# Patient Record
Sex: FEMALE | Race: WHITE | Employment: OTHER | RURAL
[De-identification: names, ages, dates, MRNs, and addresses within clinical notes are randomized per-mention and may not be internally consistent; named-entity substitution may affect disease eponyms.]

---

## 2020-04-29 ENCOUNTER — TELEPHONE (OUTPATIENT)
Dept: CARDIOLOGY CLINIC | Age: 60
End: 2020-04-29

## 2020-04-29 PROBLEM — D64.9 ACUTE ON CHRONIC ANEMIA: Status: ACTIVE | Noted: 2020-04-29

## 2020-04-29 NOTE — TELEPHONE ENCOUNTER
Spoke w/ BELGICA Jin regarding this pts upcoming appt with Dr. Zion Olson on Friday (VV). Pts sx's are worsening since VV with Zackary Malik NP 4/28/20. Pt went  to [facility] today to have labs drawn. Pt had a presyncopal episode outside of the office and was assisted to the sitting position by BELGICA Jin. Annabel's note from today:  \"2423  Patient came to office to have her labs drawn per GABRIELLE Ventura's orders. Patient came to the door with her son and stated that she felt like she needed to sit down. Upon visualizing the patient, patient began to look as she was going to have a syncopal episode and pallor noted. Patient placed in sitting position on seat outside of door. Patient began to regain color and felt better once seated. Patient still fatigued and weak. Patient afebrile. Denies any other symptoms at this time. Notified EFRAÍN Augustine NP in office of situation and due to assessment of patient and situation, patient was then changed to an in-person visit with Inessa Rees in order to complete vitals and ekg and further assessment. Patient placed in room with Nurse Selena Moreira RN. Patient stable at this time. Tried to also contact Dr. Zion Olson office. Sent message to RIGOBERTO Medina LPN with Dr. Zion Olosn to return call to give update on patient as she is to be seen by him on Friday with a virtual visit. Manisha Knight LPN  From Dr. Zion Olson' office and she is aware of situation and vitals and EKG and states that she will discuss with Dr. Zion Olson tomorrow to confirm if he wants to still see her with VV or see in office. They will contact patient of any change. \"    EKG and labs have been drawn. Orthos were performed and documented by Carol Robert RN. \"Orthostatic BP readings sitting 120/68  p 62  flat 110/62 p 68and standing 110/60 p 60.\"    I will discuss with Dr. Zion Olson tomorrow.

## 2020-04-30 NOTE — TELEPHONE ENCOUNTER
Appt for 5/1 (cardiology) has been cancelled per Dr. Akanksha Taveras and James Lomeli, NP. Pt will be called by [facility].

## 2020-05-01 ENCOUNTER — PATIENT OUTREACH (OUTPATIENT)
Dept: FAMILY MEDICINE CLINIC | Age: 60
End: 2020-05-01

## 2020-05-01 NOTE — PROGRESS NOTES
Patient contacted regarding recent discharge and COVID-19 risk   Care Transition Nurse/ Ambulatory Care Manager contacted the patient by telephone to perform post discharge assessment. Verified name and  with patient as identifiers. Patient has following risk factors of:Chronic anemia . CTN/ACM reviewed discharge instructions, medical action plan and red flags related to discharge diagnosis. Reviewed and educated them on any new and changed medications related to discharge diagnosis. Advised obtaining a 90-day supply of all daily and as-needed medications. Education provided regarding infection prevention, and signs and symptoms of COVID-19 and when to seek medical attention with patient who verbalized understanding. Discussed exposure protocols and quarantine from [address] you at higher risk for severe illness  and given an opportunity for questions and concerns. The patient agrees to contact the COVID-19 hotline [phone] or PCP office for questions related to their healthcare. CTN/ACM provided contact information for future reference. From CDC: Are you at higher risk for severe illness?  Wash your hands often.  Avoid close contact (6 feet, which is about two arm lengths) with people who are sick.  Put distance between yourself and other people if COVID-19 is spreading in your community.  Clean and disinfect frequently touched surfaces.  Avoid all cruise travel and non-essential air travel.  Call your healthcare professional if you have concerns about COVID-19 and your underlying condition or if you are sick. For more information on steps you can take to protect yourself, see CDC's How to Protect Yourself      Patient/family/caregiver given information for [employer] and agrees to enroll yes  Patient's preferred e-mail:  [email or web address]  Patient's preferred phone number: [phone]  Based on Loop alert triggers, patient will be contacted by nurse care manager for worsening symptoms. Pt will be further monitored by COVID Loop Team based on severity of symptoms and risk factors.     PCP follow up completed 5/1/2020

## 2020-05-14 ENCOUNTER — OFFICE VISIT (OUTPATIENT)
Dept: ONCOLOGY | Age: 60
End: 2020-05-14

## 2020-05-14 VITALS
DIASTOLIC BLOOD PRESSURE: 82 MMHG | SYSTOLIC BLOOD PRESSURE: 131 MMHG | RESPIRATION RATE: 18 BRPM | WEIGHT: 167.8 LBS | OXYGEN SATURATION: 99 % | HEIGHT: 66 IN | BODY MASS INDEX: 26.97 KG/M2 | TEMPERATURE: 98.1 F | HEART RATE: 88 BPM

## 2020-05-14 DIAGNOSIS — D50.9 IRON DEFICIENCY ANEMIA, UNSPECIFIED IRON DEFICIENCY ANEMIA TYPE: Primary | ICD-10-CM

## 2020-05-14 RX ORDER — DIPHENHYDRAMINE HYDROCHLORIDE 50 MG/ML
25 INJECTION, SOLUTION INTRAMUSCULAR; INTRAVENOUS AS NEEDED
Status: CANCELLED
Start: 2020-05-22

## 2020-05-14 RX ORDER — DIPHENHYDRAMINE HYDROCHLORIDE 50 MG/ML
50 INJECTION, SOLUTION INTRAMUSCULAR; INTRAVENOUS AS NEEDED
Status: CANCELLED
Start: 2020-05-22

## 2020-05-14 RX ORDER — HEPARIN 100 UNIT/ML
300-500 SYRINGE INTRAVENOUS AS NEEDED
Status: CANCELLED
Start: 2020-05-22

## 2020-05-14 RX ORDER — DIPHENHYDRAMINE HYDROCHLORIDE 50 MG/ML
50 INJECTION, SOLUTION INTRAMUSCULAR; INTRAVENOUS AS NEEDED
Status: CANCELLED
Start: 2020-05-15

## 2020-05-14 RX ORDER — ALBUTEROL SULFATE 0.83 MG/ML
2.5 SOLUTION RESPIRATORY (INHALATION) AS NEEDED
Status: CANCELLED
Start: 2020-05-22

## 2020-05-14 RX ORDER — EPINEPHRINE 1 MG/ML
0.3 INJECTION, SOLUTION, CONCENTRATE INTRAVENOUS AS NEEDED
Status: CANCELLED | OUTPATIENT
Start: 2020-05-22

## 2020-05-14 RX ORDER — ALBUTEROL SULFATE 0.83 MG/ML
2.5 SOLUTION RESPIRATORY (INHALATION) AS NEEDED
Status: CANCELLED
Start: 2020-05-15

## 2020-05-14 RX ORDER — HEPARIN 100 UNIT/ML
300-500 SYRINGE INTRAVENOUS AS NEEDED
Status: CANCELLED
Start: 2020-05-15

## 2020-05-14 RX ORDER — SODIUM CHLORIDE 0.9 % (FLUSH) 0.9 %
10 SYRINGE (ML) INJECTION AS NEEDED
Status: CANCELLED
Start: 2020-05-22

## 2020-05-14 RX ORDER — ONDANSETRON 2 MG/ML
8 INJECTION INTRAMUSCULAR; INTRAVENOUS AS NEEDED
Status: CANCELLED | OUTPATIENT
Start: 2020-05-15

## 2020-05-14 RX ORDER — HYDROCORTISONE SODIUM SUCCINATE 100 MG/2ML
100 INJECTION, POWDER, FOR SOLUTION INTRAMUSCULAR; INTRAVENOUS AS NEEDED
Status: CANCELLED | OUTPATIENT
Start: 2020-05-22

## 2020-05-14 RX ORDER — SODIUM CHLORIDE 9 MG/ML
10 INJECTION INTRAMUSCULAR; INTRAVENOUS; SUBCUTANEOUS AS NEEDED
Status: CANCELLED | OUTPATIENT
Start: 2020-05-22

## 2020-05-14 RX ORDER — ACETAMINOPHEN 325 MG/1
650 TABLET ORAL AS NEEDED
Status: CANCELLED
Start: 2020-05-22

## 2020-05-14 RX ORDER — ONDANSETRON 2 MG/ML
8 INJECTION INTRAMUSCULAR; INTRAVENOUS AS NEEDED
Status: CANCELLED | OUTPATIENT
Start: 2020-05-22

## 2020-05-14 RX ORDER — EPINEPHRINE 1 MG/ML
0.3 INJECTION, SOLUTION, CONCENTRATE INTRAVENOUS AS NEEDED
Status: CANCELLED | OUTPATIENT
Start: 2020-05-15

## 2020-05-14 RX ORDER — HYDROCORTISONE SODIUM SUCCINATE 100 MG/2ML
100 INJECTION, POWDER, FOR SOLUTION INTRAMUSCULAR; INTRAVENOUS AS NEEDED
Status: CANCELLED | OUTPATIENT
Start: 2020-05-15

## 2020-05-14 RX ORDER — SODIUM CHLORIDE 9 MG/ML
10 INJECTION INTRAMUSCULAR; INTRAVENOUS; SUBCUTANEOUS AS NEEDED
Status: CANCELLED | OUTPATIENT
Start: 2020-05-15

## 2020-05-14 RX ORDER — SODIUM CHLORIDE 0.9 % (FLUSH) 0.9 %
10 SYRINGE (ML) INJECTION AS NEEDED
Status: CANCELLED
Start: 2020-05-15

## 2020-05-14 RX ORDER — ACETAMINOPHEN 325 MG/1
650 TABLET ORAL AS NEEDED
Status: CANCELLED
Start: 2020-05-15

## 2020-05-14 RX ORDER — DEXTROMETHORPHAN HYDROBROMIDE, GUAIFENESIN 5; 100 MG/5ML; MG/5ML
650 LIQUID ORAL
COMMUNITY
End: 2021-12-05

## 2020-05-14 RX ORDER — DIPHENHYDRAMINE HYDROCHLORIDE 50 MG/ML
25 INJECTION, SOLUTION INTRAMUSCULAR; INTRAVENOUS AS NEEDED
Status: CANCELLED
Start: 2020-05-15

## 2020-05-14 RX ORDER — ASCORBIC ACID 500 MG
1000 TABLET ORAL DAILY
COMMUNITY

## 2020-05-14 NOTE — PROGRESS NOTES
Merissa Buckley is a 62 y.o. female new patient today referred by GABRIELLE Ventura for Low hemoglobin and anemia. Patient first saw a Hematologist in 2017 for anemia. Patient c/o feeling tired, having SOB with exertions.      Patient received PRBC transfusion   Injectafer 5/8/2020 x 1, scheduled for #2 on 5/15/2020  1 unit PRBC 5/5/2020  2 units PRBC on 4/29/2020    Last;  Colonoscopy about 11 years ago

## 2020-05-14 NOTE — PROGRESS NOTES
Reason for Visit:   Jose Cornell is a 62 y.o. female who is seen for iron deficiency anemia    Treatment History:   Dx: Iron Deficiency anemia, transfused PRBCs 5/5/2020 (1 unit) and 5/9/2020 (2 units)  Tx: Injectofer    History of Present Illness:   Seen for above--originally dx a few years ago in [place]--no source found. Was treated with transfusion and parenteral iron. Moved to this area and noted lightheadedness and fatigue--check found microcytic anemia with very low iron. Has been transfused total 3Units PRBC's in the past 3 weeks. Has also received one dose of Injectofer. Fatigue is better but still present. No obvious signs of bleeding and is hemoccult negative. Craving ice. Past Medical History:   Diagnosis Date    Allergies     Anemia       No past surgical history on file. Social History     Tobacco Use    Smoking status: Never Smoker    Smokeless tobacco: Never Used   Substance Use Topics    Alcohol use: Yes     Alcohol/week: 1.0 standard drinks     Types: 1 Shots of liquor per week     Comment: ususally 1 tequile shot per month      Family History   Problem Relation Age of Onset    Alcohol abuse Father     Dementia Father     Alcohol abuse Brother     Heart Disease Mother         open heart surgery with valve replacement    Alcohol abuse Sister     Cancer Maternal Aunt         unknown    Cancer Maternal Uncle 79        prostate    Arthritis-rheumatoid Paternal Aunt     Cancer Paternal Uncle 79        prostate ?  Alcohol abuse Paternal Grandfather      Current Outpatient Medications   Medication Sig    acetaminophen (Tylenol Arthritis Pain) 650 mg TbER Take 650 mg by mouth every eight (8) hours as needed for Pain.  ascorbic acid, vitamin C, (Vitamin C) 500 mg tablet Take 1,000 mg by mouth daily.  ferric carboxymaltose (INJECTAFER IV) by IntraVENous route.  Indications: last infusion 5/8/2020    CYANOCOBALAMIN, VITAMIN B-12, 5,000 Units by Does Not Apply route daily.  lisdexamfetamine (Vyvanse) 50 mg cap Take 1 Cap by mouth daily. Max Daily Amount: 50 mg.    furosemide (Lasix) 20 mg tablet Take 20 mg by mouth daily as needed.  ferrous sulfate 325 mg (65 mg iron) tablet Take 1 Tab by mouth two (2) times a day.  Synthroid 75 mcg tablet Take 1 Tab by mouth Daily (before breakfast).  desvenlafaxine succinate (PRISTIQ) 50 mg ER tablet Take 50 mg by mouth. No current facility-administered medications for this visit. Allergies   Allergen Reactions    Macrobid [Nitrofurantoin Monohyd/M-Cryst] Palpitations    Weed Pollen-Short Ragweed Rash        Review of Systems: A complete review of systems was obtained, negative except as described above. Physical Exam:     Visit Vitals  /82   Pulse 88   Temp 98.1 °F (36.7 °C) (Oral)   Resp 18   Ht 5' 5.75\" (1.67 m)   Wt 167 lb 12.8 oz (76.1 kg)   SpO2 99%   BMI 27.29 kg/m²     ECOG PS:   General: No distress  Eyes: PERRLA, anicteric sclerae  HENT: Atraumatic, OP clear  Neck: Supple  Lymphatic: No cervical, supraclavicular, or inguinal adenopathy  Respiratory: CTAB, normal respiratory effort  CV: Normal rate, regular rhythm, no murmurs, no peripheral edema  GI: Soft, nontender, nondistended, no masses, no hepatomegaly, no splenomegaly  MS: Normal gait and station. Digits without clubbing or cyanosis. Skin: No rashes, ecchymoses, or petechiae. Normal temperature, turgor, and texture. Psych: Alert, oriented, appropriate affect, normal judgment/insight    Results:     Lab Results   Component Value Date/Time    WBC 5.2 05/05/2020 02:51 PM    HGB 7.4 (L) 05/05/2020 02:51 PM    HCT 26.8 (L) 05/05/2020 02:51 PM    PLATELET 049 92/29/5528 02:51 PM    MCV 75.7 (L) 05/05/2020 02:51 PM    ABS.  NEUTROPHILS 3.3 05/05/2020 02:51 PM    Hemoglobin (POC) 7.3 05/01/2020 01:25 PM     Lab Results   Component Value Date/Time    Sodium 137 04/29/2020 04:54 PM    Potassium 4.4 04/29/2020 04:54 PM    Chloride 102 04/29/2020 04:54 PM    CO2 26 04/29/2020 04:54 PM    Glucose 108 (H) 04/29/2020 04:54 PM    BUN 26 (H) 04/29/2020 04:54 PM    Creatinine 0.66 04/29/2020 04:54 PM    GFR est AA >60 04/29/2020 04:54 PM    GFR est non-AA >60 04/29/2020 04:54 PM    Calcium 9.5 04/29/2020 04:54 PM    Glucose  04/29/2020 03:01 PM     Lab Results   Component Value Date/Time    Bilirubin, total 0.2 04/29/2020 02:49 PM    ALT (SGPT) 13 04/29/2020 02:49 PM    AST (SGOT) 11 04/29/2020 02:49 PM    Alk. phosphatase 83 04/29/2020 02:49 PM    Protein, total 6.3 04/29/2020 02:49 PM    Albumin 4.6 04/29/2020 02:49 PM           Assessment:   1) Iron Deficiency Anemia        Plan:   1) Injectofer, referral for colonoscopy and possible upper. Back in one month.     Signed By: Kassie Jeffries MD

## 2020-05-28 ENCOUNTER — TELEPHONE (OUTPATIENT)
Dept: ONCOLOGY | Age: 60
End: 2020-05-28

## 2020-06-08 ENCOUNTER — OFFICE VISIT (OUTPATIENT)
Dept: SURGERY | Age: 60
End: 2020-06-08

## 2020-06-08 VITALS
SYSTOLIC BLOOD PRESSURE: 133 MMHG | BODY MASS INDEX: 25.88 KG/M2 | OXYGEN SATURATION: 100 % | TEMPERATURE: 95 F | RESPIRATION RATE: 14 BRPM | DIASTOLIC BLOOD PRESSURE: 85 MMHG | HEART RATE: 84 BPM | HEIGHT: 66 IN | WEIGHT: 161 LBS

## 2020-06-08 DIAGNOSIS — D64.9 ANEMIA, UNSPECIFIED TYPE: Primary | ICD-10-CM

## 2020-06-08 RX ORDER — OMEPRAZOLE 40 MG/1
40 CAPSULE, DELAYED RELEASE ORAL DAILY
Qty: 30 CAP | Refills: 2 | Status: SHIPPED | OUTPATIENT
Start: 2020-06-08 | End: 2020-08-31 | Stop reason: SDUPTHER

## 2020-06-08 NOTE — PROGRESS NOTES
1. Have you been to the ER, urgent care clinic since your last visit? Hospitalized since your last visit? No    2. Have you seen or consulted any other health care providers outside of the [address] [place] [address] since your last visit? Include any pap smears or colon screening.  No

## 2020-06-08 NOTE — PROGRESS NOTES
Rodriguez Weinstein is a 62 y.o. female who presents today with the following:  Chief Complaint   Patient presents with    Anemia       HPI    78-year-old female who presents to us as a referral by Dr. Mansoor Cerna for possible EGD and colonoscopy. The patient apparently had significant anemia and was initially evaluated by Rupa Erwin. When she was found to be severely anemic she was referred to the emergency department where she received a 2 unit transfusion. According to the patient she was encouraged to be admitted but she did not want to be admitted to the hospital because of the risk of COVID-19. This was in early May. She subsequently had an additional 1 unit transfusion later that month. She also received 2 iron infusions that were done in [place]. She denies having any abdominal pain. She denied any melena or hematochezia. She denied any nausea or vomiting. She states 5 years ago she was found to be anemic and had 2 iron infusions and her anemia resolved and did not have any endoscopic work-up at that point. She states that every year on her annual exams she has a stool test for blood and they have always been negative. She also was checked for a Hemoccult test when she was in the emergency department at the time of her 2 unit transfusion and apparently was heme-negative at that point as well. Her last colonoscopy was 11 years ago and she believes it was normal.  She states in the past she has had significant problems with reflux and dysphasia for which she has put herself on over-the-counter Prevacid but she has not done this again since August of last year. She states she was under a significant amount of stress through the winter she started to feel severely fatigued by January. She was getting short of breath and lightheaded and that is when she was evaluated in May by Rupa Erwin.   Since her iron infusions and blood transfusions her most recent hemoglobin has normalized to 11.7 and she is no longer microcytic. Interestingly enough back in November she was having some back pain and she admits to taking at least 4 Advil tablets at a time every 6 hours and this seems like it was going on for more than a week. She was not taking any GI protection at that point. She does not smoke and rarely drinks alcohol. Past Medical History:   Diagnosis Date    Allergies     Anemia        History reviewed. No pertinent surgical history. Social History     Socioeconomic History    Marital status: LEGALLY      Spouse name: Not on file    Number of children: 2    Years of education: 12    Highest education level: Not on file   Occupational History    Not on file   Social Needs    Financial resource strain: Not hard at all   Orbital Traction insecurity     Worry: Never true     Inability: Never true   ViFlux needs     Medical: No     Non-medical: No   Tobacco Use    Smoking status: Never Smoker    Smokeless tobacco: Never Used   Substance and Sexual Activity    Alcohol use:  Yes     Alcohol/week: 1.0 standard drinks     Types: 1 Shots of liquor per week     Comment: ususally 1 tequile shot per month    Drug use: Never    Sexual activity: Not on file   Lifestyle    Physical activity     Days per week: 0 days     Minutes per session: 0 min    Stress: Not at all   Relationships    Social connections     Talks on phone: More than three times a week     Gets together: More than three times a week     Attends [employer] service: Never     Active member of club or organization: No     Attends meetings of clubs or organizations: Not on file     Relationship status:     Intimate partner violence     Fear of current or ex partner: No     Emotionally abused: No     Physically abused: No     Forced sexual activity: No   Other Topics Concern     Service No    Blood Transfusions No    Caffeine Concern No    Occupational Exposure No    Hobby Hazards No    Sleep Concern No    Stress Concern No    Weight Concern No    Special Diet No    Back Care No    Exercise No    Bike Helmet No    Seat Belt Yes    Self-Exams Yes   Social History Narrative    Not on file       Family History   Problem Relation Age of Onset    Alcohol abuse Father     Dementia Father     Alcohol abuse Brother     Heart Disease Mother         open heart surgery with valve replacement    Alcohol abuse Sister     Cancer Maternal Aunt         unknown    Cancer Maternal Uncle 79        prostate    Arthritis-rheumatoid Paternal Aunt     Cancer Paternal Uncle 79        prostate ?  Alcohol abuse Paternal Grandfather        Allergies   Allergen Reactions    Macrobid [Nitrofurantoin Monohyd/M-Cryst] Palpitations    Weed Pollen-Short Ragweed Rash       Current Outpatient Medications   Medication Sig    omeprazole (PRILOSEC) 40 mg capsule Take 1 Cap by mouth daily for 90 days.  doxycycline (MONODOX) 100 mg capsule Take 1 Cap by mouth two (2) times a day for 10 days.  desvenlafaxine succinate (Pristiq) 25 mg ER tablet Take 1 Tab by mouth daily.  fluvoxaMINE (LUVOX) 50 mg tablet Take 1 Tab by mouth nightly.  acetaminophen (Tylenol Arthritis Pain) 650 mg TbER Take 650 mg by mouth every eight (8) hours as needed for Pain.  ascorbic acid, vitamin C, (Vitamin C) 500 mg tablet Take 1,000 mg by mouth daily.  ferric carboxymaltose (INJECTAFER IV) by IntraVENous route. Not current medication  Indications: last infusion 5/8/2020    CYANOCOBALAMIN, VITAMIN B-12, 5,000 Units by Does Not Apply route daily.  lisdexamfetamine (Vyvanse) 50 mg cap Take 1 Cap by mouth daily. Max Daily Amount: 50 mg.    furosemide (Lasix) 20 mg tablet Take 20 mg by mouth daily as needed. Not a current medication    ferrous sulfate 325 mg (65 mg iron) tablet Take 1 Tab by mouth two (2) times a day.  Synthroid 75 mcg tablet Take 1 Tab by mouth Daily (before breakfast).      No current facility-administered medications for this visit. The above histories, medications and allergies have been reviewed. Review of Systems   Constitutional: Positive for malaise/fatigue. HENT: Positive for congestion. Musculoskeletal: Positive for back pain, myalgias and neck pain. Psychiatric/Behavioral: Positive for depression. Visit Vitals  /85 (BP 1 Location: Left arm, BP Patient Position: Sitting)   Pulse 84   Temp (!) 95 °F (35 °C) (Temporal)   Resp 14   Ht 5' 5.75\" (1.67 m)   Wt 161 lb (73 kg)   SpO2 100%   BMI 26.18 kg/m²     Physical Exam  Constitutional:       Appearance: Normal appearance. Cardiovascular:      Rate and Rhythm: Normal rate and regular rhythm. Pulmonary:      Effort: Pulmonary effort is normal.      Breath sounds: Normal breath sounds. Abdominal:      General: There is no distension. Palpations: Abdomen is soft. There is no mass. Tenderness: There is no abdominal tenderness. Neurological:      Mental Status: She is alert. 1. Anemia, unspecified type  I have recommended EGD and colonoscopy for this patient. In particular with her history of NSAID use back in November and the subsequent anemia I would not be surprised if she had an ulcer. It is also been at least 10 to 11 years since her last colonoscopy. She is very concerned about the possibility of priscila COVID-19. She does not desire any further procedures at the hospital.  We had an extensive discussion stating that I could not tell her what the potential cause was without further investigation. I have explained that there is also the possibility that this may be more than peptic ulcer disease and there is a possibility of a cancer. She understands and still wishes to hold off on endoscopies at this point stating that she would reconsider. Based on this I will go ahead and treat her with omeprazole daily for the next 6 weeks. If she changes her mind we can plan on proceeding with endoscopies.   I am cautiously optimistic seeing that her hemoglobin has normalized as has her MCV however I would feel better if we had done endoscopy so that we could make sure there was nothing more ominous present. Follow-up and Dispositions    · Return if symptoms worsen or fail to improve.          Lana Duran MD

## 2020-06-08 NOTE — PATIENT INSTRUCTIONS
Anemia: Care Instructions Your Care Instructions Anemia is a low level of red blood cells, which carry oxygen throughout your body. Many things can cause anemia. Lack of iron is one of the most common causes. Your body needs iron to make hemoglobin, a substance in red blood cells that carries oxygen from the lungs to your body's cells. Without enough iron, the body produces fewer and smaller red blood cells. As a result, your body's cells do not get enough oxygen, and you feel tired and weak. And you may have trouble concentrating. Bleeding is the most common cause of a lack of iron. You may have heavy menstrual bleeding or bleeding caused by conditions such as ulcers, hemorrhoids, or cancer. Regular use of aspirin or other anti-inflammatory medicines (such as ibuprofen) also can cause bleeding in some people. A lack of iron in your diet also can cause anemia, especially at times when the body needs more iron, such as during pregnancy, infancy, and the teen years. Your doctor may have prescribed iron pills. It may take several months of treatment for your iron levels to return to normal. Your doctor also may suggest that you eat foods that are rich in iron, such as meat and beans. There are many other causes of anemia. It is not always due to a lack of iron. Finding the specific cause of your anemia will help your doctor find the right treatment for you. Follow-up care is a key part of your treatment and safety. Be sure to make and go to all appointments, and call your doctor if you are having problems. It's also a good idea to know your test results and keep a list of the medicines you take. How can you care for yourself at home? · Take your medicines exactly as prescribed. Call your doctor if you think you are having a problem with your medicine. · If your doctor recommends iron pills, take them as directed: ? Try to take the pills on an empty stomach about 1 hour before or 2 hours after meals. But you may need to take iron with food to avoid an upset stomach. ? Do not take antacids or drink milk or caffeine drinks (such as coffee, tea, or cola) at the same time or within 2 hours of the time that you take your iron. They can make it hard for your body to absorb the iron. ? Vitamin C (from food or supplements) helps your body absorb iron. Try taking iron pills with a glass of orange juice or some other food that is high in vitamin C, such as citrus fruits. ? Iron pills may cause stomach problems, such as heartburn, nausea, diarrhea, constipation, and cramps. Be sure to drink plenty of fluids, and include fruits, vegetables, and fiber in your diet each day. Iron pills often make your bowel movements dark or green. ? If you forget to take an iron pill, do not take a double dose of iron the next time you take a pill. ? Keep iron pills out of the reach of small children. An overdose of iron can be very dangerous. · Follow your doctor's advice about eating iron-rich foods. These include red meat, shellfish, poultry, eggs, beans, raisins, whole-grain bread, and leafy green vegetables. · Steam vegetables to help them keep their iron content. When should you call for help? [id number] anytime you think you may need emergency care. For example, call if: 
· You have symptoms of a heart attack. These may include: 
? Chest pain or pressure, or a strange feeling in the chest. 
? Sweating. ? Shortness of breath. ? Nausea or vomiting. ? Pain, pressure, or a strange feeling in the back, neck, jaw, or upper belly or in one or both shoulders or arms. ? Lightheadedness or sudden weakness. ? A fast or irregular heartbeat. After you call 911, the  may tell you to chew 1 adult-strength or 2 to 4 low-dose aspirin. Wait for an ambulance. Do not try to drive yourself. · You passed out (lost consciousness). Call your doctor now or seek immediate medical care if: · You have new or increased shortness of breath. · You are dizzy or lightheaded, or you feel like you may faint. · Your fatigue and weakness continue or get worse. · You have any abnormal bleeding, such as: 
? Nosebleeds. ? Vaginal bleeding that is different (heavier, more frequent, at a different time of the month) than what you are used to. 
? Bloody or black stools, or rectal bleeding. ? Bloody or pink urine. Watch closely for changes in your health, and be sure to contact your doctor if: 
· You do not get better as expected. Where can you learn more? Go to http://nishi-jose.info/ Enter R301 in the search box to learn more about \"Anemia: Care Instructions. \" Current as of: November 8, 2019               Content Version: 12.5 © 0763-8969 [employer]. Care instructions adapted under license by [employer] (which disclaims liability or warranty for this information). If you have questions about a medical condition or this instruction, always ask your healthcare professional. [address] any warranty or liability for your use of this information.

## 2020-07-10 ENCOUNTER — TELEPHONE (OUTPATIENT)
Dept: ONCOLOGY | Age: 60
End: 2020-07-10

## 2020-07-10 NOTE — TELEPHONE ENCOUNTER
Patient called & canceled apt scheduled for today.  Patient has a stomach virus, apt r/s  on 07/16/2020

## 2020-07-20 ENCOUNTER — OFFICE VISIT (OUTPATIENT)
Dept: SURGERY | Age: 60
End: 2020-07-20

## 2020-07-20 VITALS
HEIGHT: 66 IN | BODY MASS INDEX: 25.88 KG/M2 | WEIGHT: 161 LBS | DIASTOLIC BLOOD PRESSURE: 91 MMHG | TEMPERATURE: 97.3 F | SYSTOLIC BLOOD PRESSURE: 141 MMHG | HEART RATE: 89 BPM

## 2020-07-20 DIAGNOSIS — D64.9 ANEMIA, UNSPECIFIED TYPE: Primary | ICD-10-CM

## 2020-07-20 NOTE — PATIENT INSTRUCTIONS
Learning About Colonoscopy  What is a colonoscopy? A colonoscopy is a test (also called a procedure) that lets a doctor look inside your large intestine. The doctor uses a thin, lighted tube called a colonoscope. The doctor uses it to look for small growths called polyps, colon or rectal cancer (colorectal cancer), or other problems like bleeding. During the procedure, the doctor can take samples of tissue. The samples can then be checked for cancer or other conditions. The doctor can also take out polyps. How is a colonoscopy done? This procedure is done in a doctor's office or a clinic or hospital. You will get medicine to help you relax and not feel pain. Some people find that they don't remember having the test because of the medicine. The doctor gently moves the colonoscope, or scope, through the colon. The scope is also a small video camera. It lets the doctor see the colon and take pictures. How do you prepare for the procedure? You need to clean out your colon before the procedure so the doctor can see all of your colon. This process may start a day or two before the test. This depends on which \"colon prep\" your doctor recommends. To clean your colon, you stop eating solid foods and drink only clear liquids. You can have water, tea, coffee, clear juices, clear broths, flavored ice pops, and gelatin (such as Jell-O). Do not drink anything red or purple. The day or night before the procedure, you drink a large amount of a special liquid. This causes loose, frequent stools. You will go to the bathroom a lot. It's very important to drink all of the liquid. If you have problems drinking it, call your doctor. Some people don't go to work or do their usual activities on the day of the prep. Arrange to have someone take you home after the test.  What can you expect after a colonoscopy? Your doctor will tell you when you can eat and do your usual activities.   Drink a lot of fluid after the test to replace the fluids you may have lost during the colon prep. But don't drink alcohol. Your doctor will talk to you about when you'll need your next colonoscopy. The results of your test and your risk for colorectal cancer will help your doctor decide how often you need to be checked. After the test, you may be bloated or have gas pains. You may need to pass gas. If a biopsy was done or a polyp was removed, you may have streaks of blood in your stool (feces) for a few days. If polyps were taken out, your doctor may tell you to avoid taking aspirin and nonsteroidal anti-inflammatory drugs (NSAIDs) for 7 to 14 days. Problems such as heavy rectal bleeding may not occur until several weeks after the test. This isn't common. But it can happen after polyps are removed. Follow-up care is a key part of your treatment and safety. Be sure to make and go to all appointments, and call your doctor if you are having problems. It's also a good idea to know your test results and keep a list of the medicines you take. Where can you learn more? Go to http://nishi-jose.info/  Enter Z368 in the search box to learn more about \"Learning About Colonoscopy. \"  Current as of: August 22, 2019               Content Version: 12.5  © 7726-6812 [employer]. Care instructions adapted under license by [employer] (which disclaims liability or warranty for this information). If you have questions about a medical condition or this instruction, always ask your healthcare professional. [place] [address] any warranty or liability for your use of this information. Upper GI Endoscopy: Before Your Procedure  What is an upper GI endoscopy? An upper gastrointestinal (or GI) endoscopy is a test that allows your doctor to look at the inside of your esophagus, stomach, and the first part of your small intestine, called the duodenum.  The esophagus is the tube that carries food to your stomach. The doctor uses a thin, lighted tube that bends. It is called an endoscope, or scope. The doctor puts the tip of the scope in your mouth and gently moves it down your throat. The scope is a flexible video camera. The doctor looks at a monitor (like a TV set or a computer screen) as he or she moves the scope. A doctor may do this procedure to look for ulcers, tumors, infection, or bleeding. It also can be used to look for signs of acid backing up into your esophagus. This is called gastroesophageal reflux disease, or GERD. The doctor can use the scope to take a sample of tissue for study (a biopsy). The doctor also can use the scope to take out growths or stop bleeding. Follow-up care is a key part of your treatment and safety. Be sure to make and go to all appointments, and call your doctor if you are having problems. It's also a good idea to know your test results and keep a list of the medicines you take. How do you prepare for the procedure? Procedures can be stressful. This information will help you understand what you can expect. And it will help you safely prepare for your procedure. Preparing for the procedure  · Do not eat or drink anything for 6 to 8 hours before the test. An empty stomach helps your doctor see your stomach clearly during the test. It also reduces your chances of vomiting. If you vomit, there is a small risk that the vomit could enter your lungs. (This is called aspiration.) If the test is done in an emergency, a tube may be inserted through your nose or mouth to empty your stomach. · Do not take sucralfate (Carafate) or antacids on the day of the test. These medicines can make it hard for your doctor to see your upper GI tract. · If your doctor tells you to, stop taking iron supplements 7 to 14 days before the test.  · Be sure you have someone to take you home. Anesthesia and pain medicine will make it unsafe for you to drive or get home on your own.   · Understand exactly what procedure is planned, along with the risks, benefits, and other options. · Tell your doctor ALL the medicines, vitamins, supplements, and herbal remedies you take. Some may increase the risk of problems during your procedure. Your doctor will tell you if you should stop taking any of them before the procedure and how soon to do it. · If you take aspirin or some other blood thinner, ask your doctor if you should stop taking it before your procedure. Make sure that you understand exactly what your doctor wants you to do. These medicines increase the risk of bleeding. · Make sure your doctor and the hospital have a copy of your advance directive. If you don't have one, you may want to prepare one. It lets others know your health care wishes. It's a good thing to have before any type of surgery or procedure. What happens on the day of the procedure? · Follow the instructions exactly about when to stop eating and drinking. If you don't, your procedure may be canceled. If your doctor told you to take your medicines on the day of the procedure, take them with only a sip of water. · Take a bath or shower before you come in for your procedure. Do not apply lotions, perfumes, deodorants, or nail polish. · Take off all jewelry and piercings. And take out contact lenses, if you wear them. At the hospital or surgery center    · Bring a picture ID. · The test may take 15 to 30 minutes. · The doctor may spray medicine on the back of your throat to numb it. You also will get medicine to prevent pain and to relax you. · You will lie on your left side. The doctor will put the scope in your mouth and toward the back of your throat. The doctor will tell you when to swallow. This helps the scope move down your throat. You will be able to breathe normally. The doctor will move the scope down your esophagus into your stomach. The doctor also may look at the duodenum.   · If your doctor wants to take a sample of tissue for a biopsy, he or she may use small surgical tools, which are put into the scope, to cut off some tissue. You will not feel a biopsy, if one is taken. The doctor also can use the tools to stop bleeding or to do other treatments, if needed. · You will stay at the hospital or surgery center for 1 to 2 hours until the medicine you were given wears off. What happens after an upper GI endoscopy? · After the test, you may belch and feel bloated for a while. · You may have a tickling, dry throat or mouth. You may feel a bit hoarse, and you may have a mild sore throat. These symptoms may last several days. Throat lozenges and warm saltwater gargles can help relieve the throat symptoms. · Don't drive or operate machinery for 12 hours after the test.  · Your doctor will tell you when you can go back to your usual diet and activities. · Don't drink alcohol for 12 to 24 hours after the test.  When should you call your doctor? · You have questions or concerns. · You don't understand how to prepare for your procedure. · You become ill before the procedure (such as fever, flu, or a cold). · You need to reschedule or have changed your mind about having the procedure. Where can you learn more? Go to http://nishi-jose.info/  Enter P790 in the search box to learn more about \"Upper GI Endoscopy: Before Your Procedure. \"  Current as of: August 12, 2019               Content Version: 12.5  © 9561-7596 [employer]. Care instructions adapted under license by [employer] (which disclaims liability or warranty for this information). If you have questions about a medical condition or this instruction, always ask your healthcare professional. [place] [address] any warranty or liability for your use of this information.

## 2020-07-20 NOTE — PROGRESS NOTES
Clarence Lauren is a 62 y.o. female who presents today with the following:  Chief Complaint   Patient presents with    Anemia       HPI    55-year-old female who presents to us as a referral by Dr. Kalli Burdick for possible EGD and colonoscopy. The patient apparently had significant anemia and was initially evaluated by Ji Santizo. When she was found to be severely anemic she was referred to the emergency department where she received a 2 unit transfusion. According to the patient she was encouraged to be admitted but she did not want to be admitted to the hospital because of the risk of COVID-19. This was in early May. She subsequently had an additional 1 unit transfusion later that month. She also received 2 iron infusions that were done in [place]. She denies having any abdominal pain. She denied any melena or hematochezia. She denied any nausea or vomiting. She states 5 years ago she was found to be anemic and had 2 iron infusions and her anemia resolved and did not have any endoscopic work-up at that point.     She states that every year on her annual exams she has a stool test for blood and they have always been negative. She also was checked for a Hemoccult test when she was in the emergency department at the time of her 2 unit transfusion and apparently was heme-negative at that point as well. Her last colonoscopy was 11 years ago and she believes it was normal.  She states in the past she has had significant problems with reflux and dysphasia for which she has put herself on over-the-counter Prevacid but she has not done this again since August of last year. She states she was under a significant amount of stress through the winter she started to feel severely fatigued by January. She was getting short of breath and lightheaded and that is when she was evaluated in May by Ji Santizo.   Since her iron infusions and blood transfusions her most recent hemoglobin has normalized to 11.7 and she is no longer microcytic.     Interestingly enough back in November she was having some back pain and she admits to taking at least 4 Advil tablets at a time every 6 hours and this seems like it was going on for more than a week. She was not taking any GI protection at that point. She does not smoke and rarely drinks alcohol. We sent her out at that point to take a PPI for 6 weeks which she has been doing more or less although she has forgotten a few doses. Today she states that she is feeling much better and her energy level has normalized but she was considering proceeding now with EGD and colonoscopy. Past Medical History:   Diagnosis Date    Allergies     Anemia        History reviewed. No pertinent surgical history. Social History     Socioeconomic History    Marital status: LEGALLY      Spouse name: Not on file    Number of children: 2    Years of education: 12    Highest education level: Not on file   Occupational History    Not on file   Social Needs    Financial resource strain: Not hard at all   [employer] insecurity     Worry: Never true     Inability: Never true   [employer] needs     Medical: No     Non-medical: No   Tobacco Use    Smoking status: Never Smoker    Smokeless tobacco: Never Used   Substance and Sexual Activity    Alcohol use:  Yes     Alcohol/week: 1.0 standard drinks     Types: 1 Shots of liquor per week     Comment: ususally 1 tequile shot per month    Drug use: Never    Sexual activity: Not on file   Lifestyle    Physical activity     Days per week: 0 days     Minutes per session: 0 min    Stress: Not at all   Relationships    Social connections     Talks on phone: More than three times a week     Gets together: More than three times a week     Attends [employer] service: Never     Active member of club or organization: No     Attends meetings of clubs or organizations: Not on file     Relationship status:     Intimate partner violence     Fear of current or ex partner: No     Emotionally abused: No     Physically abused: No     Forced sexual activity: No   Other Topics Concern     Service No    Blood Transfusions No    Caffeine Concern No    Occupational Exposure No    Hobby Hazards No    Sleep Concern No    Stress Concern No    Weight Concern No    Special Diet No    Back Care No    Exercise No    Bike Helmet No    Seat Belt Yes    Self-Exams Yes   Social History Narrative    Not on file       Family History   Problem Relation Age of Onset    Alcohol abuse Father     Dementia Father     Alcohol abuse Brother     Heart Disease Mother         open heart surgery with valve replacement    Alcohol abuse Sister     Cancer Maternal Aunt         unknown    Cancer Maternal Uncle 79        prostate    Arthritis-rheumatoid Paternal Aunt     Cancer Paternal Uncle 79        prostate ?  Alcohol abuse Paternal Grandfather        Allergies   Allergen Reactions    Macrobid [Nitrofurantoin Monohyd/M-Cryst] Palpitations    Weed Pollen-Short Ragweed Rash       Current Outpatient Medications   Medication Sig    lisdexamfetamine (Vyvanse) 50 mg cap Take 1 Cap by mouth daily. Max Daily Amount: 50 mg.    omeprazole (PRILOSEC) 40 mg capsule Take 1 Cap by mouth daily for 90 days.  desvenlafaxine succinate (Pristiq) 25 mg ER tablet Take 1 Tab by mouth daily.  fluvoxaMINE (LUVOX) 50 mg tablet Take 1 Tab by mouth nightly.  acetaminophen (Tylenol Arthritis Pain) 650 mg TbER Take 650 mg by mouth every eight (8) hours as needed for Pain.  ascorbic acid, vitamin C, (Vitamin C) 500 mg tablet Take 1,000 mg by mouth daily.  ferric carboxymaltose (INJECTAFER IV) by IntraVENous route. Not current medication  Indications: last infusion 5/8/2020    CYANOCOBALAMIN, VITAMIN B-12, 5,000 Units by Does Not Apply route daily.  furosemide (Lasix) 20 mg tablet Take 20 mg by mouth daily as needed.  Not a current medication    ferrous sulfate 325 mg (65 mg iron) tablet Take 1 Tab by mouth two (2) times a day.  Synthroid 75 mcg tablet Take 1 Tab by mouth Daily (before breakfast). No current facility-administered medications for this visit. The above histories, medications and allergies have been reviewed. ROS    Visit Vitals  BP (!) 141/91 (BP 1 Location: Left arm, BP Patient Position: Sitting)   Pulse 89   Temp 97.3 °F (36.3 °C) (Temporal)   Ht 5' 5.75\" (1.67 m)   Wt 161 lb (73 kg)   BMI 26.18 kg/m²     Physical Exam  Constitutional:       Appearance: Normal appearance. Cardiovascular:      Pulses: Normal pulses. Pulmonary:      Effort: Pulmonary effort is normal.      Breath sounds: Normal breath sounds. Abdominal:      General: There is no distension. Palpations: Abdomen is soft. There is no mass. Tenderness: There is no abdominal tenderness. Neurological:      Mental Status: She is alert. 1. Anemia, unspecified type  She would like to have a CBC which I think is reasonable to see where her anemia is at. She plans on proceeding with the EGD and colonoscopy but is somewhat hesitant in light of COVID-19. She states she will finalize her decision after her hemoglobin level has been determined. - CBC W/O DIFF    Recommend colonoscopy. The procedure was explained in detail including the risks and benefits. Risks shared included risks of missed lesions, incomplete exam, colon injury or perforation. Risks associated with anesthesia were also discussed. The patient wishes to proceed and we will schedule. Recommend EGD. Risks of the procedure were shared with the patient including the risks of aspiration or esophageal or gastric perforation. All questions were answered to the patient's satisfaction. We will schedule. The patient was counseled at length about the risks of priscila Covid-19 during their perioperative period and any recovery window from their procedure.   The patient was made aware that priscila Covid-19  may worsen their prognosis for recovering from their procedure and lend to a higher morbidity and/or mortality risk. All material risks, benefits, and reasonable alternatives including postponing the procedure were discussed. The patient does  wish to proceed with the procedure at this time. Follow-up and Dispositions    · Return for post procedure.          Heber Rojas MD

## 2020-10-09 DIAGNOSIS — F90.9 ATTENTION DEFICIT HYPERACTIVITY DISORDER (ADHD), UNSPECIFIED ADHD TYPE: ICD-10-CM

## 2020-12-29 ENCOUNTER — DOCUMENTATION ONLY (OUTPATIENT)
Dept: FAMILY MEDICINE CLINIC | Age: 60
End: 2020-12-29

## 2020-12-29 NOTE — PROGRESS NOTES
Recvd approval for PA, Case: [id number], fazed PA Approval to [facility] at [phone]. Recvd confirmation of fax. KT    Recvd fax for PA, covermymeds from [facility].     Started PA.    KEY: [id number]      They requested to know if pt has tried other oral antifungals. Pt has documented trial of Terbinafine from OV dated 2/21/2020. OV sent to [facility]. Awaiting decision.  MILAGROS

## 2021-03-31 DIAGNOSIS — F90.9 ATTENTION DEFICIT HYPERACTIVITY DISORDER (ADHD), UNSPECIFIED ADHD TYPE: ICD-10-CM

## 2021-03-31 NOTE — TELEPHONE ENCOUNTER
Requested Prescriptions     Pending Prescriptions Disp Refills    lisdexamfetamine (Vyvanse) 50 mg cap 30 Cap 0     Sig: Take 1 Cap by mouth daily. Max Daily Amount: 50 mg.  Indications: attention deficit disorder with hyperactivity

## 2021-04-15 ENCOUNTER — TELEPHONE (OUTPATIENT)
Dept: FAMILY MEDICINE CLINIC | Age: 61
End: 2021-04-15

## 2021-04-15 NOTE — TELEPHONE ENCOUNTER
----- Message from Ashley Guerin sent at 4/15/2021  1:37 PM EDT -----  Regarding: Nurse Ventura/Telephone    General Message/Vendor Calls    Caller's first and last name: Self      Reason for call: Pt would like to know if she can have antibodies test done. Callback required yes/no and why: Yes to advise if this test can be done. Best contact number(s): [phone]      Details to clarify the request:  Dr. Lucy Woods would like patient to have blood test for pneumonia antibodies before she gets second dose. Pt does not have an order for this testing.        Ashley Guerin

## 2021-04-15 NOTE — TELEPHONE ENCOUNTER
I have no idea what pneumonia antibodies she is referring to. She needs to get her other physician to send us an order of what exactly he wants.

## 2021-04-16 ENCOUNTER — TELEPHONE (OUTPATIENT)
Dept: FAMILY MEDICINE CLINIC | Age: 61
End: 2021-04-16

## 2021-04-16 NOTE — TELEPHONE ENCOUNTER
Patient is pleading that Stacy Harding complete the [employer] form for her medication. She says she can't focus without it and has little to no insurance to pay for medication.

## 2021-04-16 NOTE — TELEPHONE ENCOUNTER
Yes advised her that she should have pills remaining she only take 1 a day and was given 30 tablets. She stated that she has been traveling a lot from place to place and may have pills at home she will look, I have advised her that she needs to call pharmacy if she does not have remaining pills. Advised her I would complete form as much as I could but [employer] will need to sign off on it when she returns next week .

## 2021-04-28 ENCOUNTER — TELEPHONE (OUTPATIENT)
Dept: FAMILY MEDICINE CLINIC | Age: 61
End: 2021-04-28

## 2021-04-28 ENCOUNTER — OFFICE VISIT (OUTPATIENT)
Dept: FAMILY MEDICINE CLINIC | Age: 61
End: 2021-04-28
Payer: MEDICAID

## 2021-04-28 VITALS
RESPIRATION RATE: 16 BRPM | SYSTOLIC BLOOD PRESSURE: 138 MMHG | BODY MASS INDEX: 27.4 KG/M2 | OXYGEN SATURATION: 97 % | DIASTOLIC BLOOD PRESSURE: 82 MMHG | WEIGHT: 174.6 LBS | HEART RATE: 90 BPM | HEIGHT: 67 IN | TEMPERATURE: 97.8 F

## 2021-04-28 DIAGNOSIS — R63.5 WEIGHT GAIN: ICD-10-CM

## 2021-04-28 DIAGNOSIS — F33.2 MDD (MAJOR DEPRESSIVE DISORDER), RECURRENT SEVERE, WITHOUT PSYCHOSIS (HCC): ICD-10-CM

## 2021-04-28 DIAGNOSIS — Z23 ENCOUNTER FOR IMMUNIZATION: Primary | ICD-10-CM

## 2021-04-28 DIAGNOSIS — B35.0 FUNGAL SCALP INFECTION: ICD-10-CM

## 2021-04-28 DIAGNOSIS — R22.9 SKIN NODULE: ICD-10-CM

## 2021-04-28 PROCEDURE — 99213 OFFICE O/P EST LOW 20 MIN: CPT | Performed by: INTERNAL MEDICINE

## 2021-04-28 RX ORDER — SULFAMETHOXAZOLE AND TRIMETHOPRIM 800; 160 MG/1; MG/1
1 TABLET ORAL 2 TIMES DAILY
Qty: 14 TAB | Refills: 0 | Status: SHIPPED | OUTPATIENT
Start: 2021-04-28 | End: 2021-05-05

## 2021-04-28 RX ORDER — MUPIROCIN 20 MG/G
OINTMENT TOPICAL DAILY
Qty: 22 G | Refills: 0 | Status: SHIPPED | OUTPATIENT
Start: 2021-04-28 | End: 2021-06-07 | Stop reason: ALTCHOICE

## 2021-04-28 NOTE — PROGRESS NOTES
Pt presents to clinic today for removal of a tick. She reports that she was sitting on the ground yesterday and noticed it last night.

## 2021-04-28 NOTE — TELEPHONE ENCOUNTER
----- Message from Alissa Sierra sent at 4/28/2021 12:36 PM EDT -----  Regarding: Dr. Kristen Roach  Level 1/Escalated Issue      Caller's first and last name and relationship (if not the patient):      Best contact number(s): [phone]      What are the symptoms: pt has a tick embedded in her private area      Transfer successful - yes/no (include outcome): no, answer      Transfer declined - yes/no (include reason):       Was caller advised to seek appropriate level of care - yes/no: no      Details to clarify the request:  would like appointment today         Alissa Sierra

## 2021-04-28 NOTE — PROGRESS NOTES
1. Have you been to the ER, urgent care clinic since your last visit? Hospitalized since your last visit? No    2. Have you seen or consulted any other health care providers outside of the [address] [place] [address] since your last visit? Include any pap smears or colon screening.  No

## 2021-04-28 NOTE — TELEPHONE ENCOUNTER
Called pt, had to leave vm. Trying to reach her about coming on it to get tick removed. Also sent [address].  KT

## 2021-04-29 NOTE — PROGRESS NOTES
Myrna Palma is a 56 y.o. female who presents to the office today with the following:  Chief Complaint   Patient presents with    Tick Removal   Patient presents today with chief concern of having a tick in bedded in her vulvar area on the left. She had a tick in bedded there previously and became concerned when she had felt yesterday in the shower a hard lump that felt like it was under her skin in that area. No itching, discharge of which she is aware. The area is nontender. She has tried soaking in warm water and it has not dissipated. Still has many stressors going on in her life; divorce has not yet been finalized and she does have stressors with her two boys. Despite the stressors, she has noted that her shingles has stayed at [place] [address] and not recurred on the prophylactic of Valacyclovir. As well, the lesions that were in her scalp have healed up. She does have dry skin and itchiness the back of the scalp but she thinks maybe psoriasis. She usually treats us with certain shampoos    She also does have complaint of weight gain and not being able to lose the weight. She wonders if this may be secondary to her medications for her moods. She does not endorse any change in diet, but does state that she knows she needs to get more exercise and plans on ordering and a bike to ride. Allergies   Allergen Reactions    Macrobid [Nitrofurantoin Monohyd/M-Cryst] Palpitations    Meloxicam Other (comments)    Weed Pollen-Short Ragweed Rash       Current Outpatient Medications   Medication Sig    mupirocin (BACTROBAN) 2 % ointment Apply  to affected area daily.  valACYclovir (VALTREX) 500 mg tablet TAKE 1 TAB BY MOUTH TWO (2) TIMES A DAY. INDICATIONS: PREVENTION OF SHINGLES RECURRENCE    lisdexamfetamine (Vyvanse) 50 mg cap Take 1 Cap by mouth daily. Max Daily Amount: 50 mg.  Indications: attention deficit disorder with hyperactivity    ergocalciferol (ERGOCALCIFEROL) 1,250 mcg (50,000 unit) capsule Take 1 Cap by mouth every twenty-eight (28) days for 12 doses.  ferrous sulfate (IRON) 325 mg (65 mg iron) EC tablet TAKE 1 TABLET BY MOUTH TWICE A DAY    Synthroid 75 mcg tablet TAKE 1 TABLET BY MOUTH EVERY DAY BEFORE BREAKFAST    fluvoxaMINE (LUVOX) 50 mg tablet Take 1 Tab by mouth two (2) times a day.  omeprazole (PRILOSEC) 40 mg capsule TAKE 1 CAPSULE BY MOUTH EVERY DAY    amitriptyline (ELAVIL) 25 mg tablet Take 1 Tab by mouth nightly.  acyclovir (ZOVIRAX) 5 % topical cream Apply  to affected area five (5) times daily.  lidocaine 4 % spra 5-10 Sprays by Apply Externally route three (3) times daily as needed for Pain (itching on scalp). Indications: itching, skin irritation    gabapentin (NEURONTIN) 300 mg capsule Take 1 Cap by mouth three (3) times daily. Max Daily Amount: 900 mg. 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  acetaminophen (TYLENOL) 500 mg tablet Take 2 Tabs by mouth every eight (8) hours.  fluticasone propionate (Flonase Allergy Relief) 50 mcg/actuation nasal spray 2 Sprays by Both Nostrils route daily.  pseudoephedrine (Sudafed) 30 mg tablet Take  by mouth every four (4) hours as needed for Congestion.  guaiFENesin (MUCINEX) 1,200 mg Ta12 ER tablet Take 1 Tab by mouth two (2) times a day.  fluocinoNIDE (LIDEX) 0.05 % external solution APPLY TO SCALP TWICE A DAY    clindamycin (CLEOCIN T) 1 % lotion Apply  to affected area two (2) times a day. use thin film on affected area    acetaminophen (Tylenol Arthritis Pain) 650 mg TbER Take 650 mg by mouth every eight (8) hours as needed for Pain.  ascorbic acid, vitamin C, (Vitamin C) 500 mg tablet Take 1,000 mg by mouth daily.     CYANOCOBALAMIN, VITAMIN B-12, 5,000 Units by Does Not Apply route daily.  griseofulvin (GRIFULVIN V) 500 mg tab tablet TAKE 1 TAB BY MOUTH DAILY FOR 42 DAYS. No current facility-administered medications for this visit. Past Medical History:   Diagnosis Date    Allergies     Anemia        No past surgical history on file. Social History     Socioeconomic History    Marital status: LEGALLY      Spouse name: Not on file    Number of children: 2    Years of education: 12    Highest education level: Not on file   Social Needs    Financial resource strain: Not hard at all   Shaniqua-Verena insecurity     Worry: Never true     Inability: Never true   [employer] needs     Medical: No     Non-medical: No   Tobacco Use    Smoking status: Never Smoker    Smokeless tobacco: Never Used   Substance and Sexual Activity    Alcohol use:  Yes     Alcohol/week: 1.0 standard drinks     Types: 1 Shots of liquor per week     Comment: ususally 1 tequile shot per month    Drug use: Never   Lifestyle    Physical activity     Days per week: 0 days     Minutes per session: 0 min    Stress: Not at all   Relationships    Social connections     Talks on phone: More than three times a week     Gets together: More than three times a week     Attends Taoism service: Never     Active member of club or organization: No     Attends meetings of clubs or organizations: Not on file     Relationship status:    Other Topics Concern     Service No    Blood Transfusions No    Caffeine Concern No    Occupational Exposure No    Hobby Hazards No    Sleep Concern No    Stress Concern No    Weight Concern No    Special Diet No    Back Care No    Exercise No    Bike Helmet No    Seat Belt Yes    Self-Exams Yes       Social History     Tobacco Use   Smoking Status Never Smoker   Smokeless Tobacco Never Used       Family History   Problem Relation Age of Onset    Alcohol abuse Father     Dementia Father     Alcohol abuse Brother     Heart Disease Mother         open heart surgery with valve replacement    Alcohol abuse Sister     Cancer Maternal Aunt         unknown    Cancer Maternal Uncle 79        prostate    Arthritis-rheumatoid Paternal Aunt     Cancer Paternal Uncle 79        prostate ?  Alcohol abuse Paternal Grandfather        Vitals:    04/28/21 1643   BP: 138/82   BP 1 Location: Left upper arm   BP Patient Position: Sitting   BP Cuff Size: Adult long   Pulse: 90   Resp: 16   Temp: 97.8 °F (36.6 °C)   TempSrc: Temporal   SpO2: 97%   Weight: 174 lb 9.6 oz (79.2 kg)   Height: 5' 7\" (1.702 m)         3 most recent PHQ Screens 10/7/2020   Little interest or pleasure in doing things Not at all   Feeling down, depressed, irritable, or hopeless Several days   Total Score PHQ 2 1       ROS:  Denies fever, chills, cough, chest pain or pressure, SOB/DIYA,  nausea, vomiting, or diarrhea/constipation. No changes in vision or hearing. No new or worsening headaches. No edema, no claudication. Denies wt loss, wt gain, hemoptysis, hematochezia or melena. No dysuria, pyuria, frequency. No polydipsia, polyuria. No new weakness, arthralgias, myalgias. No weakness, dizziness, lightheadedness, numbness or tingling. No recent changes in moods. Physical Examination:    GENERAL APPEARANCE: NAD, appears stated age, comfortable  VITAL SIGNS:   Visit Vitals  /82 (BP 1 Location: Left upper arm, BP Patient Position: Sitting, BP Cuff Size: Adult long)   Pulse 90   Temp 97.8 °F (36.6 °C) (Temporal)   Resp 16   Ht 5' 7\" (1.702 m)   Wt 174 lb 9.6 oz (79.2 kg)   SpO2 97%   BMI 27.35 kg/m²     HEENT: Normocephalic and atraumatic. No scleral icterus. Pupils are equal, round, and reactive to light and accommodation. No conjunctival injection is noted. NECK: Supple. No lymphadenopathy or tenderness. CHEST: Symmetric. Nontender to palpation. LUNGS: Breath sounds are equal and clear bilaterally. No wheezes, rhonchi, or rales.   HEART: Regular rate and rhythm with normal S1 and S2. No murmurs, gallops, or rubs. GENITO-URINARY:  4-5 mm flesh colored nodule with mild induration, mild erythema but no scabbing or puncture marks and no black discoloration. NEUROLOGIC: CN II-XII intact; no focal neurological deficits. PSYCHIATRIC: The patient is awake, alert, and oriented x3. Appropriate mood and affect. SKIN: Warm, dry, and well perfused. Good turgor. Nodule as in     ASSESSMENT AND PLAN:  Nodule in the vulvar area:     Appears to be folliculitis or ingrown hair and will cover with mupirocin. If it starts to enlarge will have bactrim as a back up. Advised warm compresses multiple times throughout the day to try and help get the area to absorb and heal.    Weight gain:   We did discuss that medications may have an impact on weight gain, however, Given the stress of her current situation between work, and family, divorce, it may be a worthwhile trade off at this time to continue on the medication while adjusting diet and exercise regimen and then after she gets through some of her more stressful events, consider tapering down and coming off the medication at that time. She is agreeable at this time, but if she does change her mind, we can always revisit the conversation. Scalp: fungal and possible psoriasis. Improved. Continue with current treatment  Orders Placed This Encounter    TN IMMUNIZ ADMIN,1 SINGLE/COMB VAC/TOXOID    PNEUMOCOCCAL POLYSACCHARIDE VACCINE, 23-VALENT, ADULT OR IMMUNOSUPPRESSED PT DOSE,    mupirocin (BACTROBAN) 2 % ointment     Sig: Apply  to affected area daily. Dispense:  22 g     Refill:  0    trimethoprim-sulfamethoxazole (BACTRIM DS, SEPTRA DS) 160-800 mg per tablet     Sig: Take 1 Tab by mouth two (2) times a day for 7 days.      Dispense:  14 Tab     Refill:  0       Patient to return PRN for any new symptoms, call/come to clinic if notices any side effects from medication or any new side effects, and return for regularly scheduled visit     Patient verbalized understanding of and agreement to treatment plan. Patient has been advised to contact practice or seek care if condition persists or worsens. Lanie Ellis, DO      This documentation was facilitated by voice recognition software and may contain inadvertent typographical errors. Please note that this dictation was completed with [employer], the computer voice recognition software. Quite often unanticipated grammatical, syntax, homophones, and other interpretive errors are inadvertently transcribed by the computer software. Please disregard these errors. Please excuse any errors that have escaped final proofreading.

## 2021-05-05 ENCOUNTER — TELEPHONE (OUTPATIENT)
Dept: FAMILY MEDICINE CLINIC | Age: 61
End: 2021-05-05

## 2021-05-05 NOTE — TELEPHONE ENCOUNTER
----- Message from April M Sarah Angelo sent at 5/5/2021  3:12 PM EDT -----  Regarding: Didi Martini DO/telephone  General Message/Vendor Calls    Caller's first and last name:      Reason for call: Requested  a call back       Callback required yes/no and why: Yes       Best contact number(s): [phone]      Details to clarify the request: Patient stated that her insurance would like for her to take the generic brand of synthroid and she stated she tired that before and it doesn't work for her she's unable to take. She would like for Dr. Chandana Husain to reach out to her pharmacy and let them know this is what she's always taken.        April [address]

## 2021-05-06 ENCOUNTER — TELEPHONE (OUTPATIENT)
Dept: FAMILY MEDICINE CLINIC | Age: 61
End: 2021-05-06

## 2021-05-06 NOTE — TELEPHONE ENCOUNTER
Called the pharmacy, they state the [facility] [place] name requires a PA. Started new PA for Brand name Synthroid.     Kieran Peraza  Elyse RHODES Case ID: [id number]  Status  Sent to [facility]  Synthroid 75MCG tablets

## 2021-05-06 NOTE — TELEPHONE ENCOUNTER
PA came back, available without authorization. Called pharmacy she states she will contact the insurance company.  KT

## 2021-05-06 NOTE — TELEPHONE ENCOUNTER
----- Message from Carmelo Guadarrama sent at 5/6/2021 10:37 AM EDT -----  Regarding: Dr. Patel Weiss  General Message/Vendor Calls    Caller's first and last name: n/a      Reason for call: requesting pcp to contact the insurance company again for approval for \"Synthroid 75mg\";  Stated she can not take the generic form it does not work well with her body      Callback required yes/no and why: yes      Best contact number(s):[phone]      Details to clarify the request: stated that this rx is in her records at [facility] but for some reason insurance isn't approving it.        Carmelo Guadarrama

## 2021-05-20 ENCOUNTER — TELEPHONE (OUTPATIENT)
Dept: FAMILY MEDICINE CLINIC | Age: 61
End: 2021-05-20

## 2021-05-20 DIAGNOSIS — F90.9 ATTENTION DEFICIT HYPERACTIVITY DISORDER (ADHD), UNSPECIFIED ADHD TYPE: ICD-10-CM

## 2021-05-20 NOTE — TELEPHONE ENCOUNTER
Pt is calling to say pharmacy not giving her ,her vyvance  She says she needs pre auth/ pharm is [facility]

## 2021-05-21 NOTE — TELEPHONE ENCOUNTER
Per cover my med's it is a non preferred medications but I have not received an official letter from [employer]   I will place forms in your in basket .

## 2021-05-24 ENCOUNTER — TELEPHONE (OUTPATIENT)
Dept: FAMILY MEDICINE CLINIC | Age: 61
End: 2021-05-24

## 2021-05-24 NOTE — TELEPHONE ENCOUNTER
----- Message from Dottie Hawkins NP sent at 5/20/2021  8:42 PM EDT -----  Please schedule an appointment for Ms. Dykeslesly with blanche Ge or myself . Refill request for VyVanse. rx for controlled substance.

## 2021-05-24 NOTE — TELEPHONE ENCOUNTER
Please call the patient and update her. I sent in a new Rx but we are waiting to hear back from her insurance company.

## 2021-06-03 ENCOUNTER — TELEPHONE (OUTPATIENT)
Dept: FAMILY MEDICINE CLINIC | Age: 61
End: 2021-06-03

## 2021-06-03 ENCOUNTER — HOSPITAL ENCOUNTER (EMERGENCY)
Age: 61
Discharge: HOME | End: 2021-06-03
Attending: EMERGENCY MEDICINE
Payer: MEDICAID

## 2021-06-03 VITALS
OXYGEN SATURATION: 96 % | DIASTOLIC BLOOD PRESSURE: 84 MMHG | HEIGHT: 67 IN | HEART RATE: 108 BPM | RESPIRATION RATE: 16 BRPM | TEMPERATURE: 98.2 F | WEIGHT: 170 LBS | BODY MASS INDEX: 26.68 KG/M2 | SYSTOLIC BLOOD PRESSURE: 124 MMHG

## 2021-06-03 DIAGNOSIS — R23.8 SCALP IRRITATION: Primary | ICD-10-CM

## 2021-06-03 PROCEDURE — 99282 EMERGENCY DEPT VISIT SF MDM: CPT

## 2021-06-03 NOTE — ED PROVIDER NOTES
EMERGENCY DEPARTMENT HISTORY AND PHYSICAL EXAM          Date: (Not on file)  Patient Name: Josh Hahn    History of Presenting Illness     Chief Complaint   Patient presents with    Hair/Scalp Problem       History Provided By: Patient    HPI: Yuli Wise is a 56 y.o. female, pmhx listed below, who presents to the ED c/o scalp issue. Patient reports she has been dealing with chronic zoster on her right scalp for the past several years. Has been seen by her primary care doctor and has been taking daily acyclovir for the past several months. Also has been seen by dermatology for this issue. Reports she has had some new zoster lesions on the right over the past few weeks. States she noted a bump on her left occiput that felt like it was the size of a tick. Reports throughout the day today, she has felt increased swelling in that area. Feels like there is fluid inside. No fever. Reports she was recently on Bactrim for a folliculitis in her inguinal area and that helped clear up the scalp issues. PCP: Alex Erwin, DO    There are no other complaints, changes, or physical findings at this time. Past History       Past Medical History:  Past Medical History:   Diagnosis Date    Allergies     Anemia        Past Surgical History:  No past surgical history on file. Family History:  Family History   Problem Relation Age of Onset    Alcohol abuse Father     Dementia Father     Alcohol abuse Brother     Heart Disease Mother         open heart surgery with valve replacement    Alcohol abuse Sister     Cancer Maternal Aunt         unknown    Cancer Maternal Uncle 79        prostate    Arthritis-rheumatoid Paternal Aunt     Cancer Paternal Uncle 79        prostate ?  Alcohol abuse Paternal Grandfather        Social History:  Social History     Tobacco Use    Smoking status: Never Smoker    Smokeless tobacco: Never Used   Substance Use Topics    Alcohol use:  Yes Alcohol/week: 1.0 standard drinks     Types: 1 Shots of liquor per week     Comment: ususally 1 tequile shot per month    Drug use: Never       Current Outpatient Medications   Medication Sig Dispense Refill    lisdexamfetamine (Vyvanse) 50 mg cap Take 1 Capsule by mouth daily. Max Daily Amount: 50 mg. Indications: attention deficit disorder with hyperactivity 30 Capsule 0    griseofulvin (GRIFULVIN V) 500 mg tab tablet TAKE 1 TAB BY MOUTH DAILY FOR 42 DAYS. 42 Tab 0    mupirocin (BACTROBAN) 2 % ointment Apply  to affected area daily. 22 g 0    valACYclovir (VALTREX) 500 mg tablet TAKE 1 TAB BY MOUTH TWO (2) TIMES A DAY. INDICATIONS: PREVENTION OF SHINGLES RECURRENCE 120 Tab 0    ergocalciferol (ERGOCALCIFEROL) 1,250 mcg (50,000 unit) capsule Take 1 Cap by mouth every twenty-eight (28) days for 12 doses. 12 Cap 0    ferrous sulfate (IRON) 325 mg (65 mg iron) EC tablet TAKE 1 TABLET BY MOUTH TWICE A  Tab 0    Synthroid 75 mcg tablet TAKE 1 TABLET BY MOUTH EVERY DAY BEFORE BREAKFAST 90 Tab 1    fluvoxaMINE (LUVOX) 50 mg tablet Take 1 Tab by mouth two (2) times a day. 180 Tab 1    omeprazole (PRILOSEC) 40 mg capsule TAKE 1 CAPSULE BY MOUTH EVERY DAY 90 Cap 1    amitriptyline (ELAVIL) 25 mg tablet Take 1 Tab by mouth nightly. 90 Tab 1    acyclovir (ZOVIRAX) 5 % topical cream Apply  to affected area five (5) times daily. 5 g 0    lidocaine 4 % spra 5-10 Sprays by Apply Externally route three (3) times daily as needed for Pain (itching on scalp). Indications: itching, skin irritation 1 Bottle 1    gabapentin (NEURONTIN) 300 mg capsule Take 1 Cap by mouth three (3) times daily. Max Daily Amount: 900 mg. 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  90 g 0  acetaminophen (TYLENOL) 500 mg tablet Take 2 Tabs by mouth every eight (8) hours. 60 Tab 1    fluticasone propionate (Flonase Allergy Relief) 50 mcg/actuation nasal spray 2 Sprays by Both Nostrils route daily.  pseudoephedrine (Sudafed) 30 mg tablet Take  by mouth every four (4) hours as needed for Congestion.  guaiFENesin (MUCINEX) 1,200 mg Ta12 ER tablet Take 1 Tab by mouth two (2) times a day. 20 Tab 0    fluocinoNIDE (LIDEX) 0.05 % external solution APPLY TO SCALP TWICE A DAY      clindamycin (CLEOCIN T) 1 % lotion Apply  to affected area two (2) times a day. use thin film on affected area 1 Bottle 0    acetaminophen (Tylenol Arthritis Pain) 650 mg TbER Take 650 mg by mouth every eight (8) hours as needed for Pain.  ascorbic acid, vitamin C, (Vitamin C) 500 mg tablet Take 1,000 mg by mouth daily.  CYANOCOBALAMIN, VITAMIN B-12, 5,000 Units by Does Not Apply route daily. Allergies: Allergies   Allergen Reactions    Macrobid [Nitrofurantoin Monohyd/M-Cryst] Palpitations    Meloxicam Other (comments)    Weed Pollen-Short Ragweed Rash         Review of Systems   Review of Systems   Constitutional: Negative for chills and fever. HENT: Negative for congestion. Eyes: Negative for pain. Respiratory: Negative for shortness of breath. Cardiovascular: Negative for chest pain. Gastrointestinal: Negative for abdominal pain. Genitourinary: Negative for flank pain. Musculoskeletal: Negative for back pain. Neurological: Negative for headaches. Psychiatric/Behavioral: Negative for agitation. Physical Exam     Vital Signs-Reviewed the patient's vital signs. Patient Vitals for the past 12 hrs:   Pulse Resp BP SpO2   06/03/21 1821 (!) 108 16 124/84 96 %       Physical Exam  Constitutional:       Appearance: Normal appearance. HENT:      Head:      Comments: Scalp shows multiple scabs along right vertex.   Left superior occiput has an unroofed papule with surrounding erythema, total diameter is the size of a dime. No fluctuance or purulent drainage upon pressure. Lesion is nontender. Patient also has a scab on left vertex with no surrounding erythema. Mouth/Throat:      Mouth: Mucous membranes are moist.   Eyes:      Pupils: Pupils are equal, round, and reactive to light. Cardiovascular:      Rate and Rhythm: Tachycardia present. Pulmonary:      Effort: Pulmonary effort is normal.   Musculoskeletal:         General: No deformity. Skin:     General: Skin is warm and dry. Neurological:      Mental Status: She is alert and oriented to person, place, and time. Psychiatric:         Mood and Affect: Mood normal.      Comments: Anxious         Diagnostic Study Results     Labs -   No results found for this or any previous visit (from the past 12 hour(s)). Radiologic Studies -   No orders to display     CT Results  (Last 48 hours)    None        CXR Results  (Last 48 hours)    None            Medical Decision Making   I am the first provider for this patient. I reviewed the vital signs, available nursing notes, past medical history, past surgical history, family history and social history. Records Reviewed: Nursing Notes and Old Medical Records    Provider Notes (Medical Decision Making):   MDM: 69-year-old female with multiple scabs on scalp. Patient reports that lesions on the right are secondary to zoster, already being treated for zoster by primary care doctor. Lesions on the left do not have characteristic pain associated with zoster. Very small [place] of cellulitis surrounding papule that should heal without intervention. Recommend patient try not to scratch scalp and check area every 24 hours for increased redness. Patient has follow-up with dermatology tomorrow morning. Will advise her to show separate lesions to dermatology and allow their assessment. Will follow up as instructed.  All questions have been answered, pt voiced understanding and agreement with plan. Specific return precautions provided as well as instructions to return to the ED should sx worsen at any time. Vital signs stable for discharge. Diagnosis     Clinical Impression:   1. Scalp irritation            Disposition:  Discharged    Current Discharge Medication List            Please note, this dictation was completed with [employer], the computer voice recognition software. Quite often unanticipated grammatical, syntax, homophones, and other interpretive errors are inadvertently transcribed by the computer software. Please disregard these errors. Please excuse any errors that have escaped final proof reading.

## 2021-06-03 NOTE — TELEPHONE ENCOUNTER
Please check on this. I sent the Rx to [facility] on 5/21. I haven't received any paperwork from her insurance company. We submitted a PA.

## 2021-06-03 NOTE — DISCHARGE INSTRUCTIONS
Please follow-up with your dermatologist tomorrow as scheduled. There is a small, red area on your scalp that is about the size of a dime. There is no pus coming out of it and right now no fluid under it that needs to be drained. Please show this area to your dermatologist as well as your other concerns on your scalp.

## 2021-06-04 NOTE — TELEPHONE ENCOUNTER
Please call patient, her insurance will not cover Vyvanse. We will discuss alternatives at her appt on Monday.

## 2021-06-07 ENCOUNTER — OFFICE VISIT (OUTPATIENT)
Dept: FAMILY MEDICINE CLINIC | Age: 61
End: 2021-06-07
Payer: MEDICAID

## 2021-06-07 VITALS
RESPIRATION RATE: 17 BRPM | DIASTOLIC BLOOD PRESSURE: 72 MMHG | HEIGHT: 67 IN | OXYGEN SATURATION: 97 % | TEMPERATURE: 97.6 F | HEART RATE: 72 BPM | WEIGHT: 174.6 LBS | SYSTOLIC BLOOD PRESSURE: 138 MMHG | BODY MASS INDEX: 27.4 KG/M2

## 2021-06-07 DIAGNOSIS — D64.9 ANEMIA, UNSPECIFIED TYPE: ICD-10-CM

## 2021-06-07 DIAGNOSIS — F33.2 MDD (MAJOR DEPRESSIVE DISORDER), RECURRENT SEVERE, WITHOUT PSYCHOSIS (HCC): ICD-10-CM

## 2021-06-07 DIAGNOSIS — F41.9 ANXIETY: ICD-10-CM

## 2021-06-07 DIAGNOSIS — Z13.220 SCREENING, LIPID: ICD-10-CM

## 2021-06-07 DIAGNOSIS — E03.9 ACQUIRED HYPOTHYROIDISM: ICD-10-CM

## 2021-06-07 DIAGNOSIS — Z11.59 SCREENING FOR VIRAL DISEASE: ICD-10-CM

## 2021-06-07 DIAGNOSIS — E55.9 VITAMIN D DEFICIENCY: ICD-10-CM

## 2021-06-07 DIAGNOSIS — F90.9 ATTENTION DEFICIT HYPERACTIVITY DISORDER (ADHD), UNSPECIFIED ADHD TYPE: Primary | ICD-10-CM

## 2021-06-07 PROCEDURE — 99214 OFFICE O/P EST MOD 30 MIN: CPT | Performed by: NURSE PRACTITIONER

## 2021-06-07 RX ORDER — FLUVOXAMINE MALEATE 50 MG/1
50 TABLET ORAL
Qty: 180 TABLET | Refills: 1
Start: 2021-06-07 | End: 2021-09-14

## 2021-06-07 NOTE — PATIENT INSTRUCTIONS
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
## 2021-06-11 LAB — DRUGS UR: NORMAL

## 2021-06-11 NOTE — PROGRESS NOTES
Spoke with patient, confirmed with 2 identifiers, advised patient of lab results. Pt expressed understanding. KT      She states that [facility] is going to be faxing an order over for lab testing to check for antibodies related to a pneumonia vaccine. She spoke with them a few minutes ago, advised her that I would keep an eye out.  KT

## 2021-06-11 NOTE — PROGRESS NOTES
Please let the patient know that her urine test was good so we've completed the paperwork to get her Vyvanse renewed. If she hasn't heard anything in a week, call us back so we can look in to it.

## 2021-06-28 ENCOUNTER — DOCUMENTATION ONLY (OUTPATIENT)
Dept: FAMILY MEDICINE CLINIC | Age: 61
End: 2021-06-28

## 2021-06-28 ENCOUNTER — TELEPHONE (OUTPATIENT)
Dept: FAMILY MEDICINE CLINIC | Age: 61
End: 2021-06-28

## 2021-06-28 NOTE — TELEPHONE ENCOUNTER
Notified pt PA approved and sent to [facility]. MILAGROS    Kieran Hahn ([employer]lla Fat) - [id number]  Vyvanse 50MG capsules       Status: PA Response - Approved    Created: June 28th, 2021    Sent: June 28th, 2021    Faxed to [facility] [phone] .  MILAGROS

## 2021-06-28 NOTE — PROGRESS NOTES
Pt was placed in the [facility] schedule for a covid test. The office is no longer doing drive up testing. Pt needs to be seen. Called pt, advised her of the scheduling error. Transferred her to the  to schedule vv with Ms. Agnes Guo for tomorrow. Pt expressed understanding.  KT

## 2021-06-29 ENCOUNTER — TELEPHONE (OUTPATIENT)
Dept: FAMILY MEDICINE CLINIC | Age: 61
End: 2021-06-29

## 2021-06-29 ENCOUNTER — DOCUMENTATION ONLY (OUTPATIENT)
Dept: FAMILY MEDICINE CLINIC | Age: 61
End: 2021-06-29

## 2021-06-29 ENCOUNTER — VIRTUAL VISIT (OUTPATIENT)
Dept: FAMILY MEDICINE CLINIC | Age: 61
End: 2021-06-29
Payer: MEDICAID

## 2021-06-29 DIAGNOSIS — Z91.89 AT INCREASED RISK OF EXPOSURE TO COVID-19 VIRUS: Primary | ICD-10-CM

## 2021-06-29 DIAGNOSIS — J01.00 ACUTE NON-RECURRENT MAXILLARY SINUSITIS: ICD-10-CM

## 2021-06-29 DIAGNOSIS — R19.7 DIARRHEA, UNSPECIFIED TYPE: ICD-10-CM

## 2021-06-29 PROCEDURE — 99442 PR PHYS/QHP TELEPHONE EVALUATION 11-20 MIN: CPT | Performed by: NURSE PRACTITIONER

## 2021-06-29 RX ORDER — AZITHROMYCIN 250 MG/1
TABLET, FILM COATED ORAL
Qty: 6 TABLET | Refills: 0 | Status: SHIPPED | OUTPATIENT
Start: 2021-06-29 | End: 2021-08-06 | Stop reason: ALTCHOICE

## 2021-06-29 RX ORDER — METHYLPREDNISOLONE 4 MG/1
TABLET ORAL
Qty: 1 DOSE PACK | Refills: 0 | Status: SHIPPED | OUTPATIENT
Start: 2021-06-29 | End: 2021-08-06 | Stop reason: ALTCHOICE

## 2021-06-29 NOTE — TELEPHONE ENCOUNTER
----- Message from Anant Mart sent at 6/29/2021  4:07 PM EDT -----  Regarding: LOWELL Lester/Telephone  General Message/Vendor Calls    Caller's first and last name: pt      Reason for call: please call      Callback required yes/no and why: yes      Best contact number(s): [phone]      Details to clarify the request: pt stated they were going to call in Augmentin to her pharmacy but the pharmacy doesn't have a order called in.     Her Pharmacy is [facility] that's on file      Anant Mart

## 2021-06-29 NOTE — PROGRESS NOTES
Patient swabed per order for COVID. Sent to [address] [place] [address]. Patient instructed that PCP's office will contact once resulted.  Patient verbalized understanding

## 2021-06-29 NOTE — PROGRESS NOTES
Chief Complaint   Patient presents with    Cough    Sinus Infection     1. Have you been to the ER, urgent care clinic since your last visit? Hospitalized since your last visit? no    2. Have you seen or consulted any other health care providers outside of the [address] [place] [address] since your last visit? Include any pap smears or colon screeningno  Abuse Screening Questionnaire 6/7/2021   Do you ever feel afraid of your partner? N   Are you in a relationship with someone who physically or mentally threatens you? N   Is it safe for you to go home?  Y     3 most recent PHQ Screens 6/7/2021   Little interest or pleasure in doing things Not at all   Feeling down, depressed, irritable, or hopeless Not at all   Total Score PHQ 2 0

## 2021-06-29 NOTE — PROGRESS NOTES
Pt seen via VV today, Ms. Isreal Rosa ordered a covid test. Called [facility] sp/w Vicenta Cee, placed on nurse schedule for this afternoon. Called pt, she is aware of nurse appt. Pt advised to knock on the door and not enter the building because she has sx. Pt expressed understanding.  aware pt is on her way.  KT

## 2021-06-30 LAB
SARS-COV-2, NAA 2 DAY TAT: NORMAL
SARS-COV-2, NAA: NOT DETECTED

## 2021-06-30 NOTE — PROGRESS NOTES
Too Cook is a 56 y.o. female, evaluated via audio-only technology on 6/29/2021 for Cough and Sinus Infection  . Possible COVID exposure. Son 23years old COVID test pending . She would like to get Covid test done. She misplaced her 's license and was unable to test at [facility].  . At present she had started having hot flashes, fever x 7 days. Had laryngitis which is slowly resolving. Had diarrhea the week before    Assessment & Plan:   Diagnoses and all orders for this visit:    1. At increased risk of exposure to COVID-19 virus  -     NOVEL CORONAVIRUS (COVID-19); Future    2. Acute non-recurrent maxillary sinusitis  -     NOVEL CORONAVIRUS (COVID-19); Future    3. Diarrhea, unspecified type  -     NOVEL CORONAVIRUS (COVID-19); Future    Other orders  -     azithromycin (ZITHROMAX) 250 mg tablet; Take 2 tablets today, then take 1 tablet daily  -     methylPREDNISolone (MEDROL DOSEPACK) 4 mg tablet; Per dose pack instructions      The complexity of medical decision making for this visit is high         12  Subjective:       Prior to Admission medications    Medication Sig Start Date End Date Taking? Authorizing Provider   guaifenesin (MUCINEX PO) Take  by mouth. Yes Provider, Historical   azithromycin (ZITHROMAX) 250 mg tablet Take 2 tablets today, then take 1 tablet daily 6/29/21  Yes Moody Holter, NP   methylPREDNISolone (MEDROL DOSEPACK) 4 mg tablet Per dose pack instructions 6/29/21  Yes Moody Holter, NP   amitriptyline (ELAVIL) 25 mg tablet TAKE 1 TABLET BY MOUTH NIGHTLY 6/21/21  Yes Rowdy Bobby NP   fluvoxaMINE (LUVOX) 50 mg tablet Take 1 Tablet by mouth nightly. 6/7/21  Yes Rowdy Bobby NP   lisdexamfetamine (Vyvanse) 50 mg cap Take 1 Capsule by mouth daily. Max Daily Amount: 50 mg. Indications: attention deficit disorder with hyperactivity 5/21/21  Yes Rowdy Bobby NP   valACYclovir (VALTREX) 500 mg tablet TAKE 1 TAB BY MOUTH TWO (2) TIMES A DAY.  INDICATIONS: PREVENTION OF SHINGLES RECURRENCE 4/14/21  Yes Good, Tara R, DO   Synthroid 75 mcg tablet TAKE 1 TABLET BY MOUTH EVERY DAY BEFORE BREAKFAST 2/19/21  Yes Good, Tara R, DO   omeprazole (PRILOSEC) 40 mg capsule TAKE 1 CAPSULE BY MOUTH EVERY DAY 2/4/21  Yes Good, Tara R, DO   lidocaine 4 % spra 5-10 Sprays by Apply Externally route three (3) times daily as needed for Pain (itching on scalp). Indications: itching, skin irritation 11/6/20  Yes Good, Tara R, DO   lidocaine (XYLOCAINE) 4 % topical cream Apply  to affected area two (2) times daily as needed for Pain. 9/25/20  Yes Good, Tara R, DO   fluticasone propionate (Flonase Allergy Relief) 50 mcg/actuation nasal spray 2 Sprays by Both Nostrils route daily. Yes Provider, Historical   acetaminophen (Tylenol Arthritis Pain) 650 mg TbER Take 650 mg by mouth every eight (8) hours as needed for Pain. Yes Provider, Historical   ascorbic acid, vitamin C, (Vitamin C) 500 mg tablet Take 1,000 mg by mouth daily. Yes Provider, Historical   CYANOCOBALAMIN, VITAMIN B-12, 5,000 Units by Does Not Apply route daily. Yes Provider, Historical   valACYclovir (VALTREX) 500 mg tablet TAKE 1 TAB BY MOUTH TWO (2) TIMES A DAY.  INDICATIONS: PREVENTION OF SHINGLES RECURRENCE 2/19/21   Shelbie Phan DO     Patient Active Problem List   Diagnosis Code    Anemia D64.9    Acute on chronic anemia D64.9    Iron deficiency anemia D50.9    Right ear pain H92.01    Herpes zoster with ophthalmic complication I89.85    Hypothyroid E03.9    Vitamin D deficiency E55.9    MDD (major depressive disorder), recurrent severe, without psychosis ([facility] F33.2    Allergic rhinitis J30.9     Patient Active Problem List    Diagnosis Date Noted    Allergic rhinitis 02/22/2021    Right ear pain 09/24/2020    Herpes zoster with ophthalmic complication 27/23/5760    Iron deficiency anemia 05/05/2020    Anemia 04/29/2020    Acute on chronic anemia 04/29/2020    MDD (major depressive disorder), recurrent severe, without psychosis ([facility] 06/15/2017    Hypothyroid 05/23/2012    Vitamin D deficiency 05/23/2012     Current Outpatient Medications   Medication Sig Dispense Refill    guaifenesin (MUCINEX PO) Take  by mouth.  azithromycin (ZITHROMAX) 250 mg tablet Take 2 tablets today, then take 1 tablet daily 6 Tablet 0    methylPREDNISolone (MEDROL DOSEPACK) 4 mg tablet Per dose pack instructions 1 Dose Pack 0    amitriptyline (ELAVIL) 25 mg tablet TAKE 1 TABLET BY MOUTH NIGHTLY 90 Tablet 1    fluvoxaMINE (LUVOX) 50 mg tablet Take 1 Tablet by mouth nightly. 180 Tablet 1    lisdexamfetamine (Vyvanse) 50 mg cap Take 1 Capsule by mouth daily. Max Daily Amount: 50 mg. Indications: attention deficit disorder with hyperactivity 30 Capsule 0    valACYclovir (VALTREX) 500 mg tablet TAKE 1 TAB BY MOUTH TWO (2) TIMES A DAY. INDICATIONS: PREVENTION OF SHINGLES RECURRENCE 120 Tab 0    Synthroid 75 mcg tablet TAKE 1 TABLET BY MOUTH EVERY DAY BEFORE BREAKFAST 90 Tab 1    omeprazole (PRILOSEC) 40 mg capsule TAKE 1 CAPSULE BY MOUTH EVERY DAY 90 Cap 1    lidocaine 4 % spra 5-10 Sprays by Apply Externally route three (3) times daily as needed for Pain (itching on scalp). Indications: itching, skin irritation 1 Bottle 1    lidocaine (XYLOCAINE) 4 % topical cream Apply  to affected area two (2) times daily as needed for Pain. 90 g 0    fluticasone propionate (Flonase Allergy Relief) 50 mcg/actuation nasal spray 2 Sprays by Both Nostrils route daily.  acetaminophen (Tylenol Arthritis Pain) 650 mg TbER Take 650 mg by mouth every eight (8) hours as needed for Pain.  ascorbic acid, vitamin C, (Vitamin C) 500 mg tablet Take 1,000 mg by mouth daily.  CYANOCOBALAMIN, VITAMIN B-12, 5,000 Units by Does Not Apply route daily.        Allergies   Allergen Reactions    Macrobid [Nitrofurantoin Monohyd/M-Cryst] Palpitations    Meloxicam Other (comments)    Weed Pollen-Short Ragweed Rash Past Medical History:   Diagnosis Date    Allergies     Anemia      History reviewed. No pertinent surgical history. Family History   Problem Relation Age of Onset    Alcohol abuse Father     Dementia Father     Alcohol abuse Brother     Heart Disease Mother         open heart surgery with valve replacement    Alcohol abuse Sister     Cancer Maternal Aunt         unknown    Cancer Maternal Uncle 79        prostate    Arthritis-rheumatoid Paternal Aunt     Cancer Paternal Uncle 79        prostate ?  Alcohol abuse Paternal Grandfather      Social History     Tobacco Use    Smoking status: Never Smoker    Smokeless tobacco: Never Used   Substance Use Topics    Alcohol use: Yes     Alcohol/week: 1.0 standard drinks     Types: 1 Shots of liquor per week     Comment: ususally 1 tequile shot per month       ROS    Patient-Reported Vitals 9/18/2020   Patient-Reported LMP menopause        Kieran Hahn, who was evaluated through a patient-initiated, synchronous (real-time) audio only encounter, and/or her healthcare decision maker, is aware that it is a billable service, with coverage as determined by her insurance carrier. She provided verbal consent to proceed: Yes. She has not had a related appointment within my department in the past 7 days or scheduled within the next 24 hours.       Total Time: minutes: 11-20 minutes    Soha Mays NP

## 2021-08-06 ENCOUNTER — OFFICE VISIT (OUTPATIENT)
Dept: FAMILY MEDICINE CLINIC | Age: 61
End: 2021-08-06
Payer: MEDICAID

## 2021-08-06 VITALS
TEMPERATURE: 98.1 F | WEIGHT: 165.3 LBS | DIASTOLIC BLOOD PRESSURE: 76 MMHG | BODY MASS INDEX: 25.94 KG/M2 | SYSTOLIC BLOOD PRESSURE: 138 MMHG | HEIGHT: 67 IN | HEART RATE: 116 BPM | RESPIRATION RATE: 18 BRPM | OXYGEN SATURATION: 96 %

## 2021-08-06 DIAGNOSIS — K21.9 GASTROESOPHAGEAL REFLUX DISEASE WITHOUT ESOPHAGITIS: ICD-10-CM

## 2021-08-06 DIAGNOSIS — E53.9 VITAMIN B DEFICIENCY: ICD-10-CM

## 2021-08-06 DIAGNOSIS — L08.9 LOCAL INFECTION OF THE SKIN AND SUBCUTANEOUS TISSUE, UNSPECIFIED: ICD-10-CM

## 2021-08-06 DIAGNOSIS — F41.9 ANXIETY: ICD-10-CM

## 2021-08-06 DIAGNOSIS — F33.2 MDD (MAJOR DEPRESSIVE DISORDER), RECURRENT SEVERE, WITHOUT PSYCHOSIS (HCC): Primary | ICD-10-CM

## 2021-08-06 PROCEDURE — 99214 OFFICE O/P EST MOD 30 MIN: CPT | Performed by: NURSE PRACTITIONER

## 2021-08-06 RX ORDER — OMEPRAZOLE 40 MG/1
CAPSULE, DELAYED RELEASE ORAL
Qty: 90 CAPSULE | Refills: 3 | Status: SHIPPED | OUTPATIENT
Start: 2021-08-06 | End: 2022-10-17 | Stop reason: SDUPTHER

## 2021-08-06 RX ORDER — HYDROXYZINE PAMOATE 25 MG/1
CAPSULE ORAL
Qty: 30 CAPSULE | Refills: 2 | Status: SHIPPED | OUTPATIENT
Start: 2021-08-06 | End: 2021-12-05

## 2021-08-06 NOTE — PROGRESS NOTES
Chief Complaint   Patient presents with    Medication Evaluation     HPI:     is a 59 y.o. female  In today for follow up. CAROLINE: diagnosed about 5 years ago. She was seeing a hematologist Dr. Judy Mejía in [place], [place]. Last colonoscopy was 11 years ago when she turned 46. No regular NSAID use. Denies visible blood in her stool or dark, tarry stools. No recent transfusions. No concerns. ADHD: Has been on the same dose of Vyvanse for years. UDS as expected in June.     Hypothyroidism: On Synthroid 75 mcg.     Depression/anxiety: Taking Luvox QHS. Has previously seen a psych NP, no longer seeing her. Now seeing a counselor. See below. Scalp rash: Has been treated with Clindamycin, Keflex, fluconazole, griseofulvin, steroids. Has seen dermatology x 2 in [place] as well as dermatology in [place]. PCP felt it was possibly chronic shingles. Patient is now on Valtrex BID.     New issues: Pt in today for her check up labs. She also has a specialty lab order from her [address] [place] [address]. The patient notes poorly controlled anxiety of late. She has had a few episodes of panic where she feels shaky, overwhelmed. These happen a few times a week. She remains on her Luvox. She reports a sensitivity to medication, does not want to increase her dose. She was previously on Pristiq. She felt like this helped with depression but did not do much for her anxiety.     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     Allergies   Allergen Reactions    Macrobid [Nitrofurantoin Monohyd/M-Cryst] Palpitations    Meloxicam Other (comments)    Weed Pollen-Short Ragweed Rash       Current Outpatient Medications   Medication Sig    omeprazole (PRILOSEC) 40 mg capsule TAKE 1 CAPSULE BY MOUTH EVERY DAY    hydrOXYzine pamoate (VISTARIL) 25 mg capsule 1-2 capsules PO Q8H PRN anxiety/panic attacks    amitriptyline (ELAVIL) 25 mg tablet TAKE 1 TABLET BY MOUTH NIGHTLY    fluvoxaMINE (LUVOX) 50 mg tablet Take 1 Tablet by mouth nightly.  lisdexamfetamine (Vyvanse) 50 mg cap Take 1 Capsule by mouth daily. Max Daily Amount: 50 mg. Indications: attention deficit disorder with hyperactivity    valACYclovir (VALTREX) 500 mg tablet TAKE 1 TAB BY MOUTH TWO (2) TIMES A DAY. INDICATIONS: PREVENTION OF SHINGLES RECURRENCE    Synthroid 75 mcg tablet TAKE 1 TABLET BY MOUTH EVERY DAY BEFORE BREAKFAST    lidocaine 4 % spra 5-10 Sprays by Apply Externally route three (3) times daily as needed for Pain (itching on scalp). Indications: itching, skin irritation    lidocaine (XYLOCAINE) 4 % topical cream Apply  to affected area two (2) times daily as needed for Pain.  fluticasone propionate (Flonase Allergy Relief) 50 mcg/actuation nasal spray 2 Sprays by Both Nostrils route daily.  acetaminophen (Tylenol Arthritis Pain) 650 mg TbER Take 650 mg by mouth every eight (8) hours as needed for Pain.  ascorbic acid, vitamin C, (Vitamin C) 500 mg tablet Take 1,000 mg by mouth daily.  CYANOCOBALAMIN, VITAMIN B-12, 5,000 Units by Does Not Apply route daily. No current facility-administered medications for this visit. Past Medical History:   Diagnosis Date    Allergies     Anemia        Family History   Problem Relation Age of Onset    Alcohol abuse Father     Dementia Father     Alcohol abuse Brother     Heart Disease Mother         open heart surgery with valve replacement    Alcohol abuse Sister     Cancer Maternal Aunt         unknown    Cancer Maternal Uncle 79        prostate    Arthritis-rheumatoid Paternal Aunt     Cancer Paternal Uncle 79        prostate ?  Alcohol abuse Paternal Grandfather        ROS:  Denies fever, chills, cough, chest pain, SOB,  nausea, vomiting, diarrhea, dysuria. Denies rashes, wounds, arthralgias, weakness, numbness, visual changes, depression. Denies wt loss, wt gain, hemoptysis, hematochezia or melena.     Patient is not experiencing chest pain radiating to the jaw and/or down the arms.    Physical Examination:    /76 (BP 1 Location: Right arm, BP Patient Position: Sitting, BP Cuff Size: Adult)   Pulse (!) 116   Temp 98.1 °F (36.7 °C) (Temporal)   Resp 18   Ht 5' 7\" (1.702 m)   Wt 165 lb 4.8 oz (75 kg)   SpO2 96%   BMI 25.89 kg/m²     Wt Readings from Last 3 Encounters:   08/06/21 165 lb 4.8 oz (75 kg)   06/07/21 174 lb 9.6 oz (79.2 kg)   06/03/21 170 lb (77.1 kg)     Constitutional: WDWN Female in no acute distress  HENT:  NC/AT  Respiratory:  Respirations even and unlabored without use of accessory muscles, CTA throughout without wheezes, rales, rubs or rhonchi. Symmetrical chest expansion. Cardiac: RRR  Musculoskeletal:  No cyanosis, clubbing or edema of extremities. Moves all extremities without difficulty. Neurologic:  Smooth, even gait without assistance, CN 2-12 grossly intact. Skin: intact and warm to the touch, no rash   Psych: Pleasant and appropriate. Judgment normal. Alert and oriented x 3. Anxious at times when discussing certain things. ASSESSMENT AND PLAN:       ICD-10-CM ICD-9-CM    1. MDD (major depressive disorder), recurrent severe, without psychosis ([facility]  F33.2 296.33 hydrOXYzine pamoate (VISTARIL) 25 mg capsule   2. Anxiety  F41.9 300.00 hydrOXYzine pamoate (VISTARIL) 25 mg capsule   3. Gastroesophageal reflux disease without esophagitis  K21.9 530.81 omeprazole (PRILOSEC) 40 mg capsule   4. Vitamin B deficiency  E53.9 266.9 VITAMIN B12      VITAMIN B12   5. Local infection of the skin and subcutaneous tissue, unspecified  L08.9 686.9 IGG, SUBCLASS 2      IGG, SUBCLASS 2     Renew omeprazole for GERD. Doing well on Vyvanse. Discussed anxiety, breakthrough panic attacks. Add Vistaril to use PRN. Discussed MOA, potential s/e. Consider transitioning from Luvox back to Pristiq if no improvement with PRN medication. Continue talk therapy. Labs drawn for [facility] as well as her check up. Patient aware of plan of care and verbalized understanding. Questions answered. RTC 3 months, or sooner if needed.     Ama Vora, NP

## 2021-08-06 NOTE — PATIENT INSTRUCTIONS

## 2021-08-08 LAB
IGG2 SER-MCNC: 207 MG/DL (ref 130–555)
VIT B12 SERPL-MCNC: 755 PG/ML (ref 232–1245)

## 2021-08-09 NOTE — PROGRESS NOTES
Please fax IgG to her specialist at [facility] (see lab order under Media tab). I'm also missing the bulk of her labs. She had labs ordered on 6/7 that were supposed to be drawn when she came in the other day. Can we make sure these were done?

## 2021-08-11 DIAGNOSIS — F90.9 ATTENTION DEFICIT HYPERACTIVITY DISORDER (ADHD), UNSPECIFIED ADHD TYPE: ICD-10-CM

## 2021-08-11 NOTE — TELEPHONE ENCOUNTER
----- Message from Munch a Bunch Edilson sent at 8/10/2021  2:44 PM EDT -----  Regarding: Yung Ventura/telephone  General Message/Vendor Calls    Caller's first and last name:      Reason for call: status of Rx Vyvance? Callback required yes/no and why: yes      Best contact number(s): [phone]      Details to clarify the request: requesting status of Rx \"Vyvance\" that was to be ordered on last vis?       Yvonne Waggoner

## 2021-08-11 NOTE — TELEPHONE ENCOUNTER
Message routed to provider to approve refill. Requested Prescriptions     Pending Prescriptions Disp Refills    lisdexamfetamine (Vyvanse) 50 mg cap 30 Capsule 0     Sig: Take 1 Capsule by mouth daily. Max Daily Amount: 50 mg.

## 2021-08-12 ENCOUNTER — TELEPHONE (OUTPATIENT)
Dept: FAMILY MEDICINE CLINIC | Age: 61
End: 2021-08-12

## 2021-08-12 NOTE — TELEPHONE ENCOUNTER
----- Message from Alvin Sanz sent at 8/10/2021  2:44 PM EDT -----  Regarding: Patric Ventura/telephone  General Message/Vendor Calls    Caller's first and last name:      Reason for call: status of Rx Vyvance? Callback required yes/no and why: yes      Best contact number(s): [phone]      Details to clarify the request: requesting status of Rx \"Vyvance\" that was to be ordered on last vis?       Alvin Sanz

## 2021-08-13 NOTE — PROGRESS NOTES
Please fax labs to [facility] specialist. Please call [facility] and see if they were able to run the rest of her labs. If not, please have her come back in to be re-drawn. Thanks.

## 2021-08-18 ENCOUNTER — TELEPHONE (OUTPATIENT)
Dept: FAMILY MEDICINE CLINIC | Age: 61
End: 2021-08-18

## 2021-08-19 RX ORDER — GRISEOFULVIN 500 MG/1
500 TABLET ORAL DAILY
Qty: 14 TABLET | Refills: 0 | Status: CANCELLED | OUTPATIENT
Start: 2021-08-19 | End: 2021-09-02

## 2021-08-19 RX ORDER — LEVOTHYROXINE SODIUM 75 UG/1
75 TABLET ORAL
Qty: 90 TABLET | Refills: 1 | Status: CANCELLED | OUTPATIENT
Start: 2021-08-19

## 2021-08-23 NOTE — TELEPHONE ENCOUNTER
Patient has a virtual OV scheduled with Baldomero Dominguez on tomorrow, but thanks for the [facility].

## 2021-08-24 ENCOUNTER — VIRTUAL VISIT (OUTPATIENT)
Dept: FAMILY MEDICINE CLINIC | Age: 61
End: 2021-08-24
Payer: MEDICAID

## 2021-08-24 DIAGNOSIS — B35.0 TINEA CAPITIS: ICD-10-CM

## 2021-08-24 PROCEDURE — 99213 OFFICE O/P EST LOW 20 MIN: CPT | Performed by: NURSE PRACTITIONER

## 2021-08-24 RX ORDER — GRISEOFULVIN 500 MG/1
500 TABLET ORAL DAILY
Qty: 42 TABLET | Refills: 0 | Status: SHIPPED | OUTPATIENT
Start: 2021-08-24 | End: 2021-10-05

## 2021-08-24 NOTE — PROGRESS NOTES
Chief Complaint   Patient presents with    Lesion     ringworm on scalp     Abuse Screening Questionnaire 8/6/2021   Do you ever feel afraid of your partner? N   Are you in a relationship with someone who physically or mentally threatens you? N   Is it safe for you to go home? Y     3 most recent PHQ Screens 8/6/2021   Little interest or pleasure in doing things Not at all   Feeling down, depressed, irritable, or hopeless Not at all   Total Score PHQ 2 0     Learning Assessment 8/6/2021   PRIMARY LEARNER Patient   HIGHEST LEVEL OF EDUCATION - PRIMARY LEARNER  GRADUATED HIGH SCHOOL OR GED   BARRIERS PRIMARY LEARNER NONE   CO-LEARNER CAREGIVER No   PRIMARY LANGUAGE ENGLISH   LEARNER PREFERENCE PRIMARY READING   ANSWERED BY Patient   RELATIONSHIP SELF     The patient  does not have a history of falls. I did not complete a risk assessment.

## 2021-08-29 NOTE — ACP (ADVANCE CARE PLANNING)
Discussed importance of advanced medical directives with patient. Patient is capable of making decisions.  Ketty Moura NP-C

## 2021-08-29 NOTE — PROGRESS NOTES
Brooklyn Jay is a 59 y.o. female who was seen by synchronous (real-time) audio-video technology on 8/24/2021 for Lesion (ringworm on scalp)    Ring worm on scalp;Chronic problem reoccurring. She has tried tar shampoo with some relief. Requests rx for griseofulvan which is very effective. Assessment & Plan:   Diagnoses and all orders for this visit:    1. Tinea capitis  -     griseofulvin (GRIFULVIN V) 500 mg tab tablet; Take 1 Tablet by mouth daily for 42 days. The complexity of medical decision making for this visit is moderate         I spent at least 20 minutes on this visit with this established patient. Subjective:       Prior to Admission medications    Medication Sig Start Date End Date Taking? Authorizing Provider   griseofulvin (GRIFULVIN V) 500 mg tab tablet Take 1 Tablet by mouth daily for 42 days. 8/24/21 10/5/21 Yes Jerry Peters NP   lisdexamfetamine (Vyvanse) 50 mg cap Take 1 Capsule by mouth daily. Max Daily Amount: 50 mg. 8/12/21  Yes Malena Sahni NP   omeprazole (PRILOSEC) 40 mg capsule TAKE 1 CAPSULE BY MOUTH EVERY DAY 8/6/21  Yes Malena Sahni NP   hydrOXYzine pamoate (VISTARIL) 25 mg capsule 1-2 capsules PO Q8H PRN anxiety/panic attacks 8/6/21  Yes Malena Sahni NP   amitriptyline (ELAVIL) 25 mg tablet TAKE 1 TABLET BY MOUTH NIGHTLY 6/21/21  Yes Malena Sahni NP   fluvoxaMINE (LUVOX) 50 mg tablet Take 1 Tablet by mouth nightly. 6/7/21  Yes Malena Sahni NP   valACYclovir (VALTREX) 500 mg tablet TAKE 1 TAB BY MOUTH TWO (2) TIMES A DAY. INDICATIONS: PREVENTION OF SHINGLES RECURRENCE 4/14/21  Yes GoodTara R, DO   Synthroid 75 mcg tablet TAKE 1 TABLET BY MOUTH EVERY DAY BEFORE BREAKFAST 2/19/21  Yes Good, Tara R, DO   lidocaine 4 % spra 5-10 Sprays by Apply Externally route three (3) times daily as needed for Pain (itching on scalp).  Indications: itching, skin irritation 11/6/20  Yes Tara Coreas R, DO   lidocaine (XYLOCAINE) 4 % topical cream Apply  to affected area two (2) times daily as needed for Pain. 9/25/20  Yes Tara Coreas DO   fluticasone propionate (Flonase Allergy Relief) 50 mcg/actuation nasal spray 2 Sprays by Both Nostrils route daily. Yes Provider, Historical   acetaminophen (Tylenol Arthritis Pain) 650 mg TbER Take 650 mg by mouth every eight (8) hours as needed for Pain. Yes Provider, Historical   ascorbic acid, vitamin C, (Vitamin C) 500 mg tablet Take 1,000 mg by mouth daily. Yes Provider, Historical   CYANOCOBALAMIN, VITAMIN B-12, 5,000 Units by Does Not Apply route daily. Yes Provider, Historical   valACYclovir (VALTREX) 500 mg tablet TAKE 1 TAB BY MOUTH TWO (2) TIMES A DAY. INDICATIONS: PREVENTION OF SHINGLES RECURRENCE 2/19/21   Andressa Cleary DO     Patient Active Problem List   Diagnosis Code    Acute on chronic anemia D64.9    Iron deficiency anemia D50.9    Right ear pain H92.01    Herpes zoster with ophthalmic complication S73.35    Hypothyroid E03.9    Vitamin D deficiency E55.9    MDD (major depressive disorder), recurrent severe, without psychosis ([facility] F33.2    Allergic rhinitis J30.9     Patient Active Problem List    Diagnosis Date Noted    Allergic rhinitis 02/22/2021    Right ear pain 09/24/2020    Herpes zoster with ophthalmic complication 45/35/8948    Iron deficiency anemia 05/05/2020    Acute on chronic anemia 04/29/2020    MDD (major depressive disorder), recurrent severe, without psychosis ([facility] 06/15/2017    Hypothyroid 05/23/2012    Vitamin D deficiency 05/23/2012     Current Outpatient Medications   Medication Sig Dispense Refill    griseofulvin (GRIFULVIN V) 500 mg tab tablet Take 1 Tablet by mouth daily for 42 days. 42 Tablet 0    lisdexamfetamine (Vyvanse) 50 mg cap Take 1 Capsule by mouth daily.  Max Daily Amount: 50 mg. 30 Capsule 0    omeprazole (PRILOSEC) 40 mg capsule TAKE 1 CAPSULE BY MOUTH EVERY DAY 90 Capsule 3    hydrOXYzine pamoate (VISTARIL) 25 mg capsule 1-2 capsules PO Q8H PRN anxiety/panic attacks 30 Capsule 2    amitriptyline (ELAVIL) 25 mg tablet TAKE 1 TABLET BY MOUTH NIGHTLY 90 Tablet 1    fluvoxaMINE (LUVOX) 50 mg tablet Take 1 Tablet by mouth nightly. 180 Tablet 1    valACYclovir (VALTREX) 500 mg tablet TAKE 1 TAB BY MOUTH TWO (2) TIMES A DAY. INDICATIONS: PREVENTION OF SHINGLES RECURRENCE 120 Tab 0    Synthroid 75 mcg tablet TAKE 1 TABLET BY MOUTH EVERY DAY BEFORE BREAKFAST 90 Tab 1    lidocaine 4 % spra 5-10 Sprays by Apply Externally route three (3) times daily as needed for Pain (itching on scalp). Indications: itching, skin irritation 1 Bottle 1    lidocaine (XYLOCAINE) 4 % topical cream Apply  to affected area two (2) times daily as needed for Pain. 90 g 0    fluticasone propionate (Flonase Allergy Relief) 50 mcg/actuation nasal spray 2 Sprays by Both Nostrils route daily.  acetaminophen (Tylenol Arthritis Pain) 650 mg TbER Take 650 mg by mouth every eight (8) hours as needed for Pain.  ascorbic acid, vitamin C, (Vitamin C) 500 mg tablet Take 1,000 mg by mouth daily.  CYANOCOBALAMIN, VITAMIN B-12, 5,000 Units by Does Not Apply route daily. Allergies   Allergen Reactions    Macrobid [Nitrofurantoin Monohyd/M-Cryst] Palpitations    Meloxicam Other (comments)    Weed Pollen-Short Ragweed Rash     Past Medical History:   Diagnosis Date    Allergies     Anemia      History reviewed. No pertinent surgical history. Family History   Problem Relation Age of Onset    Alcohol abuse Father     Dementia Father     Alcohol abuse Brother     Heart Disease Mother         open heart surgery with valve replacement    Alcohol abuse Sister     Cancer Maternal Aunt         unknown    Cancer Maternal Uncle 79        prostate    Arthritis-rheumatoid Paternal Aunt     Cancer Paternal Uncle 79        prostate ?     Alcohol abuse Paternal Grandfather      Social History     Tobacco Use    Smoking status: Never Smoker    Smokeless tobacco: Never Used   Substance Use Topics    Alcohol use: Yes     Alcohol/week: 1.0 standard drinks     Types: 1 Shots of liquor per week     Comment: ususally 1 tequile shot per month       ROS  Pertinent items noted in the HPI  Objective:     Patient-Reported Vitals 8/24/2021   Patient-Reported Weight 165lb   Patient-Reported LMP -            Constitutional: [x] Appears well-developed and well-nourished [x] No apparent distress      [] Abnormal -     Mental status: [x] Alert and awake  [x] Oriented to person/place/time [x] Able to follow commands    [] Abnormal -     Eyes:   EOM    [x]  Normal    [] Abnormal -   Sclera  [x]  Normal    [] Abnormal -          Discharge [x]  None visible   [] Abnormal -     HENT: [x] Normocephalic, atraumatic  [] Abnormal -   [x] Mouth/Throat: Mucous membranes are moist    External Ears [x] Normal  [] Abnormal -    Neck: [x] No visualized mass [] Abnormal -     Pulmonary/Chest: [x] Respiratory effort normal   [x] No visualized signs of difficulty breathing or respiratory distress        [] Abnormal -      Musculoskeletal:   [x] Normal gait with no signs of ataxia         [x] Normal range of motion of neck        [] Abnormal -     Neurological:        [x] No Facial Asymmetry (Cranial nerve 7 motor function) (limited exam due to video visit)          [x] No gaze palsy        [] Abnormal -          Skin:        [] No significant exanthematous lesions or discoloration noted on facial skin         [x] Abnormal - Annular raised erythematous patches on scalp. Psychiatric:       [x] Normal Affect [] Abnormal -        [x] No Hallucinations    Other pertinent observable physical exam findings:-        We discussed the expected course, resolution and complications of the diagnosis(es) in detail. Medication risks, benefits, costs, interactions, and alternatives were discussed as indicated.   I advised her to contact the office if her condition worsens, changes or fails to improve as anticipated. She expressed understanding with the diagnosis(es) and plan. Kieran Hahn, was evaluated through a synchronous (real-time) audio-video encounter. The patient (or guardian if applicable) is aware that this is a billable service. Verbal consent to proceed has been obtained within the past 12 months. The visit was conducted pursuant to the emergency declaration under the [address] [place] [address] and the [employer] General Act. Patient identification was verified, and a caregiver was present when appropriate. The patient was located in a state where the provider was credentialed to provide care.     Follow up prn if symptoms persit or worsen  Tracey Cabral, NP

## 2021-09-01 ENCOUNTER — OFFICE VISIT (OUTPATIENT)
Dept: FAMILY MEDICINE CLINIC | Age: 61
End: 2021-09-01
Payer: MEDICAID

## 2021-09-01 VITALS
HEIGHT: 67 IN | OXYGEN SATURATION: 97 % | RESPIRATION RATE: 18 BRPM | WEIGHT: 165.8 LBS | SYSTOLIC BLOOD PRESSURE: 124 MMHG | HEART RATE: 110 BPM | BODY MASS INDEX: 26.02 KG/M2 | TEMPERATURE: 97.2 F | DIASTOLIC BLOOD PRESSURE: 70 MMHG

## 2021-09-01 DIAGNOSIS — F33.2 MDD (MAJOR DEPRESSIVE DISORDER), RECURRENT SEVERE, WITHOUT PSYCHOSIS (HCC): ICD-10-CM

## 2021-09-01 DIAGNOSIS — B35.0 TINEA CAPITIS: Primary | ICD-10-CM

## 2021-09-01 DIAGNOSIS — F43.10 PTSD (POST-TRAUMATIC STRESS DISORDER): ICD-10-CM

## 2021-09-01 DIAGNOSIS — L50.9 URTICARIA: ICD-10-CM

## 2021-09-01 LAB
ALBUMIN SERPL-MCNC: 4.5 G/DL (ref 3.8–4.8)
ALBUMIN/GLOB SERPL: 2 {RATIO} (ref 1.2–2.2)
ALP SERPL-CCNC: 113 IU/L (ref 48–121)
ALT SERPL-CCNC: 9 IU/L (ref 0–32)
AST SERPL-CCNC: 19 IU/L (ref 0–40)
BILIRUB SERPL-MCNC: 0.2 MG/DL (ref 0–1.2)
BUN SERPL-MCNC: 24 MG/DL (ref 8–27)
BUN/CREAT SERPL: 24 (ref 12–28)
CALCIUM SERPL-MCNC: 10.7 MG/DL (ref 8.7–10.3)
CHLORIDE SERPL-SCNC: 101 MMOL/L (ref 96–106)
CHOLEST SERPL-MCNC: 202 MG/DL (ref 100–199)
CO2 SERPL-SCNC: 20 MMOL/L (ref 20–29)
CREAT SERPL-MCNC: 1.01 MG/DL (ref 0.57–1)
FERRITIN SERPL-MCNC: 26 NG/ML (ref 15–150)
GLOBULIN SER CALC-MCNC: 2.3 G/DL (ref 1.5–4.5)
GLUCOSE SERPL-MCNC: 95 MG/DL (ref 65–99)
HDLC SERPL-MCNC: 58 MG/DL
INTERPRETATION: NORMAL
IRON SATN MFR SERPL: 55 % (ref 15–55)
IRON SERPL-MCNC: 187 UG/DL (ref 27–139)
LDLC SERPL CALC-MCNC: 119 MG/DL (ref 0–99)
POTASSIUM SERPL-SCNC: 4.3 MMOL/L (ref 3.5–5.2)
PROT SERPL-MCNC: 6.8 G/DL (ref 6–8.5)
SODIUM SERPL-SCNC: 140 MMOL/L (ref 134–144)
SPECIMEN STATUS REPORT, ROLRST: NORMAL
T4 FREE SERPL-MCNC: 1.56 NG/DL (ref 0.82–1.77)
TIBC SERPL-MCNC: 340 UG/DL (ref 250–450)
TRIGL SERPL-MCNC: 140 MG/DL (ref 0–149)
TSH SERPL DL<=0.005 MIU/L-ACNC: 0.96 UIU/ML (ref 0.45–4.5)
UIBC SERPL-MCNC: 153 UG/DL (ref 118–369)
VLDLC SERPL CALC-MCNC: 25 MG/DL (ref 5–40)

## 2021-09-01 PROCEDURE — 99214 OFFICE O/P EST MOD 30 MIN: CPT | Performed by: NURSE PRACTITIONER

## 2021-09-01 NOTE — PROGRESS NOTES
1. Have you been to the ER, urgent care clinic since your last visit? Hospitalized since your last visit? No    2. Have you seen or consulted any other health care providers outside of the [address] [place] [address] since your last visit? Include any pap smears or colon screening.  No      Chief Complaint   Patient presents with    Hair/Scalp Problem         Visit Vitals  /70 (BP 1 Location: Right arm, BP Patient Position: Sitting, BP Cuff Size: Adult)   Pulse (!) 110   Temp 97.2 °F (36.2 °C) (Temporal)   Resp 18   Ht 5' 7\" (1.702 m)   Wt 165 lb 12.8 oz (75.2 kg)   SpO2 97%   BMI 25.97 kg/m²       Pain Scale: /10  Pain Location:

## 2021-09-04 NOTE — ACP (ADVANCE CARE PLANNING)
Discussed importance of advanced medical directives with patient. Patient is capable of making decisions.  Trudy Rodas NP-C

## 2021-09-04 NOTE — PROGRESS NOTES
Subjective:     Deangelo Rushing is a 59 y.o. female who presents today with the following:  Chief Complaint   Patient presents with    Hair/Scalp Problem       Patient Active Problem List   Diagnosis Code    Acute on chronic anemia D64.9    Iron deficiency anemia D50.9    Right ear pain H92.01    Herpes zoster with ophthalmic complication Q51.13    Hypothyroid E03.9    Vitamin D deficiency E55.9    MDD (major depressive disorder), recurrent severe, without psychosis ([facility] F33.2    Allergic rhinitis J30.9         COMPLIANT WITH MEDICATION:    Denies chest pain, dyspnea, palpitations, headache and blurred vision. Blood pressure normotensive. Tinea capitus treated with griseofulvin. Scalp continues to have intense itching and excoriation from scratching,  Bald areas forming. . High stress going through a divorce. Has an rx for hydroxyzine. She has not taken it. We discussed how the medication will help with stress,anxiety ,sleep and itching. Also dicussed food allergies. depression screening addressed not at risk    abuse screening addressed denies    learning assessment addressed reviewed nurses notes    fall risk addressed not at risk    HM: addressed sick visit today deferred til next visit.     ROS:  Gen: denies fever, chills, fatigue, weight loss, weight gain  HEENT:denies blurry vision, nasal congestion, sore throat  Resp: denies dypsnea, cough, wheezing  CV: denies chest pain radiating to the jaws or arms, palpitations, lower extremity edema  Abd: denies nausea, vomiting, diarrhea, constipation  Neuro: denies numbness/tingling  Endo: denies polyuria, polydipsia, heat/cold intolerance  Heme: no lymphadenopathy    Allergies   Allergen Reactions    Macrobid [Nitrofurantoin Monohyd/M-Cryst] Palpitations    Meloxicam Other (comments)    Weed Pollen-Short Ragweed Rash         Current Outpatient Medications:     griseofulvin (GRIFULVIN V) 500 mg tab tablet, Take 1 Tablet by mouth daily for 42 days., Disp: 42 Tablet, Rfl: 0    lisdexamfetamine (Vyvanse) 50 mg cap, Take 1 Capsule by mouth daily. Max Daily Amount: 50 mg., Disp: 30 Capsule, Rfl: 0    omeprazole (PRILOSEC) 40 mg capsule, TAKE 1 CAPSULE BY MOUTH EVERY DAY, Disp: 90 Capsule, Rfl: 3    hydrOXYzine pamoate (VISTARIL) 25 mg capsule, 1-2 capsules PO Q8H PRN anxiety/panic attacks, Disp: 30 Capsule, Rfl: 2    amitriptyline (ELAVIL) 25 mg tablet, TAKE 1 TABLET BY MOUTH NIGHTLY, Disp: 90 Tablet, Rfl: 1    fluvoxaMINE (LUVOX) 50 mg tablet, Take 1 Tablet by mouth nightly., Disp: 180 Tablet, Rfl: 1    valACYclovir (VALTREX) 500 mg tablet, TAKE 1 TAB BY MOUTH TWO (2) TIMES A DAY. INDICATIONS: PREVENTION OF SHINGLES RECURRENCE, Disp: 120 Tab, Rfl: 0    Synthroid 75 mcg tablet, TAKE 1 TABLET BY MOUTH EVERY DAY BEFORE BREAKFAST, Disp: 90 Tab, Rfl: 1    lidocaine 4 % spra, 5-10 Sprays by Apply Externally route three (3) times daily as needed for Pain (itching on scalp). Indications: itching, skin irritation, Disp: 1 Bottle, Rfl: 1    lidocaine (XYLOCAINE) 4 % topical cream, Apply  to affected area two (2) times daily as needed for Pain., Disp: 90 g, Rfl: 0    fluticasone propionate (Flonase Allergy Relief) 50 mcg/actuation nasal spray, 2 Sprays by Both Nostrils route daily. , Disp: , Rfl:     acetaminophen (Tylenol Arthritis Pain) 650 mg TbER, Take 650 mg by mouth every eight (8) hours as needed for Pain., Disp: , Rfl:     ascorbic acid, vitamin C, (Vitamin C) 500 mg tablet, Take 1,000 mg by mouth daily. , Disp: , Rfl:     CYANOCOBALAMIN, VITAMIN B-12,, 5,000 Units by Does Not Apply route daily. , Disp: , Rfl:     Past Medical History:   Diagnosis Date    Allergies     Anemia        History reviewed. No pertinent surgical history.     Social History     Tobacco Use   Smoking Status Never Smoker   Smokeless Tobacco Never Used       Social History     Socioeconomic History    Marital status: LEGALLY      Spouse name: Not on file    Number of children: 2    Years of education: 12    Highest education level: Not on file   Tobacco Use    Smoking status: Never Smoker    Smokeless tobacco: Never Used   Vaping Use    Vaping Use: Never used   Substance and Sexual Activity    Alcohol use: Yes     Alcohol/week: 1.0 standard drinks     Types: 1 Shots of liquor per week     Comment: ususally 1 tequile shot per month    Drug use: Never   Other Topics Concern     Service No    Blood Transfusions No    Caffeine Concern No    Occupational Exposure No    Hobby Hazards No    Sleep Concern No    Stress Concern No    Weight Concern No    Special Diet No    Back Care No    Exercise No    Bike Helmet No    Seat Belt Yes    Self-Exams Yes     Social Determinants of Health     Financial Resource Strain:     Difficulty of Paying Living Expenses:    Food Insecurity:     Worried About Running Out of Food in the Last Year:     Ran Out of Food in the Last Year:    Transportation Needs:     Lack of Transportation (Medical):  Lack of Transportation (Non-Medical):    Physical Activity:     Days of Exercise per Week:     Minutes of Exercise per Session:    Stress:     Feeling of Stress :    Social Connections:     Frequency of Communication with Friends and Family:     Frequency of Social Gatherings with Friends and Family:     Attends Sabianist Services:     Active Member of Clubs or Organizations:     Attends Club or Organization Meetings:     Marital Status:        Family History   Problem Relation Age of Onset    Alcohol abuse Father     Dementia Father     Alcohol abuse Brother     Heart Disease Mother         open heart surgery with valve replacement    Alcohol abuse Sister     Cancer Maternal Aunt         unknown    Cancer Maternal Uncle 79        prostate    Arthritis-rheumatoid Paternal Aunt     Cancer Paternal Uncle 79        prostate ?     Alcohol abuse Paternal Grandfather          Objective:     Visit Vitals  BP 124/70 (BP 1 Location: Right arm, BP Patient Position: Sitting, BP Cuff Size: Adult)   Pulse (!) 110   Temp 97.2 °F (36.2 °C) (Temporal)   Resp 18   Ht 5' 7\" (1.702 m)   Wt 165 lb 12.8 oz (75.2 kg)   SpO2 97%   BMI 25.97 kg/m²     Body mass index is 25.97 kg/m². General: Alert and oriented. No acute distress. Well nourished  HEENT : Scalp patches of hair loss  Ears:TMs are normal. Canals are clear. Eyes: pupils equal, round, react to light and accommodation. Extra ocular movements intact. Nose: patent. Mouth and throat is clear. Neck:supple full range of motion no thyromegaly. Trachea midline, No carotid bruits. No significant lymphadenopathy  Lungs[de-identified] clear to auscultation without wheezes, rales, or rhonchi. Heart :RRR, S1 & S2 are normal intensity. No murmur; no gallop  Abdomen: bowel sounds active. No tenderness, guarding, rebound, masses, hepatic or spleen enlargement  Back: no CVA tenderness. Extremities: without clubbing, cyanosis, or edema  Pulses: radial and femoral pulses are normal  Neuro: HMF intact. Cranial nerves II through XII grossly normal.  Motor: is 5 over 5 and symmetrical.   Deep tendon reflexes: +2 equal  Psych:appropriate behavior, mood, affect and judgement.    Results for orders placed or performed in visit on 08/06/21   VITAMIN B12   Result Value Ref Range    Vitamin B12 755 232 - 1,245 pg/mL   IGG, SUBCLASS 2   Result Value Ref Range    IgG, Subclass 2 207 130 - 332 mg/dL   METABOLIC PANEL, COMPREHENSIVE   Result Value Ref Range    Glucose 95 65 - 99 mg/dL    BUN 24 8 - 27 mg/dL    Creatinine 1.01 (H) 0.57 - 1.00 mg/dL    GFR est non-AA 59 (L) >59 mL/min/1.73    GFR est AA 68 >59 mL/min/1.73    BUN/Creatinine ratio 24 12 - 28    Sodium 140 134 - 144 mmol/L    Potassium 4.3 3.5 - 5.2 mmol/L    Chloride 101 96 - 106 mmol/L    CO2 20 20 - 29 mmol/L    Calcium 10.7 (H) 8.7 - 10.3 mg/dL    Protein, total 6.8 6.0 - 8.5 g/dL    Albumin 4.5 3.8 - 4.8 g/dL    GLOBULIN, TOTAL 2.3 1.5 - 4.5 g/dL A-G Ratio 2.0 1.2 - 2.2    Bilirubin, total 0.2 0.0 - 1.2 mg/dL    Alk. phosphatase 113 48 - 121 IU/L    AST (SGOT) 19 0 - 40 IU/L    ALT (SGPT) 9 0 - 32 IU/L   LIPID PANEL   Result Value Ref Range    Cholesterol, total 202 (H) 100 - 199 mg/dL    Triglyceride 140 0 - 149 mg/dL    HDL Cholesterol 58 >39 mg/dL    VLDL, calculated 25 5 - 40 mg/dL    LDL, calculated 119 (H) 0 - 99 mg/dL   IRON PROFILE   Result Value Ref Range    TIBC 340 250 - 450 ug/dL    UIBC 153 118 - 369 ug/dL    Iron 187 (H) 27 - 139 ug/dL    Iron % saturation 55 15 - 55 %   CKD REPORT   Result Value Ref Range    Interpretation Note    TSH 3RD GENERATION   Result Value Ref Range    TSH 0.961 0.450 - 4.500 uIU/mL   T4, FREE   Result Value Ref Range    T4, Free 1.56 0.82 - 1.77 ng/dL   FERRITIN   Result Value Ref Range    Ferritin 26 15 - 150 ng/mL   SPECIMEN STATUS REPORT   Result Value Ref Range    SPECIMEN STATUS REPORT COMMENT        No results found for this visit on 09/01/21. Assessment/ Plan:     1. MDD (major depressive disorder), recurrent severe, without psychosis ([facility]  Take medications as prescribed as noted above     2. Tinea capitis    - REFERRAL TO ALLERGY consider referral to dermatology. 3. PTSD (post-traumatic stress disorder)  Review medications. Consider referral to behavorial health for counseling and medication adjustment if indicated. 4. Urticaria  - REFERRAL TO ALLERGY consider referral to dermatology. Orders Placed This Encounter    REFERRAL TO ALLERGY     Referral Priority:   Routine     Referral Type:   Consultation     Referral Reason:   Specialty Services Required     Referred to Provider:   Alexa Igelsias MD         Verbal and written instructions (see AVS) provided. Patient expresses understanding of diagnosis and treatment plan.     Health Maintenance Due   Topic Date Due    Hepatitis C Screening  Never done    PAP AKA CERVICAL CYTOLOGY  Never done    Colorectal Cancer Screening Combo  Never done    Breast Cancer Screen Mammogram  Never done               Federico Vargas, FNP-C

## 2021-09-13 DIAGNOSIS — F41.9 ANXIETY: ICD-10-CM

## 2021-09-14 ENCOUNTER — TELEPHONE (OUTPATIENT)
Dept: FAMILY MEDICINE CLINIC | Age: 61
End: 2021-09-14

## 2021-09-14 DIAGNOSIS — F41.9 ANXIETY: ICD-10-CM

## 2021-09-14 NOTE — TELEPHONE ENCOUNTER
----- Message from Antonio Yo NP sent at 9/13/2021  9:10 PM EDT -----  While dealing with her current stressors, I would recommend increasing Luvox from 50 mg to 100 mg . If receptive please ask her which pharmacy. Therapy would also be beneficial .  If interested please ask her local if not where is she willing to travel if needed. Thanks!  DL

## 2021-09-15 ENCOUNTER — TELEPHONE (OUTPATIENT)
Dept: FAMILY MEDICINE CLINIC | Age: 61
End: 2021-09-15

## 2021-09-15 NOTE — TELEPHONE ENCOUNTER
----- Message from Eloisa Haines NP sent at 9/13/2021  9:10 PM EDT -----  While dealing with her current stressors, I would recommend increasing Luvox from 50 mg to 100 mg . If receptive please ask her which pharmacy. Therapy would also be beneficial .  If interested please ask her local if not where is she willing to travel if needed. Thanks!  DL

## 2021-09-24 DIAGNOSIS — F90.9 ATTENTION DEFICIT HYPERACTIVITY DISORDER (ADHD), UNSPECIFIED ADHD TYPE: ICD-10-CM

## 2021-10-05 ENCOUNTER — VIRTUAL VISIT (OUTPATIENT)
Dept: FAMILY MEDICINE CLINIC | Age: 61
End: 2021-10-05
Payer: MEDICAID

## 2021-10-05 DIAGNOSIS — B35.0 TINEA CAPITIS: ICD-10-CM

## 2021-10-05 DIAGNOSIS — F33.2 MDD (MAJOR DEPRESSIVE DISORDER), RECURRENT SEVERE, WITHOUT PSYCHOSIS (HCC): Primary | ICD-10-CM

## 2021-10-05 DIAGNOSIS — F41.9 ANXIETY: ICD-10-CM

## 2021-10-05 PROCEDURE — 99443 PR PHYS/QHP TELEPHONE EVALUATION 21-30 MIN: CPT | Performed by: NURSE PRACTITIONER

## 2021-10-05 RX ORDER — DESVENLAFAXINE 25 MG/1
25 TABLET, EXTENDED RELEASE ORAL DAILY
Qty: 30 TABLET | Refills: 2 | Status: SHIPPED | OUTPATIENT
Start: 2021-10-05 | End: 2021-10-28 | Stop reason: SDUPTHER

## 2021-10-05 RX ORDER — BUSPIRONE HYDROCHLORIDE 5 MG/1
5 TABLET ORAL
Qty: 30 TABLET | Refills: 0 | Status: SHIPPED | OUTPATIENT
Start: 2021-10-05 | End: 2022-02-08 | Stop reason: SDUPTHER

## 2021-10-05 NOTE — PROGRESS NOTES
Chief Complaint   Patient presents with    Medication Management     1. Have you been to the ER, urgent care clinic since your last visit? Hospitalized since your last visit? no    2. Have you seen or consulted any other health care providers outside of the [address] [place] [address] since your last visit? No    Abuse Screening Questionnaire 10/5/2021   Do you ever feel afraid of your partner? N   Are you in a relationship with someone who physically or mentally threatens you? N   Is it safe for you to go home? Y     Abuse Screening Questionnaire 10/5/2021   Do you ever feel afraid of your partner? N   Are you in a relationship with someone who physically or mentally threatens you? N   Is it safe for you to go home?  Y     3 most recent PHQ Screens 8/6/2021   Little interest or pleasure in doing things Not at all   Feeling down, depressed, irritable, or hopeless Not at all   Total Score PHQ 2 0

## 2021-10-06 RX ORDER — GRISEOFULVIN 500 MG/1
500 TABLET ORAL DAILY
Qty: 42 TABLET | Refills: 0 | OUTPATIENT
Start: 2021-10-06 | End: 2021-11-17

## 2021-10-06 RX ORDER — LEVOTHYROXINE SODIUM 75 UG/1
75 TABLET ORAL
Qty: 90 TABLET | Refills: 1 | Status: SHIPPED | OUTPATIENT
Start: 2021-10-06 | End: 2022-07-21 | Stop reason: SDUPTHER

## 2021-10-06 RX ORDER — ERGOCALCIFEROL 1.25 MG/1
50000 CAPSULE ORAL
Qty: 12 CAPSULE | Refills: 0 | Status: ON HOLD | OUTPATIENT
Start: 2021-10-06 | End: 2022-03-18

## 2021-10-07 ENCOUNTER — TELEPHONE (OUTPATIENT)
Dept: FAMILY MEDICINE CLINIC | Age: 61
End: 2021-10-07

## 2021-10-07 NOTE — TELEPHONE ENCOUNTER
----- Message from Cherelle Quiroga sent at 10/7/2021 12:28 PM EDT -----  Regarding: NP Lester/Telephone  Contact: [phone]  General Message/Vendor Calls    Caller's first and last name: N/A      Reason for call: \"buspirone, shingles\"      Callback required yes/no and why: Yes      Best contact number(s): [phone]      Details to clarify the request: Patient stated the \"buspirone\" prescribed is doing great job, its doing its job. \" Patient would also like to know \"since she's had the shingles how long can she go to get the shingles shot in the future? Lastly patient stated \"PCP was working for name and number for allergist, and she would like to know if is there any appointment available at this time. \"      Cherelle Quiroga

## 2021-10-08 NOTE — TELEPHONE ENCOUNTER
The messages below were set in error please disregard. Hi Lorna-   Can you check and see if this is the correct chart to associate with your message? J    Message text     Chauncey Baumann MD  You 52 minutes ago (7:18 AM)   TATE Cool   Hoping you have called Dr Bam Hunter or one of the oncologists about this one. Is she symptomatic? Message text     Tabby Stokes MD 9 hours ago (10:42 PM)   DL     I am on call tonight. I received a call  received form [facility]. Critical value .1  . Confirmed- No blastocytes   Sw  heavy on the lymphocytes. Concern  for leukemia.  /blood cancer.       Kind Regards,  Faizan CARREROC

## 2021-10-08 NOTE — TELEPHONE ENCOUNTER
I am on call tonight. I received a call  received form [facility]. Critical value .1  . Confirmed- No blastocytes   Sw  heavy on the lymphocytes. Concern  for leukemia.  /blood cancer.      Kind Regards,  Елена CARREROC

## 2021-10-10 NOTE — PROGRESS NOTES
Naila Herring is a 59 y.o. female, evaluated via audio-only technology on 10/5/2021 for Medication Management  . Feeling more in control with divorce and other life stresses. We discussed going back on Pristique and starting  Buspar for break through anxiety. Assessment & Plan:   Diagnoses and all orders for this visit:    1. MDD (major depressive disorder), recurrent severe, without psychosis ([facility] [address]    2. Anxiety    Other orders  -     busPIRone (BUSPAR) 5 mg tablet; Take 1 Tablet by mouth two (2) times daily as needed for Other (anxiety). -     desvenlafaxine succinate (PRISTIQ) 25 mg ER tablet; Take 1 Tablet by mouth daily. The complexity of medical decision making for this visit is moderate         12  Subjective:       Prior to Admission medications    Medication Sig Start Date End Date Taking? Authorizing Provider   busPIRone (BUSPAR) 5 mg tablet Take 1 Tablet by mouth two (2) times daily as needed for Other (anxiety). 10/5/21  Yes Teetee Rios NP   desvenlafaxine succinate (PRISTIQ) 25 mg ER tablet Take 1 Tablet by mouth daily. 10/5/21  Yes Teetee Rios NP   lisdexamfetamine (Vyvanse) 50 mg cap Take 1 Capsule by mouth daily. Max Daily Amount: 50 mg. 9/24/21  Yes Teetee Rios NP   omeprazole (PRILOSEC) 40 mg capsule TAKE 1 CAPSULE BY MOUTH EVERY DAY 8/6/21  Yes Tree Phipps NP   valACYclovir (VALTREX) 500 mg tablet TAKE 1 TAB BY MOUTH TWO (2) TIMES A DAY. INDICATIONS: PREVENTION OF SHINGLES RECURRENCE 4/14/21  Yes Good, Tara R, DO   lidocaine 4 % spra 5-10 Sprays by Apply Externally route three (3) times daily as needed for Pain (itching on scalp). Indications: itching, skin irritation 11/6/20  Yes Good, Tara R, DO   lidocaine (XYLOCAINE) 4 % topical cream Apply  to affected area two (2) times daily as needed for Pain. 9/25/20  Yes Good, Tara R, DO   fluticasone propionate (Flonase Allergy Relief) 50 mcg/actuation nasal spray 2 Sprays by Both Nostrils route daily. Yes Provider, Historical   acetaminophen (Tylenol Arthritis Pain) 650 mg TbER Take 650 mg by mouth every eight (8) hours as needed for Pain. Yes Provider, Historical   ascorbic acid, vitamin C, (Vitamin C) 500 mg tablet Take 1,000 mg by mouth daily. Yes Provider, Historical   CYANOCOBALAMIN, VITAMIN B-12, 5,000 Units by Does Not Apply route daily. Yes Provider, Historical   Synthroid 75 mcg tablet Take 1 Tablet by mouth Daily (before breakfast). 10/6/21   Ishmael Billingsley NP   ergocalciferol (ERGOCALCIFEROL) 1,250 mcg (50,000 unit) capsule Take 1 Capsule by mouth every twenty-eight (28) days for 12 doses. 10/6/21 8/11/22  Ishmael Billingsley NP   hydrOXYzine pamoate (VISTARIL) 25 mg capsule 1-2 capsules PO Q8H PRN anxiety/panic attacks  Patient not taking: Reported on 10/5/2021 8/6/21   Manuel Jewell NP   valACYclovir (VALTREX) 500 mg tablet TAKE 1 TAB BY MOUTH TWO (2) TIMES A DAY. INDICATIONS: PREVENTION OF SHINGLES RECURRENCE 2/19/21   Philip Barksdale DO     Patient Active Problem List   Diagnosis Code    Acute on chronic anemia D64.9    Iron deficiency anemia D50.9    Right ear pain H92.01    Herpes zoster with ophthalmic complication N52.29    Hypothyroid E03.9    Vitamin D deficiency E55.9    MDD (major depressive disorder), recurrent severe, without psychosis ([facility] [address] F33.2    Allergic rhinitis J30.9     Patient Active Problem List    Diagnosis Date Noted    Allergic rhinitis 02/22/2021    Right ear pain 09/24/2020    Herpes zoster with ophthalmic complication 35/98/1895    Iron deficiency anemia 05/05/2020    Acute on chronic anemia 04/29/2020    MDD (major depressive disorder), recurrent severe, without psychosis ([facility] [address] 06/15/2017    Hypothyroid 05/23/2012    Vitamin D deficiency 05/23/2012     Current Outpatient Medications   Medication Sig Dispense Refill    busPIRone (BUSPAR) 5 mg tablet Take 1 Tablet by mouth two (2) times daily as needed for Other (anxiety).  30 Tablet 0    desvenlafaxine succinate (PRISTIQ) 25 mg ER tablet Take 1 Tablet by mouth daily. 30 Tablet 2    lisdexamfetamine (Vyvanse) 50 mg cap Take 1 Capsule by mouth daily. Max Daily Amount: 50 mg. 30 Capsule 0    omeprazole (PRILOSEC) 40 mg capsule TAKE 1 CAPSULE BY MOUTH EVERY DAY 90 Capsule 3    valACYclovir (VALTREX) 500 mg tablet TAKE 1 TAB BY MOUTH TWO (2) TIMES A DAY. INDICATIONS: PREVENTION OF SHINGLES RECURRENCE 120 Tab 0    lidocaine 4 % spra 5-10 Sprays by Apply Externally route three (3) times daily as needed for Pain (itching on scalp). Indications: itching, skin irritation 1 Bottle 1    lidocaine (XYLOCAINE) 4 % topical cream Apply  to affected area two (2) times daily as needed for Pain. 90 g 0    fluticasone propionate (Flonase Allergy Relief) 50 mcg/actuation nasal spray 2 Sprays by Both Nostrils route daily.  acetaminophen (Tylenol Arthritis Pain) 650 mg TbER Take 650 mg by mouth every eight (8) hours as needed for Pain.  ascorbic acid, vitamin C, (Vitamin C) 500 mg tablet Take 1,000 mg by mouth daily.  CYANOCOBALAMIN, VITAMIN B-12, 5,000 Units by Does Not Apply route daily.  Synthroid 75 mcg tablet Take 1 Tablet by mouth Daily (before breakfast). 90 Tablet 1    ergocalciferol (ERGOCALCIFEROL) 1,250 mcg (50,000 unit) capsule Take 1 Capsule by mouth every twenty-eight (28) days for 12 doses. 12 Capsule 0    hydrOXYzine pamoate (VISTARIL) 25 mg capsule 1-2 capsules PO Q8H PRN anxiety/panic attacks (Patient not taking: Reported on 10/5/2021) 30 Capsule 2     Allergies   Allergen Reactions    Macrobid [Nitrofurantoin Monohyd/M-Cryst] Palpitations    Meloxicam Other (comments)    Weed Pollen-Short Ragweed Rash     Past Medical History:   Diagnosis Date    Allergies     Anemia      History reviewed. No pertinent surgical history.   Family History   Problem Relation Age of Onset    Alcohol abuse Father     Dementia Father     Alcohol abuse Brother     Heart Disease Mother open heart surgery with valve replacement    Alcohol abuse Sister     Cancer Maternal Aunt         unknown    Cancer Maternal Uncle 79        prostate    Arthritis-rheumatoid Paternal Aunt     Cancer Paternal Uncle 79        prostate ?  Alcohol abuse Paternal Grandfather      Social History     Tobacco Use    Smoking status: Never Smoker    Smokeless tobacco: Never Used   Substance Use Topics    Alcohol use: Yes     Alcohol/week: 1.0 standard drinks     Types: 1 Shots of liquor per week     Comment: ususally 1 tequile shot per month       ROS  Pertinent items are noted on the HPI  Patient-Reported Vitals 8/24/2021   Patient-Reported Weight 165lb   Patient-Reported LMP -       Kieran Hahn, who was evaluated through a patient-initiated, synchronous (real-time) audio only encounter, and/or her healthcare decision maker, is aware that it is a billable service, with coverage as determined by her insurance carrier. She provided verbal consent to proceed: Yes. She has not had a related appointment within my department in the past 7 days or scheduled within the next 24 hours. On this date 10/05/2021 I have spent 30 minutes reviewing previous notes, test results and face to face (virtual) with the patient discussing the diagnosis and importance of compliance with the treatment plan as well as documenting on the day of the visit.     Ale Ackerman, NP

## 2021-10-13 ENCOUNTER — OFFICE VISIT (OUTPATIENT)
Dept: FAMILY MEDICINE CLINIC | Age: 61
End: 2021-10-13
Payer: MEDICAID

## 2021-10-13 ENCOUNTER — TELEPHONE (OUTPATIENT)
Dept: FAMILY MEDICINE CLINIC | Age: 61
End: 2021-10-13

## 2021-10-13 VITALS
WEIGHT: 159.2 LBS | DIASTOLIC BLOOD PRESSURE: 62 MMHG | SYSTOLIC BLOOD PRESSURE: 100 MMHG | TEMPERATURE: 97.8 F | OXYGEN SATURATION: 97 % | HEART RATE: 96 BPM | RESPIRATION RATE: 16 BRPM | BODY MASS INDEX: 24.99 KG/M2 | HEIGHT: 67 IN

## 2021-10-13 DIAGNOSIS — L50.9 URTICARIA: ICD-10-CM

## 2021-10-13 DIAGNOSIS — L02.811 ABSCESS, SCALP: Primary | ICD-10-CM

## 2021-10-13 PROCEDURE — 99213 OFFICE O/P EST LOW 20 MIN: CPT | Performed by: NURSE PRACTITIONER

## 2021-10-13 RX ORDER — CLINDAMYCIN HYDROCHLORIDE 300 MG/1
300 CAPSULE ORAL 3 TIMES DAILY
Qty: 30 CAPSULE | Refills: 0 | Status: SHIPPED | OUTPATIENT
Start: 2021-10-13 | End: 2021-10-23

## 2021-10-13 RX ORDER — CLINDAMYCIN PHOSPHATE 11.9 MG/ML
SOLUTION TOPICAL
Qty: 60 ML | Refills: 1 | Status: SHIPPED | OUTPATIENT
Start: 2021-10-13 | End: 2021-10-23 | Stop reason: SDUPTHER

## 2021-10-13 RX ORDER — CEPHALEXIN 500 MG/1
500 CAPSULE ORAL 2 TIMES DAILY
Qty: 20 CAPSULE | Refills: 0 | Status: SHIPPED | OUTPATIENT
Start: 2021-10-13 | End: 2021-10-13 | Stop reason: ALTCHOICE

## 2021-10-13 NOTE — PROGRESS NOTES
Chief Complaint   Patient presents with    Skin Problem     scalp started on Monday     Visit Vitals  /62 (BP 1 Location: Left arm, BP Patient Position: Sitting, BP Cuff Size: Adult)   Pulse 96   Temp 97.8 °F (36.6 °C) (Temporal)   Resp 16   Ht 5' 7\" (1.702 m)   Wt 159 lb 3.2 oz (72.2 kg)   SpO2 97%   BMI 24.93 kg/m²     3 most recent PHQ Screens 10/13/2021   Little interest or pleasure in doing things Not at all   Feeling down, depressed, irritable, or hopeless Not at all   Total Score PHQ 2 0       1. Have you been to the ER, urgent care clinic since your last visit? Hospitalized since your last visit? No    2. Have you seen or consulted any other health care providers outside of the [address] [place] [address] since your last visit? Include any pap smears or colon screening.  Yes mammo and pap

## 2021-10-14 NOTE — PROGRESS NOTES
Subjective:      Chief Complaint   Patient presents with    Skin Problem     scalp started on Monday   comedone/abscess opened and drained by patient . Hx of folliculitis   Anxiety in the process     Kieran Rojas is a 59 y.o. female who presents today for a scalp problem. She has a several irritation and severe itching  which is located on the posterior  scalp. She is scratching at her scalp frequently . Current symptoms: erythema: mild. No exudate   Interventions to date: start on antibiotics today  We discussed if tarry abscess presents on scalp come to office for excision, drainage and culture of wound. Objective:     Visit Vitals  /62 (BP 1 Location: Left arm, BP Patient Position: Sitting, BP Cuff Size: Adult)   Pulse 96   Temp 97.8 °F (36.6 °C) (Temporal)   Resp 16   Ht 5' 7\" (1.702 m)   Wt 159 lb 3.2 oz (72.2 kg)   SpO2 97%   BMI 24.93 kg/m²       Wound:   wound margins intact and healing well. No signs of infection. Assessment:   1. Urticaria  Vistaril  PO short term  Keep scalp clean and dry  Clindamycin 1 % solution as prescribed. Orders Placed This Encounter    DISCONTD: cephALEXin (KEFLEX) 500 mg capsule     Sig: Take 1 Capsule by mouth two (2) times a day for 10 days. Dispense:  20 Capsule     Refill:  0    clindamycin (CLEOCIN) 300 mg capsule     Sig: Take 1 Capsule by mouth three (3) times daily for 10 days. Dispense:  30 Capsule     Refill:  0    DISCONTD: clindamycin (CLEOCIN T) 1 % external solution     Sig: use thin film on affected area scalp twice a day as needed. Dispense:  60 mL     Refill:  1    clindamycin (CLEOCIN T) 1 % external solution     Sig: use thin film on affected area scalp twice a day as needed. Indications: severe uticaria     Dispense:  60 mL     Refill:  1       2. Abscess, scalp   Healing   Wound check. Interventions today visual inspection  Clindamycin po and topical prescribed . Plan:     1.  Discussed appropriate home care of this wound. , Antibiotics per orders. 2. Patient instructions were given.   3. Follow up: niki FISHER

## 2021-10-23 ENCOUNTER — HOSPITAL ENCOUNTER (EMERGENCY)
Age: 61
Discharge: HOME | End: 2021-10-23
Attending: EMERGENCY MEDICINE | Admitting: EMERGENCY MEDICINE
Payer: MEDICAID

## 2021-10-23 VITALS
WEIGHT: 155 LBS | OXYGEN SATURATION: 98 % | BODY MASS INDEX: 24.33 KG/M2 | TEMPERATURE: 98.1 F | HEART RATE: 82 BPM | SYSTOLIC BLOOD PRESSURE: 149 MMHG | DIASTOLIC BLOOD PRESSURE: 95 MMHG | HEIGHT: 67 IN | RESPIRATION RATE: 18 BRPM

## 2021-10-23 DIAGNOSIS — L98.491 SKIN ULCER OF SCALP, LIMITED TO BREAKDOWN OF SKIN (HCC): Primary | ICD-10-CM

## 2021-10-23 PROCEDURE — 99283 EMERGENCY DEPT VISIT LOW MDM: CPT

## 2021-10-23 PROCEDURE — 87205 SMEAR GRAM STAIN: CPT

## 2021-10-23 RX ORDER — CLINDAMYCIN PHOSPHATE 11.9 MG/ML
SOLUTION TOPICAL
Qty: 60 ML | Refills: 1 | Status: SHIPPED | OUTPATIENT
Start: 2021-10-23 | End: 2021-11-05 | Stop reason: ALTCHOICE

## 2021-10-23 NOTE — ED TRIAGE NOTES
Pt comes to ED with small area of induration in scalp, crusted scab. Pt reports multiple x 2 months. Itching, painful. Has been on Clindamycin per PCP. Ambulatory to room without difficulty.

## 2021-10-23 NOTE — ED PROVIDER NOTES
[facility]  EMERGENCY DEPARTMENT HISTORY AND PHYSICAL EXAM         Date of Service: 10/23/2021   Patient Name: Rodriguez Weinstein   YOB: 1960  Medical Record Number: [id number]    History of Presenting Illness     Chief Complaint   Patient presents with    Skin Problem        History Provided By:  patient    Additional History:   Rodriguez Weinstein is a 59 y.o. female who presents ambulatory to the ED with cc of painful nodules on right and central parietal scalp. She has had these nodules form for the past 4 years; she has seen several dermatologists who have not found a cause. She saw her PCP last week, who started her on clindamycin PO as well as topical. She states she scraped one of the lesions with her nail a week ago and some thick brown-black material extruded. No F/C. There are no other complaints, changes or physical findings at this time. Primary Care Provider: Evita Blanca DO   Specialist:    Past History     Past Medical History:   Past Medical History:   Diagnosis Date    Allergies     Anemia     Urticaria         Past Surgical History:   No past surgical history on file. Family History:   Family History   Problem Relation Age of Onset    Alcohol abuse Father     Dementia Father     Alcohol abuse Brother     Heart Disease Mother         open heart surgery with valve replacement    Alcohol abuse Sister     Cancer Maternal Aunt         unknown    Cancer Maternal Uncle 79        prostate    Arthritis-rheumatoid Paternal Aunt     Cancer Paternal Uncle 79        prostate ?  Alcohol abuse Paternal Grandfather         Social History:   Social History     Tobacco Use    Smoking status: Never Smoker    Smokeless tobacco: Never Used   Vaping Use    Vaping Use: Never used   Substance Use Topics    Alcohol use:  Yes     Alcohol/week: 1.0 standard drinks     Types: 1 Shots of liquor per week     Comment: ususally 1 tequile shot per month    Drug use: Never        Allergies: Allergies   Allergen Reactions    Macrobid [Nitrofurantoin Monohyd/M-Cryst] Palpitations    Meloxicam Other (comments)    Weed Pollen-Short Ragweed Rash        Review of Systems   Review of Systems   Constitutional: Negative for appetite change, chills and fever. HENT: Negative for congestion. Eyes: Negative for visual disturbance. Respiratory: Negative for cough, shortness of breath and wheezing. Cardiovascular: Negative for chest pain, palpitations and leg swelling. Gastrointestinal: Negative for abdominal pain. Genitourinary: Negative for dysuria, frequency and urgency. Musculoskeletal: Negative for back pain, joint swelling, myalgias and neck stiffness. Skin: Positive for wound. Negative for rash. Neurological: Negative for dizziness, syncope, weakness and headaches. Hematological: Negative for adenopathy. Psychiatric/Behavioral: Negative for behavioral problems and dysphoric mood. Physical Exam  Physical Exam  Vitals and nursing note reviewed. Constitutional:       General: She is not in acute distress. Appearance: She is well-developed. HENT:      Head: Normocephalic and atraumatic. Eyes:      General: No scleral icterus. Conjunctiva/sclera: Conjunctivae normal.      Pupils: Pupils are equal, round, and reactive to light. Cardiovascular:      Rate and Rhythm: Normal rate and regular rhythm. Heart sounds: No murmur heard. No gallop. Pulmonary:      Effort: Pulmonary effort is normal. No respiratory distress. Breath sounds: No stridor. No wheezing or rales. Abdominal:      General: Bowel sounds are normal. There is no distension. Palpations: Abdomen is soft. There is no mass. Tenderness: There is no abdominal tenderness. There is no guarding or rebound. Musculoskeletal:         General: Normal range of motion. Cervical back: Normal range of motion and neck supple.    Lymphadenopathy: Cervical: No cervical adenopathy. Skin:     General: Skin is warm and dry. Findings: No erythema or rash. Comments: Posterior scalp: 2 small (1 cm) ulcerated/excoriated lesions, no drainage, mildly TTP, with no significant fluctuance and no induration. Neurological:      Mental Status: She is alert and oriented to person, place, and time. Cranial Nerves: No cranial nerve deficit. Coordination: Coordination normal.         Medical Decision Making   I am the first provider for this patient. I reviewed the vital signs, available nursing notes, past medical history, past surgical history, family history and social history. Old Medical Records: PCP notes     Provider Notes:   DDX: abscess, folliculitis, psychogenic     ED Course:  5:05 PM   Initial assessment performed. The patients presenting problems have been discussed, and they are in agreement with the care plan formulated and outlined with them. I have encouraged them to ask questions as they arise throughout their visit. Progress Notes:  5:32 PM  Attempted to aspirate the largest lesion, but no appreciable fluid obtained. Culture sent from the wound itself and the bloody discharge. F/U Dermatology. Jodie Amato MD      Procedures:   Procedures    Diagnostic Study Results   Labs -    No results found for this or any previous visit (from the past 12 hour(s)). Radiologic Studies -  The following have been ordered and reviewed:  No orders to display     CT Results  (Last 48 hours)    None        CXR Results  (Last 48 hours)    None            Vital Signs-Reviewed the patient's vital signs. Patient Vitals for the past 12 hrs:   Temp Pulse Resp BP SpO2   10/23/21 1704 -- -- -- -- 98 %   10/23/21 1656 98.1 °F (36.7 °C) 82 18 (!) 149/95 98 %       Medications Given in the ED:  Medications - No data to display    Diagnosis:  Clinical Impression:   1.  Skin ulcer of scalp, limited to breakdown of skin ([facility] [address]         Plan:  1: Follow-up Information     Follow up With Specialties Details Why Contact Info    Emelina Orozco NP Nurse Practitioner In 2 days  Bishnu Pardo  [employer] [employer] [address] [phone]            2:   Current Discharge Medication List        Return to ED if worse. Disposition:  Home  _______________________________   Attestations: This note was performed by Delmis Skinner MD in its entirety.   _______________________________

## 2021-10-24 RX ORDER — SULFAMETHOXAZOLE AND TRIMETHOPRIM 800; 160 MG/1; MG/1
1 TABLET ORAL 2 TIMES DAILY
Qty: 20 TABLET | Refills: 0 | Status: SHIPPED | OUTPATIENT
Start: 2021-10-24 | End: 2022-01-12 | Stop reason: SDUPTHER

## 2021-10-25 LAB
BACTERIA SPEC CULT: NORMAL
GRAM STN SPEC: NORMAL
GRAM STN SPEC: NORMAL
SERVICE CMNT-IMP: NORMAL

## 2021-10-28 ENCOUNTER — VIRTUAL VISIT (OUTPATIENT)
Dept: FAMILY MEDICINE CLINIC | Age: 61
End: 2021-10-28
Payer: MEDICAID

## 2021-10-28 DIAGNOSIS — L50.9 URTICARIA: Primary | ICD-10-CM

## 2021-10-28 PROCEDURE — 99442 PR PHYS/QHP TELEPHONE EVALUATION 11-20 MIN: CPT | Performed by: NURSE PRACTITIONER

## 2021-10-28 RX ORDER — DESVENLAFAXINE 25 MG/1
25 TABLET, EXTENDED RELEASE ORAL DAILY
Qty: 30 TABLET | Refills: 2 | Status: SHIPPED | OUTPATIENT
Start: 2021-10-28 | End: 2021-11-02 | Stop reason: SDUPTHER

## 2021-10-28 NOTE — PROGRESS NOTES
Chief Complaint   Patient presents with    Skin Problem     hives possible 3 places size of dime     1. Have you been to the ER, urgent care clinic since your last visit? Hospitalized since your last visit? no    2. Have you seen or consulted any other health care providers outside of the [address] [place] [address] since your last visit? Include any pap smears or colon screening? No   Abuse Screening Questionnaire 10/28/2021   Do you ever feel afraid of your partner? N   Are you in a relationship with someone who physically or mentally threatens you? N   Is it safe for you to go home?  Y     3 most recent PHQ Screens 10/23/2021   Little interest or pleasure in doing things Not at all   Feeling down, depressed, irritable, or hopeless Not at all   Total Score PHQ 2 0

## 2021-10-31 NOTE — PROGRESS NOTES
Jose Cornell is a 59 y.o. female, evaluated via audio-only technology on 10/28/2021 for Skin Problem (hives possible 3 places size of dime)  . Stop Bactrim  We discussed changing to Cetaphil cleanser     Assessment & Plan:   Diagnoses and all orders for this visit:    1. Urticaria      The complexity of medical decision making for this visit is moderate         12  Subjective:       Prior to Admission medications    Medication Sig Start Date End Date Taking? Authorizing Provider   desvenlafaxine succinate (PRISTIQ) 25 mg ER tablet Take 1 Tablet by mouth daily. 10/28/21   Sudhir Olivier NP   trimethoprim-sulfamethoxazole (BACTRIM DS, SEPTRA DS) 160-800 mg per tablet Take 1 Tablet by mouth two (2) times a day for 10 days. 10/24/21 11/3/21  Sudhir Olivier NP   clindamycin (CLEOCIN T) 1 % external solution use thin film on affected area scalp twice a day as needed. Indications: severe uticaria  Patient not taking: Reported on 10/28/2021 10/23/21   Sudhir Olivier NP   Synthroid 75 mcg tablet Take 1 Tablet by mouth Daily (before breakfast). Patient not taking: Reported on 10/28/2021 10/6/21   Sudhir Olivier NP   ergocalciferol (ERGOCALCIFEROL) 1,250 mcg (50,000 unit) capsule Take 1 Capsule by mouth every twenty-eight (28) days for 12 doses. Patient not taking: Reported on 10/28/2021 10/6/21 8/11/22  Sudhir Olivier NP   busPIRone (BUSPAR) 5 mg tablet Take 1 Tablet by mouth two (2) times daily as needed for Other (anxiety). Patient not taking: Reported on 10/28/2021 10/5/21   Sudhir Olivier NP   lisdexamfetamine (Vyvanse) 50 mg cap Take 1 Capsule by mouth daily. Max Daily Amount: 50 mg.   Patient not taking: Reported on 10/28/2021 9/24/21   Sudhir Olivier NP   omeprazole (PRILOSEC) 40 mg capsule TAKE 1 CAPSULE BY MOUTH EVERY DAY  Patient not taking: Reported on 10/28/2021 8/6/21   Rachel Vidales NP   hydrOXYzine pamoate (VISTARIL) 25 mg capsule 1-2 capsules PO Q8H PRN anxiety/panic attacks  Patient not taking: Reported on 10/28/2021 8/6/21   Benny Pizarro, LOWELL   valACYclovir (VALTREX) 500 mg tablet TAKE 1 TAB BY MOUTH TWO (2) TIMES A DAY. INDICATIONS: PREVENTION OF SHINGLES RECURRENCE  Patient not taking: Reported on 10/28/2021 4/14/21   Roberth Coreas DO   valACYclovir (VALTREX) 500 mg tablet TAKE 1 TAB BY MOUTH TWO (2) TIMES A DAY. INDICATIONS: PREVENTION OF SHINGLES RECURRENCE 2/19/21   Tara Coreas, DO   lidocaine 4 % spra 5-10 Sprays by Apply Externally route three (3) times daily as needed for Pain (itching on scalp). Indications: itching, skin irritation  Patient not taking: Reported on 10/28/2021 11/6/20   Migdalia Romero R, DO   lidocaine (XYLOCAINE) 4 % topical cream Apply  to affected area two (2) times daily as needed for Pain. Patient not taking: Reported on 10/28/2021 9/25/20   Migdalia MACK, DO   fluticasone propionate (Flonase Allergy Relief) 50 mcg/actuation nasal spray 2 Sprays by Both Nostrils route daily. Patient not taking: Reported on 10/28/2021    Provider, Historical   acetaminophen (Tylenol Arthritis Pain) 650 mg TbER Take 650 mg by mouth every eight (8) hours as needed for Pain. Patient not taking: Reported on 10/28/2021    Provider, Historical   ascorbic acid, vitamin C, (Vitamin C) 500 mg tablet Take 1,000 mg by mouth daily. Patient not taking: Reported on 10/28/2021    Provider, Historical   CYANOCOBALAMIN, VITAMIN B-12, 5,000 Units by Does Not Apply route daily.   Patient not taking: Reported on 10/28/2021    Provider, Historical     Patient Active Problem List   Diagnosis Code    Acute on chronic anemia D64.9    Iron deficiency anemia D50.9    Right ear pain H92.01    Herpes zoster with ophthalmic complication T41.27    Hypothyroid E03.9    Vitamin D deficiency E55.9    MDD (major depressive disorder), recurrent severe, without psychosis ([facility] F33.2    Allergic rhinitis J30.9     Patient Active Problem List    Diagnosis Date Noted    Allergic rhinitis 02/22/2021    Right ear pain 09/24/2020    Herpes zoster with ophthalmic complication 64/88/3060    Iron deficiency anemia 05/05/2020    Acute on chronic anemia 04/29/2020    MDD (major depressive disorder), recurrent severe, without psychosis ([facility] 06/15/2017    Hypothyroid 05/23/2012    Vitamin D deficiency 05/23/2012     Current Outpatient Medications   Medication Sig Dispense Refill    desvenlafaxine succinate (PRISTIQ) 25 mg ER tablet Take 1 Tablet by mouth daily. 30 Tablet 2    trimethoprim-sulfamethoxazole (BACTRIM DS, SEPTRA DS) 160-800 mg per tablet Take 1 Tablet by mouth two (2) times a day for 10 days. 20 Tablet 0    clindamycin (CLEOCIN T) 1 % external solution use thin film on affected area scalp twice a day as needed. Indications: severe uticaria (Patient not taking: Reported on 10/28/2021) 60 mL 1    Synthroid 75 mcg tablet Take 1 Tablet by mouth Daily (before breakfast). (Patient not taking: Reported on 10/28/2021) 90 Tablet 1    ergocalciferol (ERGOCALCIFEROL) 1,250 mcg (50,000 unit) capsule Take 1 Capsule by mouth every twenty-eight (28) days for 12 doses. (Patient not taking: Reported on 10/28/2021) 12 Capsule 0    busPIRone (BUSPAR) 5 mg tablet Take 1 Tablet by mouth two (2) times daily as needed for Other (anxiety). (Patient not taking: Reported on 10/28/2021) 30 Tablet 0    lisdexamfetamine (Vyvanse) 50 mg cap Take 1 Capsule by mouth daily. Max Daily Amount: 50 mg. (Patient not taking: Reported on 10/28/2021) 30 Capsule 0    omeprazole (PRILOSEC) 40 mg capsule TAKE 1 CAPSULE BY MOUTH EVERY DAY (Patient not taking: Reported on 10/28/2021) 90 Capsule 3    hydrOXYzine pamoate (VISTARIL) 25 mg capsule 1-2 capsules PO Q8H PRN anxiety/panic attacks (Patient not taking: Reported on 10/28/2021) 30 Capsule 2    valACYclovir (VALTREX) 500 mg tablet TAKE 1 TAB BY MOUTH TWO (2) TIMES A DAY.  INDICATIONS: PREVENTION OF SHINGLES RECURRENCE (Patient not taking: Reported on 10/28/2021) 120 Tab 0    lidocaine 4 % spra 5-10 Sprays by Apply Externally route three (3) times daily as needed for Pain (itching on scalp). Indications: itching, skin irritation (Patient not taking: Reported on 10/28/2021) 1 Bottle 1    lidocaine (XYLOCAINE) 4 % topical cream Apply  to affected area two (2) times daily as needed for Pain. (Patient not taking: Reported on 10/28/2021) 90 g 0    fluticasone propionate (Flonase Allergy Relief) 50 mcg/actuation nasal spray 2 Sprays by Both Nostrils route daily. (Patient not taking: Reported on 10/28/2021)      acetaminophen (Tylenol Arthritis Pain) 650 mg TbER Take 650 mg by mouth every eight (8) hours as needed for Pain. (Patient not taking: Reported on 10/28/2021)      ascorbic acid, vitamin C, (Vitamin C) 500 mg tablet Take 1,000 mg by mouth daily. (Patient not taking: Reported on 10/28/2021)      CYANOCOBALAMIN, VITAMIN B-12, 5,000 Units by Does Not Apply route daily. (Patient not taking: Reported on 10/28/2021)       Allergies   Allergen Reactions    Macrobid [Nitrofurantoin Monohyd/M-Cryst] Palpitations    Meloxicam Other (comments)    Weed Pollen-Short Ragweed Rash     Past Medical History:   Diagnosis Date    Allergies     Anemia     Urticaria      History reviewed. No pertinent surgical history. Family History   Problem Relation Age of Onset    Alcohol abuse Father     Dementia Father     Alcohol abuse Brother     Heart Disease Mother         open heart surgery with valve replacement    Alcohol abuse Sister     Cancer Maternal Aunt         unknown    Cancer Maternal Uncle 79        prostate    Arthritis-rheumatoid Paternal Aunt     Cancer Paternal Uncle 79        prostate ?     Alcohol abuse Paternal Grandfather        ROS  Pertinent items are noted on the HPI  Patient-Reported Vitals 8/24/2021   Patient-Reported Weight 165lb   Patient-Reported LMP -       Kieran Hahn, who was evaluated through a patient-initiated, synchronous (real-time) audio only encounter, and/or her healthcare decision maker, is aware that it is a billable service, with coverage as determined by her insurance carrier. She provided verbal consent to proceed: Yes. She has not had a related appointment within my department in the past 7 days or scheduled within the next 24 hours. On this date 10/28/2021 I have spent 15 minutes reviewing previous notes, test results and face to face (virtual) with the patient discussing the diagnosis and importance of compliance with the treatment plan as well as documenting on the day of the visit.     Hever Rosa, LOWELL

## 2021-11-02 ENCOUNTER — TELEPHONE (OUTPATIENT)
Dept: FAMILY MEDICINE CLINIC | Age: 61
End: 2021-11-02

## 2021-11-02 RX ORDER — DESVENLAFAXINE 25 MG/1
25 TABLET, EXTENDED RELEASE ORAL DAILY
Qty: 90 TABLET | Refills: 1 | Status: SHIPPED | OUTPATIENT
Start: 2021-11-02

## 2021-11-05 ENCOUNTER — TELEPHONE (OUTPATIENT)
Dept: FAMILY MEDICINE CLINIC | Age: 61
End: 2021-11-05

## 2021-11-05 DIAGNOSIS — F90.9 ATTENTION DEFICIT HYPERACTIVITY DISORDER (ADHD), UNSPECIFIED ADHD TYPE: ICD-10-CM

## 2021-11-05 RX ORDER — CLINDAMYCIN PHOSPHATE 10 MG/G
AEROSOL, FOAM TOPICAL DAILY
Qty: 50 G | Refills: 0 | Status: SHIPPED | OUTPATIENT
Start: 2021-11-05 | End: 2021-11-11 | Stop reason: CLARIF

## 2021-11-05 NOTE — TELEPHONE ENCOUNTER
----- Message from Berl Cranker sent at 11/5/2021 11:57 AM EDT -----  Subject: Refill Request    QUESTIONS  Name of Medication? lisdexamfetamine (Vyvanse) 50 mg cap  Patient-reported dosage and instructions? Take 1 Capsule by mouth daily. Max Daily Amount? 50 mg. How many days do you have left? 1  Preferred Pharmacy? [facility] #[id number]  Pharmacy phone number (if available)? [phone]  ---------------------------------------------------------------------------  --------------  CALL BACK INFO  What is the best way for the office to contact you? OK to leave message on   voicemail  Preferred Call Back Phone Number?  [phone]

## 2021-11-05 NOTE — TELEPHONE ENCOUNTER
----- Message from Kian Peace sent at 11/2/2021  1:49 PM EDT -----  Subject: Message to Provider    QUESTIONS  Information for Provider? pt trying to get the topical antibiotic from   pharmacy that was prescribed last week but states a prior auth needs   completed   ---------------------------------------------------------------------------  --------------  CALL BACK INFO  What is the best way for the office to contact you? OK to leave message on   voicemail  Preferred Call Back Phone Number? [phone]  ---------------------------------------------------------------------------  --------------  SCRIPT ANSWERS  Relationship to Patient?  Self
Could you call in a replacement ?
Lizeth : prior authorization has been denied per cover my med's . [place] Case: [id number], Status: Denied. Notification: Completed. Can you recommend something else ?
Ok thanks .
Sorry it did not send a list of approved med's may marquise have to send one and see what happens ? Darby Mera
Topical med since authorization was denied.
Which medication needs to be replaced per Socorro's note? Thanks!  DL
negative...

## 2021-11-05 NOTE — TELEPHONE ENCOUNTER
----- Message from Richard Rdz sent at 11/5/2021 12:00 PM EDT -----  Subject: Medication Problem    QUESTIONS  Name of Medication? clindamycin (CLEOCIN T) 1 % topical foam  Patient-reported dosage and instructions? Apply to affected area daily. What question or problem do you have with the medication? Pt. states the   medication is at [facility] but there is no prior authorization. A message was   sent on 11/2 regarding this problem. Pt. called today to wanting to know   if the prior authorization has been approved. Preferred Pharmacy? [facility]/PH[facility]ACY #[id number] [place] [address] [facility] phone number (if available)? [phone]  Additional Information for Provider?   ---------------------------------------------------------------------------  --------------  [address]  What is the best way for the office to contact you? OK to leave message on   voicemail  Preferred Call Back Phone Number? [phone]  ---------------------------------------------------------------------------  --------------  SCRIPT ANSWERS  Relationship to Patient?  Self

## 2021-11-11 DIAGNOSIS — F90.9 ATTENTION DEFICIT HYPERACTIVITY DISORDER (ADHD), UNSPECIFIED ADHD TYPE: ICD-10-CM

## 2021-11-11 RX ORDER — CLINDAMYCIN PHOSPHATE 11.9 MG/ML
SOLUTION TOPICAL
Qty: 30 ML | Refills: 3 | Status: SHIPPED | OUTPATIENT
Start: 2021-11-11 | End: 2021-12-01 | Stop reason: SDUPTHER

## 2021-11-26 ENCOUNTER — TELEPHONE (OUTPATIENT)
Dept: FAMILY MEDICINE CLINIC | Age: 61
End: 2021-11-26

## 2021-11-26 NOTE — TELEPHONE ENCOUNTER
----- Message from Sari Mcmanus sent at 11/26/2021 10:40 AM EST -----  Subject: Message to Provider    QUESTIONS  Information for Provider? patient call and stated that their right ear has   been closed since 11/10/2021 and they can not hear out of it. Patient sees   Dr. Mariaelena Abad  ---------------------------------------------------------------------------  --------------  [address]  What is the best way for the office to contact you? OK to leave message on   voicemail  Preferred Call Back Phone Number? [phone]  ---------------------------------------------------------------------------  --------------  SCRIPT ANSWERS  Relationship to Patient?  Self

## 2021-11-26 NOTE — TELEPHONE ENCOUNTER
Ear full feeling, no drainage, no pain/fevers. Transferred to schedule first avail appt. Advised if no better or worse go to urgent care.

## 2021-12-01 ENCOUNTER — OFFICE VISIT (OUTPATIENT)
Dept: FAMILY MEDICINE CLINIC | Age: 61
End: 2021-12-01
Payer: MEDICAID

## 2021-12-01 VITALS
DIASTOLIC BLOOD PRESSURE: 70 MMHG | OXYGEN SATURATION: 98 % | WEIGHT: 164 LBS | SYSTOLIC BLOOD PRESSURE: 120 MMHG | HEIGHT: 67 IN | RESPIRATION RATE: 18 BRPM | HEART RATE: 84 BPM | TEMPERATURE: 97.3 F | BODY MASS INDEX: 25.74 KG/M2

## 2021-12-01 DIAGNOSIS — H61.23 BILATERAL IMPACTED CERUMEN: Primary | ICD-10-CM

## 2021-12-01 DIAGNOSIS — T78.40XD HYPERSENSITIVITY REACTION, SUBSEQUENT ENCOUNTER: ICD-10-CM

## 2021-12-01 DIAGNOSIS — Z12.11 SCREENING FOR COLON CANCER: ICD-10-CM

## 2021-12-01 PROCEDURE — 69209 REMOVE IMPACTED EAR WAX UNI: CPT | Performed by: NURSE PRACTITIONER

## 2021-12-01 PROCEDURE — 99213 OFFICE O/P EST LOW 20 MIN: CPT | Performed by: NURSE PRACTITIONER

## 2021-12-01 RX ORDER — CLINDAMYCIN PHOSPHATE 11.9 MG/ML
SOLUTION TOPICAL
Qty: 6 ML | Refills: 3 | Status: SHIPPED | OUTPATIENT
Start: 2021-12-01 | End: 2022-01-19

## 2021-12-01 NOTE — PROGRESS NOTES
1. Have you been to the ER, urgent care clinic since your last visit? Hospitalized since your last visit? No    2. Have you seen or consulted any other health care providers outside of the [address] [place] [address] since your last visit? Include any pap smears or colon screening.  No      Chief Complaint   Patient presents with    Ear Drainage     right         Visit Vitals  /70 (BP 1 Location: Right upper arm, BP Patient Position: Sitting, BP Cuff Size: Adult)   Pulse 84   Temp 97.3 °F (36.3 °C) (Tympanic)   Resp 18   Ht 5' 7\" (1.702 m)   Wt 164 lb (74.4 kg)   SpO2 98%   BMI 25.69 kg/m²       Pain Scale: 0 - No pain/10  Pain Location:

## 2021-12-05 NOTE — PROGRESS NOTES
Subjective:     David Grey is a 59 y.o. female who presents today with the following:  Chief Complaint   Patient presents with    Ear Drainage     right       Patient Active Problem List   Diagnosis Code    Acute on chronic anemia D64.9    Iron deficiency anemia D50.9    Right ear pain H92.01    Herpes zoster with ophthalmic complication G93.10    Hypothyroid E03.9    Vitamin D deficiency E55.9    MD[facility] (major [facility]), recurrent severe, [facility] F33.2    Allergic rhinitis J30.9         COMPLIANT WITH MEDICATION:    Denies chest pain, dyspnea, palpitations, headache and blurred vision. Blood pressure normotensive. Hx of head stuffiness /ears clogged. She tried nasal rinse and dramamine with relief. Ears still clogged. She tried wax remover at home not effective. depression screening addressed not at risk    abuse screening addressed denies    learning assessment addressed reviewed nurses notes    fall risk addressed not at risk    HM: addressed    ROS:  Gen: denies fever, chills, fatigue, weight loss, weight gain  HEENT:denies blurry vision, nasal congestion, sore throat  Resp: denies dypsnea, cough, wheezing  CV: denies chest pain radiating to the jaws or arms, palpitations, lower extremity edema  Abd: denies nausea, vomiting, diarrhea, constipation  Neuro: denies numbness/tingling  Endo: denies polyuria, polydipsia, heat/cold intolerance  Heme: no lymphadenopathy    Allergies   Allergen Reactions    Macrobid [Nitrofurantoin Monohyd/M-Cryst] Palpitations    Meloxicam Other (comments)    Weed Pollen-Short Ragweed Rash         Current Outpatient Medications:     clindamycin (CLEOCIN T) 1 % external solution, use thin film on affected area, Disp: 6 mL, Rfl: 3    lisdexamfetamine (Vyvanse) 50 mg cap, Take 1 Capsule by mouth daily. Max Daily Amount: 50 mg.  Indications: attention deficit disorder with hyperactivity, Disp: 30 Capsule, Rfl: 0    desvenlafaxine succinate (PRISTIQ) 25 mg ER tablet, Take 1 Tablet by mouth daily. , Disp: 90 Tablet, Rfl: 1    Synthroid 75 mcg tablet, Take 1 Tablet by mouth Daily (before breakfast). , Disp: 90 Tablet, Rfl: 1    ergocalciferol (ERGOCALCIFEROL) 1,250 mcg (50,000 unit) capsule, Take 1 Capsule by mouth every twenty-eight (28) days for 12 doses. , Disp: 12 Capsule, Rfl: 0    busPIRone (BUSPAR) 5 mg tablet, Take 1 Tablet by mouth two (2) times daily as needed for Other (anxiety). , Disp: 30 Tablet, Rfl: 0    omeprazole (PRILOSEC) 40 mg capsule, TAKE 1 CAPSULE BY MOUTH EVERY DAY, Disp: 90 Capsule, Rfl: 3    fluticasone propionate (Flonase Allergy Relief) 50 mcg/actuation nasal spray, 2 Sprays by Both Nostrils route daily. , Disp: , Rfl:     ascorbic acid, vitamin C, (Vitamin C) 500 mg tablet, Take 1,000 mg by mouth daily. , Disp: , Rfl:     CYANOCOBALAMIN, VITAMIN B-12,, 5,000 Units by Does Not Apply route daily. , Disp: , Rfl:     Past Medical History:   Diagnosis Date    Allergies     Anemia     Urticaria        History reviewed. No pertinent surgical history. Social History     Tobacco Use   Smoking Status Never Smoker   Smokeless Tobacco Never Used       Social History     Socioeconomic History    Marital status: LEGALLY     Number of children: 2    Years of education: 16   Tobacco Use    Smoking status: Never Smoker    Smokeless tobacco: Never Used   Vaping Use    Vaping Use: Never used   Substance and Sexual Activity    Alcohol use:  Yes     Alcohol/week: 1.0 standard drink     Types: 1 Shots of liquor per week     Comment: ususally 1 tequile shot per month    Drug use: Never   Other Topics Concern     Service No    Blood Transfusions No    Caffeine Concern No    Occupational Exposure No    Hobby Hazards No    Sleep Concern No    Stress Concern No    Weight Concern No    Special Diet No    Back Care No    Exercise No    Bike Helmet No    Seat Belt Yes    Self-Exams Yes       Family History   Problem Relation Age of Onset    Alcohol abuse Father     Dementia Father     Alcohol abuse Brother     Heart Disease Mother         open heart surgery with valve replacement    Alcohol abuse Sister     Cancer Maternal Aunt         unknown    Cancer Maternal Uncle 79        prostate    Arthritis-rheumatoid Paternal Aunt     Cancer Paternal Uncle 79        prostate ?  Alcohol abuse Paternal Grandfather          Objective:     Visit Vitals  /70 (BP 1 Location: Right upper arm, BP Patient Position: Sitting, BP Cuff Size: Adult)   Pulse 84   Temp 97.3 °F (36.3 °C) (Tympanic)   Resp 18   Ht 5' 7\" (1.702 m)   Wt 164 lb (74.4 kg)   SpO2 98%   BMI 25.69 kg/m²     Body mass index is 25.69 kg/m². General: Alert and oriented. No acute distress. Well nourished  HEENT :  Ears:Cerumen impaction after irrigation without instrumentation TMs are normal. Canals are 90%  clear. Eyes: pupils equal, round, react to light and accommodation. Extra ocular movements intact. Nose: patent. Mouth and throat is clear. Neck:supple full range of motion no thyromegaly. Trachea midline, No carotid bruits. No significant lymphadenopathy  Lungs[de-identified] clear to auscultation without wheezes, rales, or rhonchi.  eart :RRR, S1 & S2 are normal intensity. No murmur; no gallop  Extremities: without clubbing, cyanosis, or edema  Pulses: radial and femoral pulses are normal  Neuro: HMF intact. Cranial nerves II through XII grossly normal.l  Psych:appropriate behavior, mood, affect and judgement. Results for orders placed or performed during the hospital encounter of 10/23/21   CULTURE, WOUND W GRAM STAIN    Specimen: Scalp; Wound   Result Value Ref Range    Special Requests: NO SPECIAL REQUESTS      GRAM STAIN RARE WBCS SEEN      GRAM STAIN NO ORGANISMS SEEN      Culture result: LIGHT MIXED SKIN MODOY ISOLATED         No results found for this visit on 12/01/21. Assessment/ Plan:     1.  Bilateral impacted cerumen    - REMOVAL IMPACTED CERUMEN IRRIGATION/LVG UNILAT      Orders Placed This Encounter    REMOVAL IMPACTED CERUMEN IRRIGATION/LVG UNILAT    clindamycin (CLEOCIN T) 1 % external solution     Sig: use thin film on affected area     Dispense:  6 mL     Refill:  3     Please adjust quantity per insurance if needed. Verbal and written instructions (see AVS) provided. Patient expresses understanding of diagnosis and treatment plan.     Health Maintenance Due   Topic Date Due    Hepatitis C Screening  Never done    COVID-19 Vaccine (1) Never done    Cervical cancer screen  Never done    Colorectal Cancer Screening Combo  Never done    Shingrix Vaccine Age 50> (1 of 2) Never done    Breast Cancer Screen Mammogram  Never done               RODNEY ThorntonP-C

## 2021-12-14 ENCOUNTER — TELEPHONE (OUTPATIENT)
Dept: FAMILY MEDICINE CLINIC | Age: 61
End: 2021-12-14

## 2021-12-14 NOTE — TELEPHONE ENCOUNTER
----- Message from Nir Stallings sent at 12/14/2021  1:18 PM EST -----  Subject: Message to Provider    QUESTIONS  Information for Provider? Patient wanted to know if the Dr ever found the   code that stated weather or not she has pneumonia antibodies. Patient also   wanted to know if the Dr could Lacnish Roachon a Colonoscopy and Endoscopy and   have it listed a a routine check up. Patient's Medicaid was renewed and   wanted to make sure she got it done before it runs out.  ---------------------------------------------------------------------------  --------------  CALL BACK INFO  What is the best way for the office to contact you? OK to leave message on   voicemail  Preferred Call Back Phone Number? [phone]  ---------------------------------------------------------------------------  --------------  SCRIPT ANSWERS  Relationship to Patient?  Self

## 2021-12-14 NOTE — TELEPHONE ENCOUNTER
----- Message from Desiree Buckley sent at 12/14/2021  1:18 PM EST -----  Subject: Message to Provider    QUESTIONS  Information for Provider? Patient wanted to know if the Dr ever found the   code that stated weather or not she has pneumonia antibodies. Patient also   wanted to know if the Dr could [place] a Colonoscopy and Endoscopy and   have it listed a a routine check up. Patient's Medicaid was renewed and   wanted to make sure she got it done before it runs out.  ---------------------------------------------------------------------------  --------------  CALL BACK INFO  What is the best way for the office to contact you? OK to leave message on   voicemail  Preferred Call Back Phone Number? [phone]  ---------------------------------------------------------------------------  --------------  SCRIPT ANSWERS  Relationship to Patient?  Self

## 2021-12-15 ENCOUNTER — CLINICAL SUPPORT (OUTPATIENT)
Dept: FAMILY MEDICINE CLINIC | Age: 61
End: 2021-12-15
Payer: MEDICAID

## 2021-12-15 ENCOUNTER — TELEPHONE (OUTPATIENT)
Dept: FAMILY MEDICINE CLINIC | Age: 61
End: 2021-12-15

## 2021-12-15 VITALS — TEMPERATURE: 97.5 F

## 2021-12-15 DIAGNOSIS — T78.40XD HYPERSENSITIVITY REACTION, SUBSEQUENT ENCOUNTER: ICD-10-CM

## 2021-12-15 DIAGNOSIS — F90.9 ATTENTION DEFICIT HYPERACTIVITY DISORDER (ADHD), UNSPECIFIED ADHD TYPE: ICD-10-CM

## 2021-12-15 PROCEDURE — 36415 COLL VENOUS BLD VENIPUNCTURE: CPT | Performed by: NURSE PRACTITIONER

## 2021-12-15 NOTE — TELEPHONE ENCOUNTER
This patient called and stated that she is up for a prescription renewal of her Lisdexamfetamine (Vyvanse) 50 mg Cap. She uses the [facility] in [place] as her pharmacy.      Thank you

## 2021-12-15 NOTE — TELEPHONE ENCOUNTER
Wants a blood test to get to see if she has antibodies to see if she needs the 2nd pneumonia shot. She says its time sensitive.  She will malke an appointment to set up referrals for colonoscopy

## 2021-12-15 NOTE — PROGRESS NOTES
Patient presented to the office for lab BW via right arm venipuncture without any complications, 1 gold SST tube sent.

## 2021-12-23 LAB
PNEUMO AB TYPE 1*, 828957: 3.1 UG/ML
PNEUMO AB TYPE 12 (12F)*, 828974: 0.2 UG/ML
PNEUMO AB TYPE 14*, 828975: 1.4 UG/ML
PNEUMO AB TYPE 19 (19F)*, 828976: 1.1 UG/ML
PNEUMO AB TYPE 23 (23F)*, 828977: 0.2 UG/ML
PNEUMO AB TYPE 3*, 828958: 5.8 UG/ML
PNEUMO AB TYPE 4*, 828959: 2.3 UG/ML
PNEUMO AB TYPE 8*, 828960: 12.3 UG/ML
PNEUMO AB TYPE 9 (9N)*, 828961: 0.7 UG/ML
S PNEUM DA 18C IGG SER IA-MCNC: 0.2 UG/ML
S PNEUM DA 19A IGG SER IA-MCNC: 4.9 UG/ML
S PNEUM DA 6B IGG SER IA-MCNC: 0.7 UG/ML
S PNEUM DA 7F IGG SER IA-MCNC: 1.8 UG/ML
S PNEUM DA 9V IGG SER IA-MCNC: 2 UG/ML

## 2021-12-23 NOTE — PROGRESS NOTES
Please call, some of her pneumonia antibodies are low. She can come in and get the Pneumovax 23 vaccine to boost her levels and re-check her antibodies 6 weeks after the immunization. This vaccine may or may not be covered by her insurance.  I encourage her to check with them first.

## 2021-12-27 ENCOUNTER — TELEPHONE (OUTPATIENT)
Dept: FAMILY MEDICINE CLINIC | Age: 61
End: 2021-12-27

## 2021-12-27 NOTE — TELEPHONE ENCOUNTER
Patient verified stating name and date of birth. Patient informed of lab results and states understanding.  She also wanted you to review wound culture she had done  at [facility] she said this can wait until next week , not urgent

## 2021-12-27 NOTE — TELEPHONE ENCOUNTER
----- Message from [facility] sent at 12/23/2021  3:41 PM EST -----  Subject: Message to Provider    QUESTIONS  Information for Provider? Patient called in, returning a call to [address] [place] [address],   at 2:50pm. Attempted to transfer the call and heard someone  then   hang up. Attempted repeatedly over the next 45 mins to get through and was   unable. Patient then disconnected from the call. Please call them back at   your earliest convenience. Thank you  ---------------------------------------------------------------------------  --------------  CALL BACK INFO  What is the best way for the office to contact you? OK to leave message on   voicemail  Preferred Call Back Phone Number? [phone]  ---------------------------------------------------------------------------  --------------  SCRIPT ANSWERS  Relationship to Patient?  Self

## 2022-01-06 ENCOUNTER — TELEPHONE (OUTPATIENT)
Dept: FAMILY MEDICINE CLINIC | Age: 62
End: 2022-01-06

## 2022-01-06 NOTE — TELEPHONE ENCOUNTER
----- Message from Britney Aden sent at 1/6/2022  9:39 AM EST -----  Subject: Message to Provider    QUESTIONS  Information for Provider? Patient requesting 2nd Pneumonia vaccine today -   patient of EFRAÍN Solano  ---------------------------------------------------------------------------  --------------  [employer] INFO  What is the best way for the office to contact you? OK to leave message on   voicemail  Preferred Call Back Phone Number? [phone]  ---------------------------------------------------------------------------  --------------  SCRIPT ANSWERS  Relationship to Patient?  Self

## 2022-01-07 ENCOUNTER — TELEPHONE (OUTPATIENT)
Dept: FAMILY MEDICINE CLINIC | Age: 62
End: 2022-01-07

## 2022-01-07 NOTE — TELEPHONE ENCOUNTER
Pt had a P23 in Feb 2021, she stopped by the office today. She is very concerned about dying from pneumonia and really wants to get another dose. Will insurance pay for that or will it most likely be an out-of-pocket charge? Can she have a P13? I don't see in her immunization record that she's had one. Do you think insurance will cover that?     Thanks,  Monique Stevenson

## 2022-01-07 NOTE — TELEPHONE ENCOUNTER
----- Message from Jayce Edsonsumit sent at 1/7/2022 10:52 AM EST -----  Subject: Message to Provider    QUESTIONS  Information for Provider? Patient is following up on her message that was   sent yesterday. she would like to the the pneumonia shot today if someone   could call her back and let her know if its okay to do so or not.   ---------------------------------------------------------------------------  --------------  CALL BACK INFO  What is the best way for the office to contact you? OK to leave message on   voicemail  Preferred Call Back Phone Number? [phone]  ---------------------------------------------------------------------------  --------------  SCRIPT ANSWERS  Relationship to Patient?  Self

## 2022-01-10 ENCOUNTER — TELEPHONE (OUTPATIENT)
Dept: FAMILY MEDICINE CLINIC | Age: 62
End: 2022-01-10

## 2022-01-10 NOTE — TELEPHONE ENCOUNTER
Called and spoke with patient at length regarding the recommendation for the pneumonia vaccine. Conversation ended with the patient is going to call her insurance and see if they will cover it. If not, she is going to see if the immunologist will consider writing a letter of medical necessity. MILAGROS Washington NP sent to Theo Bermeo  Caller: Unspecified (4 days ago, 12:13 PM)  She is approved to have it early as long as her insurance will cover it . Mervat Solo that is important to her . Salazar Keith may want to contact her insurance company. Thanks!    DL

## 2022-01-10 NOTE — TELEPHONE ENCOUNTER
Pt is requesting a referral ([facility]) for allergy to start the process for allergy injections. (She reports that she didn't go back in Sept). Also, she wants another referral to General Surgery in [facility] to set up her routine colonoscopy.      Thanks,   Faviola Cruz

## 2022-01-11 ENCOUNTER — TELEPHONE (OUTPATIENT)
Dept: FAMILY MEDICINE CLINIC | Age: 62
End: 2022-01-11

## 2022-01-11 NOTE — TELEPHONE ENCOUNTER
----- Message from Jasmyne Lynette sent at 1/11/2022  9:39 AM EST -----  Subject: Message to Provider    QUESTIONS  Information for Provider? Message for David Polker? Insurance company covers a   pneumonia shot; patient also needs code to cover colonoscopy and endoscopy   for insurance company to cover   ---------------------------------------------------------------------------  --------------  [address]  What is the best way for the office to contact you? OK to leave message on   voicemail  Preferred Call Back Phone Number? [phone]  ---------------------------------------------------------------------------  --------------  SCRIPT ANSWERS  Relationship to Patient?  Self

## 2022-01-12 ENCOUNTER — OFFICE VISIT (OUTPATIENT)
Dept: FAMILY MEDICINE CLINIC | Age: 62
End: 2022-01-12
Payer: MEDICAID

## 2022-01-12 VITALS
BODY MASS INDEX: 26.68 KG/M2 | DIASTOLIC BLOOD PRESSURE: 80 MMHG | HEART RATE: 85 BPM | TEMPERATURE: 97 F | RESPIRATION RATE: 18 BRPM | HEIGHT: 67 IN | SYSTOLIC BLOOD PRESSURE: 130 MMHG | OXYGEN SATURATION: 99 % | WEIGHT: 170 LBS

## 2022-01-12 DIAGNOSIS — L98.9 SCALP LESION: Primary | ICD-10-CM

## 2022-01-12 PROCEDURE — 11305 SHAVE SKIN LESION 0.5 CM/<: CPT | Performed by: NURSE PRACTITIONER

## 2022-01-12 PROCEDURE — 99213 OFFICE O/P EST LOW 20 MIN: CPT | Performed by: NURSE PRACTITIONER

## 2022-01-12 RX ORDER — SULFAMETHOXAZOLE AND TRIMETHOPRIM 800; 160 MG/1; MG/1
1 TABLET ORAL 2 TIMES DAILY
Qty: 20 TABLET | Refills: 0 | Status: SHIPPED | OUTPATIENT
Start: 2022-01-12 | End: 2022-01-17

## 2022-01-12 NOTE — PROGRESS NOTES
Subjective:     Kieran Marie is a 59 y.o. female who presents today with the following:  No chief complaint on file. Patient Active Problem List   Diagnosis Code    Acute on chronic anemia D64.9    Iron deficiency anemia D50.9    Right ear pain H92.01    Herpes zoster with ophthalmic complication Z92.09    Hypothyroid E03.9    Vitamin D deficiency E55.9    MDD (major depressive disorder), recurrent severe, without psychosis ([facility] F33.2    Allergic rhinitis J30.9     HPI: Patient is a 59year old female who presents today for evaluation of a scalp lesion. This lesion has been present for one week, though patient endorses getting these types of lesions frequently. She has tried clindamycin and bactrim with some relief. She endorses no aggravating factors. She would like to send a sample of a lesion to a lab to possible find out an etiology. COMPLIANT WITH MEDICATION:   HTN; Denies chest pain, dyspnea, palpitations, headache and blurred vision. Blood pressure normotensive. depression screening addressed not at risk    abuse screening addressed denies    learning assessment addressed reviewed nurses notes    fall risk addressed not at risk    HM: addressed-Patient states she will get her pneumonia vaccine at a later date.     ROS:  Gen: denies fever, chills, fatigue, weight loss, weight gain  HEENT:denies blurry vision, nasal congestion, sore throat  Resp: denies dypsnea, cough, wheezing  CV: denies chest pain radiating to the jaws or arms, palpitations, lower extremity edema  Abd: denies nausea, vomiting, diarrhea, constipation  Neuro: denies numbness/tingling  Endo: denies polyuria, polydipsia, heat/cold intolerance  Heme: no lymphadenopathy    Allergies   Allergen Reactions    Macrobid [Nitrofurantoin Monohyd/M-Cryst] Palpitations    Meloxicam Other (comments)    Weed Pollen-Short Ragweed Rash         Current Outpatient Medications:     lisdexamfetamine (Vyvanse) 50 mg cap, Take 1 Capsule by mouth daily. Max Daily Amount: 50 mg. Indications: attention deficit disorder with hyperactivity, Disp: 30 Capsule, Rfl: 0    clindamycin (CLEOCIN T) 1 % external solution, use thin film on affected area, Disp: 6 mL, Rfl: 3    desvenlafaxine succinate (PRISTIQ) 25 mg ER tablet, Take 1 Tablet by mouth daily. , Disp: 90 Tablet, Rfl: 1    Synthroid 75 mcg tablet, Take 1 Tablet by mouth Daily (before breakfast). , Disp: 90 Tablet, Rfl: 1    ergocalciferol (ERGOCALCIFEROL) 1,250 mcg (50,000 unit) capsule, Take 1 Capsule by mouth every twenty-eight (28) days for 12 doses. , Disp: 12 Capsule, Rfl: 0    busPIRone (BUSPAR) 5 mg tablet, Take 1 Tablet by mouth two (2) times daily as needed for Other (anxiety). , Disp: 30 Tablet, Rfl: 0    omeprazole (PRILOSEC) 40 mg capsule, TAKE 1 CAPSULE BY MOUTH EVERY DAY, Disp: 90 Capsule, Rfl: 3    fluticasone propionate (Flonase Allergy Relief) 50 mcg/actuation nasal spray, 2 Sprays by Both Nostrils route daily. , Disp: , Rfl:     ascorbic acid, vitamin C, (Vitamin C) 500 mg tablet, Take 1,000 mg by mouth daily. , Disp: , Rfl:     CYANOCOBALAMIN, VITAMIN B-12,, 5,000 Units by Does Not Apply route daily. , Disp: , Rfl:     Past Medical History:   Diagnosis Date    Allergies     Anemia     Urticaria        History reviewed. No pertinent surgical history. Social History     Tobacco Use   Smoking Status Never Smoker   Smokeless Tobacco Never Used       Social History     Socioeconomic History    Marital status: LEGALLY     Number of children: 2    Years of education: 16   Tobacco Use    Smoking status: Never Smoker    Smokeless tobacco: Never Used   Vaping Use    Vaping Use: Never used   Substance and Sexual Activity    Alcohol use:  Yes     Alcohol/week: 1.0 standard drink     Types: 1 Shots of liquor per week     Comment: ususally 1 tequile shot per month    Drug use: Never   Other Topics Concern     Service No    Blood Transfusions No    Caffeine Concern No    Occupational Exposure No    Hobby Hazards No    Sleep Concern No    Stress Concern No    Weight Concern No    Special Diet No    Back Care No    Exercise No    Bike Helmet No    Seat Belt Yes    Self-Exams Yes       Family History   Problem Relation Age of Onset    Alcohol abuse Father     Dementia Father     Alcohol abuse Brother     Heart Disease Mother         open heart surgery with valve replacement    Alcohol abuse Sister     Cancer Maternal Aunt         unknown    Cancer Maternal Uncle 79        prostate    Arthritis-rheumatoid Paternal Aunt     Cancer Paternal Uncle 79        prostate ?  Alcohol abuse Paternal Grandfather          Objective:     Visit Vitals  /80 (BP 1 Location: Right upper arm, BP Patient Position: Sitting, BP Cuff Size: Adult)   Pulse 85   Temp 97 °F (36.1 °C) (Temporal)   Resp 18   Ht 5' 7\" (1.702 m)   Wt 170 lb (77.1 kg)   SpO2 99%   BMI 26.63 kg/m²     Body mass index is 26.63 kg/m². General: Alert and oriented. No acute distress. Well nourished  HEENT : Multiple lesions on scalp with various stages of healing. Areas of alopecia noted where old lesions have healed  Ears:TMs are normal. Canals are clear. Eyes: pupils equal, round, react to light and accommodation. Extra ocular movements intact. Nose: patent. Mouth and throat is clear. MASK  Neck:supple full range of motion no thyromegaly. Trachea midline, No carotid bruits. No significant lymphadenopathy  Lungs[de-identified] clear to auscultation without wheezes, rales, or rhonchi. Heart :RRR, S1 & S2 are normal intensity. No murmur; no gallop  Abdomen: bowel sounds active. No tenderness, guarding, rebound, masses, hepatic or spleen enlargement  Back: no CVA tenderness. Extremities: without clubbing, cyanosis, or edema  Pulses: radial and femoral pulses are normal  Neuro: HMF intact.  Cranial nerves II through XII grossly normal.  Motor: is 5 over 5 and symmetrical.   Deep tendon reflexes: +2 equal  Integumentary; Multiple scalp lesions a few with clear exudate. Psych:appropriate behavior, mood, affect and judgement. Results for orders placed or performed in visit on 12/01/21   PNEUMOCOCCAL IM (14 SEROTYPE)   Result Value Ref Range    PNEUMO AB TYPE 1* 3.1 >1.3 ug/mL    PNEUMO AB TYPE 3* 5.8 >1.3 ug/mL    PNEUMO AB TYPE 4* 2.3 >1.3 ug/mL    PNEUMO AB TYPE 8* 12.3 >1.3 ug/mL    PNEUMO AB TYPE 9 (9N)* 0.7 (L) >1.3 ug/mL    PNEUMO AB TYPE 12 (12F)* 0.2 (L) >1.3 ug/mL    PNEUMO AB TYPE 14* 1.4 >1.3 ug/mL    PNEUMO AB TYPE 19 (19F)* 1.1 (L) >1.3 ug/mL    PNEUMO AB TYPE 23 (23F)* 0.2 (L) >1.3 ug/mL    PNEUMO AB TYPE 26 (6B)* 0.7 (L) >1.3 ug/mL    PNEUMO AB TYPE 51 (7F)* 1.8 >1.3 ug/mL    PNEUMO AB TYPE 56 (18C)* 0.2 (L) >1.3 ug/mL    PNEUMO AB TYPE 57 (19A)* 4.9 >1.3 ug/mL    PNEUMO AB TYPE 68 (9V)* 2.0 >1.3 ug/mL       No results found for this visit on 01/12/22. Assessment/ Plan:     1. Scalp lesion    - SURGICAL PATHOLOGY  - CULTURE, BODY FLUID W GRAM STAIN; Future  - CULTURE, BODY FLUID W GRAM STAIN      Orders Placed This Encounter    CULTURE, BODY FLUID W GRAM STAIN     Standing Status:   Future     Number of Occurrences:   1     Standing Expiration Date:   2/12/2022     Scheduling Instructions:      Scalp midline  Lesion 8 mm approximate size     Order Specific Question:   Tube Number:     Answer:   1    SURGICAL PATHOLOGY     Order Specific Question:   Type of Specimen and Locations? Answer:   midline scalp lesion     Order Specific Question:   Patient's clinical history:     Answer:   repetitive scalp lesion  puritic       Patient will follow up in three months or sooner if needed. Verbal and written instructions (see AVS) provided. Patient expresses understanding of diagnosis and treatment plan.     Health Maintenance Due   Topic Date Due    Hepatitis C Screening  Never done    COVID-19 Vaccine (1) Never done    Cervical cancer screen  Never done    Colorectal Cancer Screening Combo Never done    Shingrix Vaccine Age 50> (1 of 2) Never done    Breast Cancer Screen Mammogram  Never done               DEVIN Wolff-CONOR                                                        Subjective:    Kieran Flores is a 59 y.o. female who presents for lesion removal. We have discussed this procedure, including option of not performing surgery, technique of surgery and potential for scarring at an earlier visit. Oubjective:   Patient appears well.    Visit Vitals  /80 (BP 1 Location: Right upper arm, BP Patient Position: Sitting, BP Cuff Size: Adult)   Pulse 85   Temp 97 °F (36.1 °C) (Temporal)   Resp 18   Ht 5' 7\" (1.702 m)   Wt 170 lb (77.1 kg)   SpO2 99%   BMI 26.63 kg/m²     Skin: Multiple scalp lesion posterior portin of the scalp    Assessment:   suspicious lesion      [facility] AT [address] [place] [address]  OFFICE PROCEDURE PROGRESS NOTE        Chart reviewed for the following:   Rosy VASQUEZ NP, have reviewed the History, Physical and updated the Allergic reactions for Kieran Hahn     TIME OUT performed immediately prior to start of procedure:   Rosy VASQUEZ NP, have performed the following reviews on Kieran Hahn prior to the start of the procedure:            * Patient was identified by name and date of birth   * Agreement on procedure being performed was verified  * Risks and Benefits explained to the patient  * Procedure site verified and marked as necessary  * Patient was positioned for comfort  * Consent was signed and verified     Time: 3:20 PM      Date of procedure: 1/12/2022    Procedure performed by:  Rosy Foote NP    Provider assisted by: Carlos Mendoza RN    Patient assisted by: self    How tolerated by patient: tolerated the procedure well with no complications    Post Procedural Pain Scale: 0 - No Hurt    Comments: none          Procedure Note:   After informed consent was obtained, using chlorhexidine for cleansing and 1% lidocaine with epinephrine for anesthetic, with sterile technique, shave excision was performed. 2 mm lesion removed. Antibiotic dressing is applied, and wound care instructions provided. Be alert for any signs of cutaneous infection. The procedure was well tolerated without complications. Follow up: the patient may return prn. Aerobic culture sample was obtained for 2nd lesion also on the posterior portion of the scalp.     Mi FISHER

## 2022-01-12 NOTE — PROGRESS NOTES
1. Have you been to the ER, urgent care clinic since your last visit? Hospitalized since your last visit? No    2. Have you seen or consulted any other health care providers outside of the [address] [place] [address] since your last visit? Include any pap smears or colon screening. No      No chief complaint on file.         Visit Vitals  /80 (BP 1 Location: Right upper arm, BP Patient Position: Sitting, BP Cuff Size: Adult)   Pulse 85   Temp 97 °F (36.1 °C) (Temporal)   Resp 18   Ht 5' 7\" (1.702 m)   Wt 170 lb (77.1 kg)   SpO2 99%   BMI 26.63 kg/m²       Pain Scale: 3/10  Pain Location: Head

## 2022-01-15 NOTE — ACP (ADVANCE CARE PLANNING)
Discussed importance of advanced medical directives with patient. Patient is capable of making decisions.  Dorene Lema NP-C

## 2022-01-16 LAB — BACTERIA FLD CULT: ABNORMAL

## 2022-01-17 RX ORDER — VANCOMYCIN HYDROCHLORIDE 250 MG/1
250 CAPSULE ORAL 4 TIMES DAILY
Qty: 40 CAPSULE | Refills: 0 | Status: SHIPPED | OUTPATIENT
Start: 2022-01-17 | End: 2022-02-08 | Stop reason: SDUPTHER

## 2022-01-18 ENCOUNTER — TELEPHONE (OUTPATIENT)
Dept: FAMILY MEDICINE CLINIC | Age: 62
End: 2022-01-18

## 2022-01-18 LAB
CPT CODES, 490044: NORMAL
CPT DISCLAIMER: NORMAL
CYTOLOGY SPEC DOC CYTO: NORMAL
DIAGNOSIS SYNOPSIS:: NORMAL
DX ICD CODE: NORMAL
DX ICD CODE: NORMAL
PATH REPORT.COMMENTS IMP SPEC: NORMAL
PATH REPORT.GROSS SPEC: NORMAL
PATH REPORT.RELEVANT HX SPEC: NORMAL
PATHOLOGIST NAME: NORMAL
SPECIMEN SOURCE: NORMAL

## 2022-01-18 NOTE — TELEPHONE ENCOUNTER
Pt called wanting to know if she can get a refill on they vyvance 50 mg (adha) med, patient also wants to know if there is a prescription for mupirocin (bactroban) if so can this be prescribed. Patient also states that she has talked to surgeon about scheduling a colonoscopy and wants to know if a EGD can be added, and lastly wants to let Faye Solano know that the Vancomycin is working wonderfully.  She uses [facility] and her phone number is [phone]

## 2022-01-19 DIAGNOSIS — F90.9 ATTENTION DEFICIT HYPERACTIVITY DISORDER (ADHD), UNSPECIFIED ADHD TYPE: ICD-10-CM

## 2022-01-19 RX ORDER — BACITRACIN ZINC 500 UNIT/G
OINTMENT (GRAM) TOPICAL 2 TIMES DAILY
Qty: 15 G | Refills: 0 | Status: SHIPPED | OUTPATIENT
Start: 2022-01-19 | End: 2022-06-15 | Stop reason: ALTCHOICE

## 2022-01-20 NOTE — TELEPHONE ENCOUNTER
Refills sent to [facility].  She will need to ask the provider doing the colonoscopy to order the endoscopy. Please call patient to inform. Thanks !  DL

## 2022-01-21 ENCOUNTER — TELEPHONE (OUTPATIENT)
Dept: FAMILY MEDICINE CLINIC | Age: 62
End: 2022-01-21

## 2022-01-21 NOTE — PROGRESS NOTES
Skin biopsy scalp mild psoriasis. Complete antibiotics for MRSA infection first. Ask her to check with her pharmacy for a shampoo for scalp psoriasis.

## 2022-01-26 ENCOUNTER — TELEPHONE (OUTPATIENT)
Dept: SURGERY | Age: 62
End: 2022-01-26

## 2022-01-27 ENCOUNTER — OFFICE VISIT (OUTPATIENT)
Dept: SURGERY | Age: 62
End: 2022-01-27

## 2022-01-27 VITALS
HEART RATE: 69 BPM | DIASTOLIC BLOOD PRESSURE: 89 MMHG | WEIGHT: 170 LBS | SYSTOLIC BLOOD PRESSURE: 138 MMHG | HEIGHT: 67 IN | BODY MASS INDEX: 26.68 KG/M2

## 2022-01-27 DIAGNOSIS — Z12.11 ENCOUNTER FOR SCREENING COLONOSCOPY: Primary | ICD-10-CM

## 2022-01-27 NOTE — PROGRESS NOTES
Subjective:      Kieran Juárez is an 59 y.o. female who is referred for a screening colonoscopy. Patients last scope was over 10 years ago and was normal.  She does have a history of iron deficiency anemia requiring three blood transfusions in mid 2020, however has not required any blood products since that period. Her last CBC was in 6/2020 and was normal.  Her last iron profile was in 6/21 and was also normal.  She denies any blood per rectum and all prior fecal occult test's were negative. She has past symptoms of reflux which are well controlled with omeprazole. She denies any change in bowel habits, stool caliber, or weight loss. She denies any history of IBD or diverticular disease. She also denies any family history of colon ca. HPI     Patient Active Problem List   Diagnosis Code    Acute on chronic anemia D64.9    Iron deficiency anemia D50.9    Right ear pain H92.01    Herpes zoster with ophthalmic complication E77.63    Hypothyroid E03.9    Vitamin D deficiency E55.9    MD[facility]major [facility]), [facility] F33.2    Allergic rhinitis J30.9     Patient Active Problem List    Diagnosis Date Noted    Allergic rhinitis 02/22/2021    Right ear pain 09/24/2020    Herpes zoster with ophthalmic complication 21/61/5402    Iron deficiency anemia 05/05/2020    Acute on chronic anemia 04/29/2020    MDD (major depressive disorder), recurrent severe, without psychosis ([email or web address] 06/15/2017    Hypothyroid 05/23/2012    Vitamin D deficiency 05/23/2012     Current Outpatient Medications   Medication Sig Dispense Refill    lisdexamfetamine (Vyvanse) 50 mg cap Take 1 Capsule by mouth daily. Max Daily Amount: 50 mg. Indications: attention deficit disorder with hyperactivity 30 Capsule 0    vancomycin (VANCOCIN) 250 mg capsule Take 1 Capsule by mouth four (4) times daily for 10 days.  40 Capsule 0    desvenlafaxine succinate (PRISTIQ) 25 mg ER tablet Take 1 Tablet by mouth daily. 90 Tablet 1    Synthroid 75 mcg tablet Take 1 Tablet by mouth Daily (before breakfast). 90 Tablet 1    ergocalciferol (ERGOCALCIFEROL) 1,250 mcg (50,000 unit) capsule Take 1 Capsule by mouth every twenty-eight (28) days for 12 doses. 12 Capsule 0    busPIRone (BUSPAR) 5 mg tablet Take 1 Tablet by mouth two (2) times daily as needed for Other (anxiety). 30 Tablet 0    omeprazole (PRILOSEC) 40 mg capsule TAKE 1 CAPSULE BY MOUTH EVERY DAY 90 Capsule 3    fluticasone propionate (Flonase Allergy Relief) 50 mcg/actuation nasal spray 2 Sprays by Both Nostrils route daily.  ascorbic acid, vitamin C, (Vitamin C) 500 mg tablet Take 1,000 mg by mouth daily.  CYANOCOBALAMIN, VITAMIN B-12, 5,000 Units by Does Not Apply route daily.  bacitracin zinc (BACITRACIN) ointment Apply  to affected area two (2) times a day. (Patient not taking: Reported on 1/27/2022) 15 g 0     Allergies   Allergen Reactions    Macrobid [Nitrofurantoin Monohyd/M-Cryst] Palpitations    Meloxicam Other (comments)    Weed Pollen-Short Ragweed Rash     Past Medical History:   Diagnosis Date    Allergies     Anemia     Urticaria      History reviewed. No pertinent surgical history. Family History   Problem Relation Age of Onset    Alcohol abuse Father     Dementia Father     Alcohol abuse Brother     Heart Disease Mother         open heart surgery with valve replacement    Alcohol abuse Sister     Cancer Maternal Aunt         unknown    Cancer Maternal Uncle 79        prostate    Arthritis-rheumatoid Paternal Aunt     Cancer Paternal Uncle 79        prostate ?  Alcohol abuse Paternal Grandfather      Social History     Tobacco Use    Smoking status: Never Smoker    Smokeless tobacco: Never Used   Substance Use Topics    Alcohol use:  Yes     Alcohol/week: 1.0 standard drink     Types: 1 Shots of liquor per week     Comment: ususally 1 tequile shot per month        Review of Systems  Review of Systems   Constitutional: Negative for chills, fever, malaise/fatigue and weight loss. HENT: Positive for congestion. Negative for sore throat. Respiratory: Negative for cough and shortness of breath. Cardiovascular: Negative for chest pain, palpitations, orthopnea, claudication and leg swelling. Gastrointestinal: Positive for heartburn. Negative for abdominal pain, blood in stool, constipation, diarrhea, nausea and vomiting. Musculoskeletal: Negative for back pain, joint pain and neck pain. Skin: Negative for itching and rash. Neurological: Negative for weakness and headaches. Objective:     Visit Vitals  /89   Pulse 69   Ht 5' 7\" (1.702 m)   Wt 170 lb (77.1 kg)   BMI 26.63 kg/m²     Physical Exam  Constitutional:       General: She is not in acute distress. Appearance: Normal appearance. She is normal weight. She is not ill-appearing or toxic-appearing. HENT:      Head: Normocephalic and atraumatic. Mouth/Throat:      Mouth: Mucous membranes are moist.      Pharynx: Oropharynx is clear. Eyes:      General: No scleral icterus. Cardiovascular:      Rate and Rhythm: Normal rate. Pulmonary:      Effort: Pulmonary effort is normal. No respiratory distress. Breath sounds: No wheezing. Abdominal:      General: There is no distension. Palpations: Abdomen is soft. Tenderness: There is no abdominal tenderness. Musculoskeletal:         General: No swelling. Normal range of motion. Cervical back: Normal range of motion and neck supple. Lymphadenopathy:      Cervical: No cervical adenopathy. Skin:     General: Skin is warm. Coloration: Skin is not jaundiced. Neurological:      General: No focal deficit present. Mental Status: She is alert and oriented to person, place, and time. Assessment/Plan:   59year old requiring screening colonoscopy     Colonoscopy is indicated as noted above and we will proceed to colonoscopy.   The risks( bleeding, perforation, abdominal pain, etc. )  and benefits of my recommendations, as well as other treatment options were discussed with the patient today. Questions were answered. Prep is prescribed per orders. Patient also wishes to have a EGD since she has prior history of anemia and reflux disease. I discussed the details of the procedure including risks (perforation, bleeding, aspiration, etc.). The patient was counseled at length about the risks of priscila Covid-19 during their perioperative period and any recovery window from their procedure. The patient was made aware that priscila Covid-19  may worsen their prognosis for recovering from their procedure and lend to a higher morbidity and/or mortality risk. All material risks, benefits, and reasonable alternatives including postponing the procedure were discussed. The patient does wish to proceed with the procedure at this time.

## 2022-02-08 ENCOUNTER — OFFICE VISIT (OUTPATIENT)
Dept: FAMILY MEDICINE CLINIC | Age: 62
End: 2022-02-08
Payer: MEDICAID

## 2022-02-08 VITALS
HEIGHT: 67 IN | TEMPERATURE: 97.4 F | BODY MASS INDEX: 26.68 KG/M2 | WEIGHT: 170 LBS | RESPIRATION RATE: 16 BRPM | OXYGEN SATURATION: 99 %

## 2022-02-08 DIAGNOSIS — L98.9 SCALP LESION: ICD-10-CM

## 2022-02-08 DIAGNOSIS — Z23 ENCOUNTER FOR IMMUNIZATION: Primary | ICD-10-CM

## 2022-02-08 PROCEDURE — 90732 PPSV23 VACC 2 YRS+ SUBQ/IM: CPT

## 2022-02-08 PROCEDURE — 99213 OFFICE O/P EST LOW 20 MIN: CPT | Performed by: NURSE PRACTITIONER

## 2022-02-08 RX ORDER — VANCOMYCIN HYDROCHLORIDE 250 MG/1
250 CAPSULE ORAL 4 TIMES DAILY
Qty: 40 CAPSULE | Refills: 0 | Status: SHIPPED | OUTPATIENT
Start: 2022-02-08 | End: 2022-04-21 | Stop reason: SDUPTHER

## 2022-02-08 RX ORDER — BUSPIRONE HYDROCHLORIDE 5 MG/1
5 TABLET ORAL
Qty: 60 TABLET | Refills: 2 | Status: SHIPPED | OUTPATIENT
Start: 2022-02-08 | End: 2022-03-02 | Stop reason: SDUPTHER

## 2022-02-08 NOTE — PROGRESS NOTES
Patient was administered Pneumonia vaccine 23  via IM in left deltoid. Patient tolerated medication without side effects noted. Medication information reviewed with patient, patient states understanding. Patient to resume routine medications at home. Patient given copy of AVS with medication information and instructions for home. AVS reviewed with patient and patient states understanding. 1. Have you been to the ER, urgent care clinic since your last visit? Hospitalized since your last visit? No    2. Have you seen or consulted any other health care providers outside of the [address] [place] [address] since your last visit? Include any pap smears or colon screening.  No      Chief Complaint   Patient presents with    Anxiety    Hair/Scalp Problem     f/u         Visit Vitals  Temp 97.4 °F (36.3 °C) (Temporal)   Resp 16   Ht 5' 7\" (1.702 m)   Wt 170 lb (77.1 kg)   SpO2 99%   BMI 26.63 kg/m²       Pain Scale: 0 - No pain/10  Pain Location:

## 2022-02-10 NOTE — PROGRESS NOTES
Subjective:     Peggy Mcguire is a 59 y.o. female who presents today with the following:  Chief Complaint   Patient presents with    Anxiety    Hair/Scalp Problem     f/u       Patient Active Problem List   Diagnosis Code    Acute on chronic anemia D64.9    Iron deficiency anemia D50.9    Right ear pain H92.01    Herpes zoster with ophthalmic complication K19.02    Hypothyroid E03.9    Vitamin D deficiency E55.9    MDD (major depressive disorder), recurrent severe, without psychosis ([facility] [address] F33.2    Allergic rhinitis J30.9         COMPLIANT WITH MEDICATION:    Denies chest pain, dyspnea, palpitations, headache and blurred vision. Blood pressure normotensive. Anxiety going through a divorce MRSA lesions on scalp appear. Ms. Jenny Washington presents for follow up. Lesions improving . Completed course of antibiotics. depression screening addressed not at risk    abuse screening addressed denies    learning assessment addressed reviewed nurses notes    fall risk addressed not at risk    HM: addressed  Pneumonia vaccine checked with antibodies which were low. Repeat pneumonia vaccine t     ROS:  Gen: denies fever, chills, fatigue, weight loss, weight gain  HEENT:denies blurry vision, nasal congestion, sore throat  Resp: denies dypsnea, cough, wheezing  CV: denies chest pain radiating to the jaws or arms, palpitations, lower extremity edema  Abd: denies nausea, vomiting, diarrhea, constipation  Neuro: denies numbness/tingling  Endo: denies polyuria, polydipsia, heat/cold intolerance  Heme: no lymphadenopathy    Allergies   Allergen Reactions    Macrobid [Nitrofurantoin Monohyd/M-Cryst] Palpitations    Meloxicam Other (comments)    Weed Pollen-Short Ragweed Rash         Current Outpatient Medications:     vancomycin (VANCOCIN) 250 mg capsule, Take 1 Capsule by mouth four (4) times daily for 10 days. , Disp: 40 Capsule, Rfl: 0    busPIRone (BUSPAR) 5 mg tablet, Take 1 Tablet by mouth two (2) times daily as needed for PRN Reason (Other) (anxiety). , Disp: 60 Tablet, Rfl: 2    lisdexamfetamine (Vyvanse) 50 mg cap, Take 1 Capsule by mouth daily. Max Daily Amount: 50 mg. Indications: attention deficit disorder with hyperactivity, Disp: 30 Capsule, Rfl: 0    desvenlafaxine succinate (PRISTIQ) 25 mg ER tablet, Take 1 Tablet by mouth daily. , Disp: 90 Tablet, Rfl: 1    Synthroid 75 mcg tablet, Take 1 Tablet by mouth Daily (before breakfast). , Disp: 90 Tablet, Rfl: 1    ergocalciferol (ERGOCALCIFEROL) 1,250 mcg (50,000 unit) capsule, Take 1 Capsule by mouth every twenty-eight (28) days for 12 doses. , Disp: 12 Capsule, Rfl: 0    omeprazole (PRILOSEC) 40 mg capsule, TAKE 1 CAPSULE BY MOUTH EVERY DAY, Disp: 90 Capsule, Rfl: 3    fluticasone propionate (Flonase Allergy Relief) 50 mcg/actuation nasal spray, 2 Sprays by Both Nostrils route daily. , Disp: , Rfl:     ascorbic acid, vitamin C, (Vitamin C) 500 mg tablet, Take 1,000 mg by mouth daily. , Disp: , Rfl:     CYANOCOBALAMIN, VITAMIN B-12,, 5,000 Units by Does Not Apply route daily. , Disp: , Rfl:     bacitracin zinc (BACITRACIN) ointment, Apply  to affected area two (2) times a day. (Patient not taking: Reported on 1/27/2022), Disp: 15 g, Rfl: 0    Past Medical History:   Diagnosis Date    Allergies     Anemia     Urticaria        History reviewed. No pertinent surgical history. Social History     Tobacco Use   Smoking Status Never Smoker   Smokeless Tobacco Never Used       Social History     Socioeconomic History    Marital status: LEGALLY     Number of children: 2    Years of education: 16   Tobacco Use    Smoking status: Never Smoker    Smokeless tobacco: Never Used   Vaping Use    Vaping Use: Never used   Substance and Sexual Activity    Alcohol use:  Yes     Alcohol/week: 1.0 standard drink     Types: 1 Shots of liquor per week     Comment: ususally 1 tequile shot per month    Drug use: Never   Other Topics Concern     Service No    Blood Transfusions No    Caffeine Concern No    Occupational Exposure No    Hobby Hazards No    Sleep Concern No    Stress Concern No    Weight Concern No    Special Diet No    Back Care No    Exercise No    Bike Helmet No    Seat Belt Yes    Self-Exams Yes       Family History   Problem Relation Age of Onset    Alcohol abuse Father     Dementia Father     Alcohol abuse Brother     Heart Disease Mother         open heart surgery with valve replacement    Alcohol abuse Sister     Cancer Maternal Aunt         unknown    Cancer Maternal Uncle 79        prostate    Arthritis-rheumatoid Paternal Aunt     Cancer Paternal Uncle 79        prostate ?  Alcohol abuse Paternal Grandfather          Objective:     Visit Vitals  Temp 97.4 °F (36.3 °C) (Temporal)   Resp 16   Ht 5' 7\" (1.702 m)   Wt 170 lb (77.1 kg)   SpO2 99%   BMI 26.63 kg/m²     Body mass index is 26.63 kg/m². General: Alert and oriented. No acute distress. Well nourished  HEENT : multiple Scalp lesions noted on posterior scalp near part healing. Ears:TMs are normal. Canals are clear. Eyes: pupils equal, round, react to light and accommodation. Extra ocular movements intact. Nose: patent. Mouth and throat is clear. Neck:supple full range of motion no thyromegaly. Trachea midline, No carotid bruits. No significant lymphadenopathy  Lungs[de-identified] clear to auscultation without wheezes, rales, or rhonchi. Heart :RRR, S1 & S2 are normal intensity. No murmur; no gallop  Abdomen: bowel sounds active. No tenderness, guarding, rebound, masses, hepatic or spleen enlargement  Back: no CVA tenderness. Extremities: without clubbing, cyanosis, or edema  Pulses: radial and femoral pulses are normal  Neuro: HMF intact. Cranial nerves II through XII grossly normal.  Motor: is 5 over 5 and symmetrical.   Deep tendon reflexes: +2 equal  Psych:appropriate behavior, mood, affect and judgement.    Results for orders placed or performed in visit on 01/12/22 CULTURE, BODY FLUID W GRAM STAIN    Specimen: Body Fluid   Result Value Ref Range    Body fluid culture (A)      Staphylococcus pseudintermedius  Based on resistance to oxacillin this isolate would be resistant to         Susceptibility    Staphylococcus pseudintermedius -  (no method available)*     Ciprofloxacin ($) R Resistant ug/mL     Clindamycin ($) R Resistant ug/mL     Erythromycin ($$$$) R Resistant ug/mL     Gentamicin ($) R Resistant ug/mL     Levofloxacin ($) R Resistant ug/mL     Moxifloxacin ($$$$) R Resistant ug/mL     Oxacillin R Resistant ug/mL     Penicillin R Resistant ug/mL     Rifampin ($$$$) S Susceptible ug/mL     Tetracycline R Resistant ug/mL     Trimeth-Sulfamethoxa R Resistant ug/mL     Vancomycin ($) S Susceptible ug/mL     * Performed at:  [address]  [id number] Director: Maliha Garrett MD, Phone:  [phone]   SURGICAL PATHOLOGY   Result Value Ref Range    Diagnosis synopsis: Comment     Specimen Comment     Clinical history: Comment     Diagnosis Comment     COMMENT: Comment     Gross Description Comment     Electronically signed by Comment     CPT codes Comment     CPT Disclaimer Comment     Clinician provided ICD L98.9     PATHOLOGIST PROVIDED ICD L85.9        No results found for this visit on 02/08/22. Assessment/ Plan:     1. Encounter for immunization    - PNEUMOCOCCAL POLYSACCHARIDE VACCINE, 23-VALENT, ADULT OR IMMUNOSUPPRESSED PT DOSE,  - ADMIN PNEUMOCOCCAL VACCINE    2. Scalp lesion   Improving repeat course of antibiotics. Orders Placed This Encounter    PNEUMOCOCCAL POLYSACCHARIDE VACCINE, 23-VALENT, ADULT OR IMMUNOSUPPRESSED PT DOSE,    ADMIN PNEUMOCOCCAL VACCINE    vancomycin (VANCOCIN) 250 mg capsule     Sig: Take 1 Capsule by mouth four (4) times daily for 10 days.      Dispense:  40 Capsule     Refill:  0    busPIRone (BUSPAR) 5 mg tablet     Sig: Take 1 Tablet by mouth two (2) times daily as needed for PRN Reason (Other) (anxiety). Dispense:  60 Tablet     Refill:  2         Verbal and written instructions (see AVS) provided. Patient expresses understanding of diagnosis and treatment plan.     Health Maintenance Due   Topic Date Due    Hepatitis C Screening  Never done    COVID-19 Vaccine (1) Never done    Cervical cancer screen  Never done    Colorectal Cancer Screening Combo  Never done    Shingrix Vaccine Age 50> (1 of 2) Never done    Breast Cancer Screen Mammogram  Never done               Autumn Sicard, FNP-C

## 2022-02-10 NOTE — ACP (ADVANCE CARE PLANNING)
Discussed importance of advanced medical directives with patient. Patient is capable of making decisions.  Cris Reyes NP- C

## 2022-03-02 RX ORDER — BUSPIRONE HYDROCHLORIDE 5 MG/1
5 TABLET ORAL
Qty: 60 TABLET | Refills: 2 | Status: SHIPPED | OUTPATIENT
Start: 2022-03-02 | End: 2022-03-07 | Stop reason: SDUPTHER

## 2022-03-07 DIAGNOSIS — F90.9 ATTENTION DEFICIT HYPERACTIVITY DISORDER (ADHD), UNSPECIFIED ADHD TYPE: ICD-10-CM

## 2022-03-07 RX ORDER — FUROSEMIDE 20 MG/1
20 TABLET ORAL
Qty: 90 TABLET | Refills: 1 | Status: SHIPPED | OUTPATIENT
Start: 2022-03-07

## 2022-03-07 RX ORDER — BUSPIRONE HYDROCHLORIDE 5 MG/1
5 TABLET ORAL
Qty: 60 TABLET | Refills: 2 | Status: SHIPPED | OUTPATIENT
Start: 2022-03-07 | End: 2022-03-30 | Stop reason: SDUPTHER

## 2022-03-09 ENCOUNTER — ANESTHESIA EVENT (OUTPATIENT)
Dept: SURGERY | Age: 62
End: 2022-03-09
Payer: MEDICAID

## 2022-03-09 NOTE — ANESTHESIA PREPROCEDURE EVALUATION
Relevant Problems   NEUROLOGY   (+) MDD (major depressive disorder), recurrent severe, without psychosis ([facility] [address]      ENDOCRINE   (+) Hypothyroid      HEMATOLOGY   (+) Acute on chronic anemia   (+) Iron deficiency anemia       Anesthetic History   No history of anesthetic complications            Review of Systems / Medical History  Patient summary reviewed, nursing notes reviewed and pertinent labs reviewed    Pulmonary  Within defined limits                 Neuro/Psych         Psychiatric history (depression)     Cardiovascular  Within defined limits                     GI/Hepatic/Renal     GERD           Endo/Other      Hypothyroidism  Anemia     Other Findings            Physical Exam    Airway  Mallampati: III  TM Distance: < 4 cm  Neck ROM: decreased range of motion   Mouth opening: Normal     Cardiovascular  Regular rate and rhythm,  S1 and S2 normal,  no murmur, click, rub, or gallop             Dental  No notable dental hx       Pulmonary  Breath sounds clear to auscultation               Abdominal  GI exam deferred       Other Findings            Anesthetic Plan    ASA: 2  Anesthesia type: MAC            Anesthetic plan and risks discussed with: Patient

## 2022-03-10 RX ORDER — ACETAMINOPHEN 500 MG/1
1000 CAPSULE, LIQUID FILLED ORAL AS NEEDED
COMMUNITY

## 2022-03-10 NOTE — PERIOP NOTES
[address] [place] [address]  SURGICAL PRE-ADMISSION INSTRUCTIONS    ARRIVAL  · You will be called the day before your surgery with your expected arrival time. · Sign in at the  of the hospital.  You will be directed to the Surgical Waiting Room. · Please arrive at your scheduled appointment time. You have been scheduled to arrive for your procedure one or two hours prior to the expected start time of your procedure. · Every effort will be made to minimize your wait but please be aware that unforeseen circumstances may affect our schedule. EATING  · DO NOT EAT OR DRINK ANYTHING AFTER MIDNIGHT ON THE EVENING BEFORE YOUR SURGERY OR ON THE DAY OF YOUR SURGERY except for your medications (as instructed) with a sip of water. · Do not use gum, mints or lozenges on the morning of your surgery. · Please do not smoke or chew tobacco before your surgery. MEDICATIONS   · Take the following medications on the morning of your surgery with the smallest amount of water possible : NONE    STOP THESE MEDICATIONS AT THE TIMES LISTED BELOW  NONE     DRIVING/TRANSPORATION  · Have a responsible adult to drive you home from the hospital and to stay with you over night. Please have them plan to remain in the hospital during your surgery. Your surgery will not be done if you do not have a responsible adult to take you home and to stay with you. · If you have arranged for public transport, you must have a responsible adult to ride with you (who is not the ). · You may not drive for 24 hours after anesthesia. PREPARATION  · If you have a Living WiIl/Advance Directive, please bring a copy with you to scan into your chart. · Please DO NOT wear makeup or nail polish  · Please leave valuables at home,  DO NOT wear jewelry. · Wear loose, comfortable clothing that is large enough to cover a bulky dressing.     SPECIAL INSTRUCTIONS:  · Follow your surgeon's instructions for preoperative bowel prep.    Reviewed above preoperative instructions and answered questions by phone interview    Patient:  Luis Anuradha   Date:     March 10, 2022  Time:   12:56 PM    RN:  Anson Wolfe RN    Date:     March 10, 2022  Time:   12:56 PM

## 2022-03-14 ENCOUNTER — HOSPITAL ENCOUNTER (OUTPATIENT)
Dept: PREADMISSION TESTING | Age: 62
Discharge: HOME | End: 2022-03-14
Attending: SURGERY
Payer: MEDICAID

## 2022-03-14 PROCEDURE — U0005 INFEC AGEN DETEC AMPLI PROBE: HCPCS

## 2022-03-15 LAB
SARS-COV-2, XPLCVT: NOT DETECTED
SOURCE, COVRS: NORMAL

## 2022-03-18 ENCOUNTER — ANESTHESIA (OUTPATIENT)
Dept: SURGERY | Age: 62
End: 2022-03-18
Payer: MEDICAID

## 2022-03-18 ENCOUNTER — HOSPITAL ENCOUNTER (OUTPATIENT)
Age: 62
Setting detail: OUTPATIENT SURGERY
Discharge: HOME | End: 2022-03-18
Attending: SURGERY | Admitting: SURGERY
Payer: MEDICAID

## 2022-03-18 VITALS
OXYGEN SATURATION: 100 % | SYSTOLIC BLOOD PRESSURE: 122 MMHG | BODY MASS INDEX: 25.9 KG/M2 | RESPIRATION RATE: 12 BRPM | HEART RATE: 66 BPM | TEMPERATURE: 98.7 F | DIASTOLIC BLOOD PRESSURE: 75 MMHG | HEIGHT: 67 IN | WEIGHT: 165 LBS

## 2022-03-18 PROCEDURE — 74011000250 HC RX REV CODE- 250: Performed by: ANESTHESIOLOGY

## 2022-03-18 PROCEDURE — 2709999900 HC NON-CHARGEABLE SUPPLY: Performed by: SURGERY

## 2022-03-18 PROCEDURE — 74011250636 HC RX REV CODE- 250/636: Performed by: ANESTHESIOLOGY

## 2022-03-18 PROCEDURE — 76060000033 HC ANESTHESIA 1 TO 1.5 HR: Performed by: SURGERY

## 2022-03-18 PROCEDURE — 74011250636 HC RX REV CODE- 250/636: Performed by: SURGERY

## 2022-03-18 PROCEDURE — 76210000006 HC OR PH I REC 0.5 TO 1 HR: Performed by: SURGERY

## 2022-03-18 PROCEDURE — 76010000149 HC OR TIME 1 TO 1.5 HR: Performed by: SURGERY

## 2022-03-18 RX ORDER — SODIUM CHLORIDE 0.9 % (FLUSH) 0.9 %
5-40 SYRINGE (ML) INJECTION EVERY 8 HOURS
Status: DISCONTINUED | OUTPATIENT
Start: 2022-03-18 | End: 2022-03-18 | Stop reason: HOSPADM

## 2022-03-18 RX ORDER — SODIUM CHLORIDE 0.9 % (FLUSH) 0.9 %
5-40 SYRINGE (ML) INJECTION AS NEEDED
Status: DISCONTINUED | OUTPATIENT
Start: 2022-03-18 | End: 2022-03-18 | Stop reason: HOSPADM

## 2022-03-18 RX ORDER — MIDAZOLAM HYDROCHLORIDE 1 MG/ML
INJECTION, SOLUTION INTRAMUSCULAR; INTRAVENOUS AS NEEDED
Status: DISCONTINUED | OUTPATIENT
Start: 2022-03-18 | End: 2022-03-18 | Stop reason: HOSPADM

## 2022-03-18 RX ORDER — SODIUM CHLORIDE 0.9 % (FLUSH) 0.9 %
5-40 SYRINGE (ML) INJECTION EVERY 8 HOURS
Status: CANCELLED | OUTPATIENT
Start: 2022-03-18

## 2022-03-18 RX ORDER — SODIUM CHLORIDE, SODIUM LACTATE, POTASSIUM CHLORIDE, CALCIUM CHLORIDE 600; 310; 30; 20 MG/100ML; MG/100ML; MG/100ML; MG/100ML
100 INJECTION, SOLUTION INTRAVENOUS CONTINUOUS
Status: DISCONTINUED | OUTPATIENT
Start: 2022-03-18 | End: 2022-03-18 | Stop reason: HOSPADM

## 2022-03-18 RX ORDER — LIDOCAINE HYDROCHLORIDE 20 MG/ML
INJECTION, SOLUTION EPIDURAL; INFILTRATION; INTRACAUDAL; PERINEURAL AS NEEDED
Status: DISCONTINUED | OUTPATIENT
Start: 2022-03-18 | End: 2022-03-18 | Stop reason: HOSPADM

## 2022-03-18 RX ORDER — PROPOFOL 10 MG/ML
INJECTION, EMULSION INTRAVENOUS AS NEEDED
Status: DISCONTINUED | OUTPATIENT
Start: 2022-03-18 | End: 2022-03-18 | Stop reason: HOSPADM

## 2022-03-18 RX ORDER — GLYCOPYRROLATE 0.2 MG/ML
INJECTION INTRAMUSCULAR; INTRAVENOUS AS NEEDED
Status: DISCONTINUED | OUTPATIENT
Start: 2022-03-18 | End: 2022-03-18 | Stop reason: HOSPADM

## 2022-03-18 RX ORDER — SODIUM CHLORIDE 0.9 % (FLUSH) 0.9 %
5-40 SYRINGE (ML) INJECTION AS NEEDED
Status: CANCELLED | OUTPATIENT
Start: 2022-03-18

## 2022-03-18 RX ADMIN — MIDAZOLAM 2 MG: 1 INJECTION INTRAMUSCULAR; INTRAVENOUS at 07:54

## 2022-03-18 RX ADMIN — PROPOFOL 20 MG: 10 INJECTION, EMULSION INTRAVENOUS at 08:47

## 2022-03-18 RX ADMIN — SODIUM CHLORIDE, POTASSIUM CHLORIDE, SODIUM LACTATE AND CALCIUM CHLORIDE 100 ML/HR: 600; 310; 30; 20 INJECTION, SOLUTION INTRAVENOUS at 07:49

## 2022-03-18 RX ADMIN — PROPOFOL 10 MG: 10 INJECTION, EMULSION INTRAVENOUS at 08:39

## 2022-03-18 RX ADMIN — GLYCOPYRROLATE 0.2 MG: 0.2 INJECTION, SOLUTION INTRAMUSCULAR; INTRAVENOUS at 07:54

## 2022-03-18 RX ADMIN — PROPOFOL 10 MG: 10 INJECTION, EMULSION INTRAVENOUS at 08:12

## 2022-03-18 RX ADMIN — PROPOFOL 10 MG: 10 INJECTION, EMULSION INTRAVENOUS at 08:20

## 2022-03-18 RX ADMIN — LIDOCAINE HYDROCHLORIDE 60 MG: 20 INJECTION, SOLUTION EPIDURAL; INFILTRATION; INTRACAUDAL; PERINEURAL at 07:58

## 2022-03-18 RX ADMIN — PROPOFOL 50 MG: 10 INJECTION, EMULSION INTRAVENOUS at 08:01

## 2022-03-18 RX ADMIN — PROPOFOL 20 MG: 10 INJECTION, EMULSION INTRAVENOUS at 08:40

## 2022-03-18 RX ADMIN — PROPOFOL 10 MG: 10 INJECTION, EMULSION INTRAVENOUS at 08:32

## 2022-03-18 RX ADMIN — PROPOFOL 10 MG: 10 INJECTION, EMULSION INTRAVENOUS at 08:18

## 2022-03-18 RX ADMIN — PROPOFOL 20 MG: 10 INJECTION, EMULSION INTRAVENOUS at 09:01

## 2022-03-18 RX ADMIN — PROPOFOL 10 MG: 10 INJECTION, EMULSION INTRAVENOUS at 08:41

## 2022-03-18 RX ADMIN — PROPOFOL 20 MG: 10 INJECTION, EMULSION INTRAVENOUS at 08:57

## 2022-03-18 RX ADMIN — PROPOFOL 10 MG: 10 INJECTION, EMULSION INTRAVENOUS at 08:16

## 2022-03-18 RX ADMIN — PROPOFOL 20 MG: 10 INJECTION, EMULSION INTRAVENOUS at 08:49

## 2022-03-18 RX ADMIN — PROPOFOL 10 MG: 10 INJECTION, EMULSION INTRAVENOUS at 08:38

## 2022-03-18 RX ADMIN — PROPOFOL 20 MG: 10 INJECTION, EMULSION INTRAVENOUS at 08:03

## 2022-03-18 RX ADMIN — PROPOFOL 20 MG: 10 INJECTION, EMULSION INTRAVENOUS at 08:43

## 2022-03-18 RX ADMIN — PROPOFOL 10 MG: 10 INJECTION, EMULSION INTRAVENOUS at 08:26

## 2022-03-18 RX ADMIN — PROPOFOL 20 MG: 10 INJECTION, EMULSION INTRAVENOUS at 08:10

## 2022-03-18 RX ADMIN — PROPOFOL 20 MG: 10 INJECTION, EMULSION INTRAVENOUS at 08:59

## 2022-03-18 RX ADMIN — PROPOFOL 20 MG: 10 INJECTION, EMULSION INTRAVENOUS at 09:03

## 2022-03-18 RX ADMIN — PROPOFOL 10 MG: 10 INJECTION, EMULSION INTRAVENOUS at 08:14

## 2022-03-18 RX ADMIN — PROPOFOL 20 MG: 10 INJECTION, EMULSION INTRAVENOUS at 08:51

## 2022-03-18 RX ADMIN — PROPOFOL 10 MG: 10 INJECTION, EMULSION INTRAVENOUS at 08:04

## 2022-03-18 RX ADMIN — PROPOFOL 10 MG: 10 INJECTION, EMULSION INTRAVENOUS at 08:24

## 2022-03-18 RX ADMIN — PROPOFOL 10 MG: 10 INJECTION, EMULSION INTRAVENOUS at 08:28

## 2022-03-18 RX ADMIN — PROPOFOL 10 MG: 10 INJECTION, EMULSION INTRAVENOUS at 08:06

## 2022-03-18 RX ADMIN — PROPOFOL 20 MG: 10 INJECTION, EMULSION INTRAVENOUS at 08:55

## 2022-03-18 RX ADMIN — PROPOFOL 10 MG: 10 INJECTION, EMULSION INTRAVENOUS at 08:08

## 2022-03-18 RX ADMIN — PROPOFOL 20 MG: 10 INJECTION, EMULSION INTRAVENOUS at 08:53

## 2022-03-18 RX ADMIN — PROPOFOL 20 MG: 10 INJECTION, EMULSION INTRAVENOUS at 08:45

## 2022-03-18 RX ADMIN — PROPOFOL 10 MG: 10 INJECTION, EMULSION INTRAVENOUS at 08:36

## 2022-03-18 RX ADMIN — PROPOFOL 10 MG: 10 INJECTION, EMULSION INTRAVENOUS at 08:22

## 2022-03-18 RX ADMIN — PROPOFOL 10 MG: 10 INJECTION, EMULSION INTRAVENOUS at 08:34

## 2022-03-18 RX ADMIN — PROPOFOL 10 MG: 10 INJECTION, EMULSION INTRAVENOUS at 08:30

## 2022-03-18 NOTE — DISCHARGE INSTRUCTIONS
- resume regular diet     - call if you develop abdominal pain, N/V or fevers     - May repeat colonoscopy in 10 years

## 2022-03-18 NOTE — H&P
History and Physical    Margherite D Jefrey Claude is an 59 y.o. female who is referred for a screening colonoscopy. Patients last scope was over 10 years ago and was normal.  She does have a history of iron deficiency anemia requiring three blood transfusions in mid 2020, however has not required any blood products since that period. Her last CBC was in 6/2020 and was normal.  Her last iron profile was in 6/21 and was also normal.  She denies any blood per rectum and all prior fecal occult test's were negative. She has past symptoms of reflux which are well controlled with omeprazole. She denies any change in bowel habits, stool caliber, or weight loss. She denies any history of IBD or diverticular disease. She also denies any family history of colon ca.        HPI           Patient Active Problem List   Diagnosis Code    Acute on chronic anemia D64.9    Iron deficiency anemia D50.9    Right ear pain H92.01    Herpes zoster with ophthalmic complication Y49.45    Hypothyroid E03.9    Vitamin D deficiency E55.9    MDD (major depressive disorder), recurrent severe, without psychosis ([facility] [address] F33.2    Allergic rhinitis J30.9           Patient Active Problem List     Diagnosis Date Noted    Allergic rhinitis 02/22/2021    Right ear pain 09/24/2020    Herpes zoster with ophthalmic complication 75/91/3607    Iron deficiency anemia 05/05/2020    Acute on chronic anemia 04/29/2020    [facility] 06/15/2017    Hypothyroid 05/23/2012    Vitamin D deficiency 05/23/2012             Current Outpatient Medications   Medication Sig Dispense Refill    lisdexamfetamine (Vyvanse) 50 mg cap Take 1 Capsule by mouth daily. Max Daily Amount: 50 mg. Indications: attention deficit disorder with hyperactivity 30 Capsule 0    vancomycin (VANCOCIN) 250 mg capsule Take 1 Capsule by mouth four (4) times daily for 10 days.  40 Capsule 0    desvenlafaxine succinate (PRISTIQ) 25 mg ER tablet Take 1 Tablet by mouth daily. 90 Tablet 1    Synthroid 75 mcg tablet Take 1 Tablet by mouth Daily (before breakfast). 90 Tablet 1    ergocalciferol (ERGOCALCIFEROL) 1,250 mcg (50,000 unit) capsule Take 1 Capsule by mouth every twenty-eight (28) days for 12 doses. 12 Capsule 0    busPIRone (BUSPAR) 5 mg tablet Take 1 Tablet by mouth two (2) times daily as needed for Other (anxiety). 30 Tablet 0    omeprazole (PRILOSEC) 40 mg capsule TAKE 1 CAPSULE BY MOUTH EVERY DAY 90 Capsule 3    fluticasone propionate (Flonase Allergy Relief) 50 mcg/actuation nasal spray 2 Sprays by Both Nostrils route daily.        ascorbic acid, vitamin C, (Vitamin C) 500 mg tablet Take 1,000 mg by mouth daily.        CYANOCOBALAMIN, VITAMIN B-12, 5,000 Units by Does Not Apply route daily.        bacitracin zinc (BACITRACIN) ointment Apply  to affected area two (2) times a day. (Patient not taking: Reported on 1/27/2022) 15 g 0           Allergies   Allergen Reactions    Macrobid [Nitrofurantoin Monohyd/M-Cryst] Palpitations    Meloxicam Other (comments)    Weed Pollen-Short Ragweed Rash           Past Medical History:   Diagnosis Date    Allergies      Anemia      Urticaria        History reviewed. No pertinent surgical history. Family History   Problem Relation Age of Onset    Alcohol abuse Father      Dementia Father      Alcohol abuse Brother      Heart Disease Mother           open heart surgery with valve replacement    Alcohol abuse Sister      Cancer Maternal Aunt           unknown    Cancer Maternal Uncle 79         prostate    Arthritis-rheumatoid Paternal Aunt      Cancer Paternal Uncle 79         prostate ?  Alcohol abuse Paternal Grandfather        Social History            Tobacco Use    Smoking status: Never Smoker    Smokeless tobacco: Never Used   Substance Use Topics    Alcohol use:  Yes       Alcohol/week: 1.0 standard drink       Types: 1 Shots of liquor per week     Comment: ususally 1 tequile shot per month         Review of Systems  Review of Systems   Constitutional: Negative for chills, fever, malaise/fatigue and weight loss. HENT: Positive for congestion. Negative for sore throat. Respiratory: Negative for cough and shortness of breath. Cardiovascular: Negative for chest pain, palpitations, orthopnea, claudication and leg swelling. Gastrointestinal: Positive for heartburn. Negative for abdominal pain, blood in stool, constipation, diarrhea, nausea and vomiting. Musculoskeletal: Negative for back pain, joint pain and neck pain. Skin: Negative for itching and rash. Neurological: Negative for weakness and headaches.         Objective:      Visit Vitals  /89   Pulse 69   Ht 5' 7\" (1.702 m)   Wt 170 lb (77.1 kg)   BMI 26.63 kg/m²      Physical Exam  Constitutional:       General: She is not in acute distress. Appearance: Normal appearance. She is normal weight. She is not ill-appearing or toxic-appearing. HENT:      Head: Normocephalic and atraumatic. Mouth/Throat:      Mouth: Mucous membranes are moist.      Pharynx: Oropharynx is clear. Eyes:      General: No scleral icterus. Cardiovascular:      Rate and Rhythm: Normal rate. Pulmonary:      Effort: Pulmonary effort is normal. No respiratory distress. Breath sounds: No wheezing. Abdominal:      General: There is no distension. Palpations: Abdomen is soft. Tenderness: There is no abdominal tenderness. Musculoskeletal:         General: No swelling. Normal range of motion. Cervical back: Normal range of motion and neck supple. Lymphadenopathy:      Cervical: No cervical adenopathy. Skin:     General: Skin is warm. Coloration: Skin is not jaundiced. Neurological:      General: No focal deficit present.       Mental Status: She is alert and oriented to person, place, and time.          Assessment/Plan:   59year old requiring screening colonoscopy    Colonoscopy is indicated as noted above and we will proceed to colonoscopy. The risks( bleeding, perforation, abdominal pain, etc. )  and benefits of my recommendations, as well as other treatment options were discussed with the patient today. Questions were answered. Prep is prescribed per orders.     Patient also wishes to have a EGD since she has prior history of anemia and reflux disease. I discussed the details of the procedure including risks (perforation, bleeding, aspiration, etc.).       The patient was counseled at length about the risks of priscila Covid-19 during their perioperative period and any recovery window from their procedure.  The patient was made aware that priscila Covid-19  may worsen their prognosis for recovering from their procedure and lend to a higher morbidity and/or mortality risk.  All material risks, benefits, and reasonable alternatives including postponing the procedure were discussed. The patient does wish to proceed with the procedure at this time.     No clinical change or change in plan

## 2022-03-18 NOTE — BRIEF OP NOTE
Brief Postoperative Note    Patient: Ej Ha  YOB: 1960  MRN: [id number]    Date of Procedure: 3/18/2022     Pre-Op Diagnosis: ENCOUNTER FOR COLON CANCER SCREENING                                History of GERD                                 History of Anemia     Post-Op Diagnosis: Same                                   Pan-diverticulosis                                   External hemorrhoids                                   Small HH     Procedure(s):  COLONOSCOPY with , EGD   .     Surgeon(s):  MD Lesly Babb MD    Surgical Assistant: Surg Asst-1: Sulaiman Escamilla RN    Anesthesia: MAC     Estimated Blood Loss (mL): none    Complications: none    Specimens: * No specimens in log *     Implants: * No implants in log *    Drains: * No LDAs found *    Findings:  EGD - Z line at 38 cm                             Small HH                   Colonoscopy: withdraw time over 6 mins                                        Prep adequate                                         Pan diverticulosis                                         External hemorrhoid    May repeat scope in 10 years                        Electronically Signed by Carol Fofana MD on 3/18/2022 at 9:06 AM

## 2022-03-18 NOTE — ANESTHESIA POSTPROCEDURE EVALUATION
Procedure(s):  COLONOSCOPY WITH POSSIBLE POLYPECTOMY, EGD WITH POSSIBLE BIOPSY  . Eleno Caballero    MAC    Anesthesia Post Evaluation      Multimodal analgesia: multimodal analgesia used between 6 hours prior to anesthesia start to PACU discharge  Patient location during evaluation: bedside  Patient participation: complete - patient participated  Level of consciousness: awake  Pain score: 0  Airway patency: patent  Anesthetic complications: no  Cardiovascular status: acceptable  Respiratory status: acceptable  Hydration status: acceptable  Post anesthesia nausea and vomiting:  none      INITIAL Post-op Vital signs:   Vitals Value Taken Time   BP 97/60 03/18/22 0910   Temp 37.1 °C (98.7 °F) 03/18/22 0909   Pulse 75 03/18/22 0915   Resp 11 03/18/22 0915   SpO2 100 % 03/18/22 0915   Vitals shown include unvalidated device data.

## 2022-03-19 PROBLEM — B02.30 HERPES ZOSTER WITH OPHTHALMIC COMPLICATION: Status: ACTIVE | Noted: 2020-09-24

## 2022-03-19 PROBLEM — H92.01 RIGHT EAR PAIN: Status: ACTIVE | Noted: 2020-09-24

## 2022-03-19 PROBLEM — J30.9 ALLERGIC RHINITIS: Status: ACTIVE | Noted: 2021-02-22

## 2022-03-20 PROBLEM — D50.9 IRON DEFICIENCY ANEMIA: Status: ACTIVE | Noted: 2020-05-05

## 2022-03-20 PROBLEM — F33.2 MDD (MAJOR DEPRESSIVE DISORDER), RECURRENT SEVERE, WITHOUT PSYCHOSIS (HCC): Status: ACTIVE | Noted: 2017-06-15

## 2022-03-20 PROBLEM — D64.9 ACUTE ON CHRONIC ANEMIA: Status: ACTIVE | Noted: 2020-04-29

## 2022-03-22 NOTE — OP NOTES
Erik Vera  OPERATIVE REPORT    Name:  Alo Gutierrez"  MR#:  [id number]  :  1960  ACCOUNT #:  [de-identified]  DATE OF SERVICE:  2022    PREOPERATIVE DIAGNOSES:  1. History of gastroesophageal reflux disease. 2.  Screening colonoscopy. 3.  History of anemia. POSTOPERATIVE DIAGNOSES:  1. Pandiverticulosis. 2.  External hemorrhoid. 3.  Small hiatal hernia. PROCEDURE PERFORMED:  EGD with colonoscopy. SURGEON:  Vianey Matos MD    ANESTHESIA:  Monitored conscious sedation. COMPLICATIONS: none    SPECIMENS REMOVED:  none    IMPLANTS:  none    ESTIMATED BLOOD LOSS: none    INTRAOPERATIVE FINDINGS:  On EGD, the Z-line was identified at 38 cm. There was a small hiatal hernia. No signs of inflammation of the esophagus or stomach or first or second portion of the duodenum. The colonoscopy showed pandiverticulosis without any inflammation and small external hemorrhoids. No evidence of polyps or AVMs. PROCEDURE:  The patient was brought to the operating room, placed in the left lateral decubitus position after undergoing monitored sedation and having a bite block placed. Time-out was taken to identify correct patient, procedure, and diagnosis, after which, the scope was placed through the oropharynx, guided behind the epiglottis, into the first portion of the esophagus, and insufflated. The esophagus was then meticulously inspected all the way down to the gastroesophageal junction. No signs of inflammation or pathology was noted. The Z-line was identified at approximately 38 cm. The scope was then gently advanced into the stomach. No signs of gastritis was noted. The scope was then further advanced into the antrum and then the pylorus. The scope was passed through the pylorus into the duodenal bulb and advanced to the second portion of the duodenum. There were no signs of inflammation or other pathology.   The scope was then slowly withdrawn back into the stomach, and then the area of the cardia was retroflexed. A small hiatal hernia was noted, with no other pathology. The scope was then straightened, pulled back into the GE junction, and the entire stomach was desufflated. The scope was then drawn back into the esophagus and finally out of the pharynx. The patient tolerated the procedure well. She was repositioned and digital rectal exam was performed, which showed small external hemorrhoids. The scope was then placed into the rectum and insufflated. Upon seeing the mucosal wall, the scope was then further advanced into the sigmoid, where diverticula were noted. The scope was then further advanced into the descending, transverse, and ascending colon. There was only further evidence of diverticular disease noted through advancement of the scope. Upon reaching the cecum, the cecum was identified with the cecal straps and the ileocecal valve. The scope was then slowly withdrawn to meticulously inspect the mucosal wall. There were no signs of AVMs or polyps. The scope was further withdrawn until the anal canal, at which point, it was pushed back into the rectum and retroflexed. No internal hemorrhoids were seen. Scope was then straightened and removed from the patient. The patient tolerated the procedure well and was transferred to PACU in good condition. The prep was adequate and the withdrawal time was over 6 minutes. The patient will not require another colonoscopy for approximately 10 years.       MD GREGORIO Newby/[email or web address]  D:  03/21/2022 22:46  T:  03/22/2022 4:55  JOB #:  [id number]

## 2022-03-30 RX ORDER — BUSPIRONE HYDROCHLORIDE 5 MG/1
5 TABLET ORAL
Qty: 60 TABLET | Refills: 2 | Status: SHIPPED | OUTPATIENT
Start: 2022-03-30 | End: 2022-04-04 | Stop reason: SDUPTHER

## 2022-04-04 ENCOUNTER — TELEPHONE (OUTPATIENT)
Dept: FAMILY MEDICINE CLINIC | Age: 62
End: 2022-04-04

## 2022-04-04 RX ORDER — BUSPIRONE HYDROCHLORIDE 5 MG/1
5 TABLET ORAL
Qty: 180 TABLET | Refills: 0 | Status: SHIPPED | OUTPATIENT
Start: 2022-04-04

## 2022-04-04 NOTE — TELEPHONE ENCOUNTER
Sp/w pt, advised rx sent to [facility] pharmacy. She asked to sp/w  to set up an appt for a knee issue. Been going on for 2 mths. Transferred call to .  MILAGROS

## 2022-04-04 NOTE — TELEPHONE ENCOUNTER
Rec request for 90 day supply of Buspirone Hcl 5mg, take 1 tablet po 2 times daily PRN anxiety.  [facility]

## 2022-04-21 ENCOUNTER — OFFICE VISIT (OUTPATIENT)
Dept: FAMILY MEDICINE CLINIC | Age: 62
End: 2022-04-21
Payer: MEDICAID

## 2022-04-21 VITALS
TEMPERATURE: 97.8 F | BODY MASS INDEX: 26.78 KG/M2 | DIASTOLIC BLOOD PRESSURE: 80 MMHG | RESPIRATION RATE: 14 BRPM | HEIGHT: 67 IN | SYSTOLIC BLOOD PRESSURE: 130 MMHG | OXYGEN SATURATION: 97 % | HEART RATE: 90 BPM | WEIGHT: 170.6 LBS

## 2022-04-21 DIAGNOSIS — G89.29 CHRONIC PAIN OF RIGHT KNEE: Primary | ICD-10-CM

## 2022-04-21 DIAGNOSIS — K57.50 DIVERTICULOSIS OF BOTH SMALL AND LARGE INTESTINE WITHOUT PERFORATION OR ABSCESS WITHOUT BLEEDING: ICD-10-CM

## 2022-04-21 DIAGNOSIS — L98.9 SCALP LESION: ICD-10-CM

## 2022-04-21 DIAGNOSIS — Z91.89 COMPLIANCE WITH MEDICATION REGIMEN: ICD-10-CM

## 2022-04-21 DIAGNOSIS — F90.9 ATTENTION DEFICIT HYPERACTIVITY DISORDER (ADHD), UNSPECIFIED ADHD TYPE: ICD-10-CM

## 2022-04-21 DIAGNOSIS — M25.561 CHRONIC PAIN OF RIGHT KNEE: Primary | ICD-10-CM

## 2022-04-21 DIAGNOSIS — M25.469 EDEMA OF KNEE: ICD-10-CM

## 2022-04-21 PROCEDURE — 99214 OFFICE O/P EST MOD 30 MIN: CPT | Performed by: NURSE PRACTITIONER

## 2022-04-21 RX ORDER — VANCOMYCIN HYDROCHLORIDE 250 MG/1
250 CAPSULE ORAL 4 TIMES DAILY
Qty: 40 CAPSULE | Refills: 0 | Status: SHIPPED | OUTPATIENT
Start: 2022-04-21 | End: 2022-05-05 | Stop reason: SDUPTHER

## 2022-04-21 NOTE — PATIENT INSTRUCTIONS
Diverticulosis: Care Instructions  Your Care Instructions  In diverticulosis, pouches called diverticula form in the wall of the large intestine (colon). The pouches do not cause any pain or other symptoms. Most people who have diverticulosis do not know they have it. But the pouches sometimes bleed, and if they become infected, they can cause pain and other symptoms. When this happens, it is called diverticulitis. Diverticula form when pressure pushes the wall of the colon outward at certain weak points. A diet that is too low in fiber can cause diverticula. Follow-up care is a key part of your treatment and safety. Be sure to make and go to all appointments, and call your doctor if you are having problems. It's also a good idea to know your test results and keep a list of the medicines you take. How can you care for yourself at home? · Include fruits, leafy green vegetables, beans, and whole grains in your diet each day. These foods are high in fiber. · Take a fiber supplement, such as Citrucel or Metamucil, every day if needed. Read and follow all instructions on the label. · Drink plenty of fluids. If you have kidney, heart, or liver disease and have to limit fluids, talk with your doctor before you increase the amount of fluids you drink. · Get at least 30 minutes of exercise on most days of the week. Walking is a good choice. You also may want to do other activities, such as running, swimming, cycling, or playing tennis or team sports. · Cut out foods that cause gas, pain, or other symptoms. When should you call for help?    Call your doctor now or seek immediate medical care if:    · You have belly pain.     · You pass maroon or very bloody stools.     · You have a fever.     · You have nausea and vomiting.     · You have unusual changes in your bowel movements or abdominal swelling.     · You have burning pain when you urinate.     · You have abnormal vaginal discharge.     · You have shoulder pain.     · You have cramping pain that does not get better when you have a bowel movement or pass gas.     · You pass gas or stool from your urethra while urinating. Watch closely for changes in your health, and be sure to contact your doctor if you have any problems. Where can you learn more? Go to http://www.gray.com/  Enter [id number] in the search box to learn more about \"Diverticulosis: Care Instructions. \"  Current as of: September 8, 2021               Content Version: 13.2  © 2006-2022 [employer]. Care instructions adapted under license by [employer] (which disclaims liability or warranty for this information). If you have questions about a medical condition or this instruction, always ask your healthcare professional. [address] any warranty or liability for your use of this information.

## 2022-04-21 NOTE — PROGRESS NOTES
1. \"Have you been to the ER, urgent care clinic since your last visit? Hospitalized since your last visit? \" No    2. \"Have you seen or consulted any other health care providers outside of the [address] [place] [address] since your last visit? \" No     3. For patients aged 39-70: Has the patient had a colonoscopy / FIT/ Cologuard? Yes - no Care Gap present      If the patient is female:    4. For patients aged 41-77: Has the patient had a mammogram within the past 2 years? Yes - Care Gap present. Rooming MA/LPN to request most recent results      5. For patients aged 21-65: Has the patient had a pap smear? Yes - Care Gap present.  Rooming MA/LPN to request most recent results

## 2022-04-22 NOTE — ACP (ADVANCE CARE PLANNING)
Discussed importance of advanced medical directives with patient. Patient is capable of making decisions.   Genella Favre NP-C

## 2022-04-22 NOTE — PROGRESS NOTES
Subjective:     Gin Woo is a 59 y.o. female who presents today with the following:  Chief Complaint   Patient presents with    Hair/Scalp Problem     f/u    Knee Pain     rt  with swelling       Patient Active Problem List   Diagnosis Code    Acute on chronic anemia D64.9    Iron deficiency anemia D50.9    Right ear pain H92.01    Herpes zoster with ophthalmic complication L75.26    Hypothyroid E03.9    Vitamin D deficiency E55.9    MDD (major depressive disorder), recurrent severe, without psychosis ([facility] [address] F33.2    Allergic rhinitis J30.9         COMPLIANT WITH MEDICATION:    Denies chest pain, dyspnea, palpitations, headache and blurred vision. Blood pressure normotensive. Knee swelling; . She endorse swelling x 3 months . Edema getting worse over the last few days. SHe has tied resting, Ice  And NSAIDs with improvement . She states it never totally goes away. Scalp lesions: x 4 years . Persistent Staphylococcus pseudintermedius. Scalp infection responds well to vancomycin but returns after course of treatment. ADHD; stable on vyvance             depression screening addressed not at risk    abuse screening addressed denies    learning assessment addressed reviewed nurses notes    fall risk addressed not at risk    HM: addressed declines HM interventions today.      ROS:  Gen: denies fever, chills, fatigue, weight loss, weight gain  HEENT:denies blurry vision, nasal congestion, sore throat  Resp: denies dypsnea, cough, wheezing  CV: denies chest pain radiating to the jaws or arms, palpitations, lower extremity edema  Abd: denies nausea, vomiting, diarrhea, constipation  Neuro: denies numbness/tingling  Endo: denies polyuria, polydipsia, heat/cold intolerance  Heme: no lymphadenopathy    Allergies   Allergen Reactions    Macrobid [Nitrofurantoin Monohyd/M-Cryst] Palpitations    Meloxicam Other (comments)    Weed Pollen-Short Ragweed Rash    Naproxen Rash         Current Outpatient Medications:     vancomycin (VANCOCIN) 250 mg capsule, Take 1 Capsule by mouth four (4) times daily for 10 days. , Disp: 40 Capsule, Rfl: 0    busPIRone (BUSPAR) 5 mg tablet, Take 1 Tablet by mouth two (2) times daily as needed for PRN Reason (Other) (anxiety). , Disp: 180 Tablet, Rfl: 0    acetaminophen (TYLENOL) 500 mg capsule, Take 1,000 mg by mouth as needed for Pain., Disp: , Rfl:     furosemide (Lasix) 20 mg tablet, Take 1 Tablet by mouth daily as needed (edema). Indications: visible water retention, Disp: 90 Tablet, Rfl: 1    lisdexamfetamine (Vyvanse) 50 mg cap, Take 1 Capsule by mouth daily. Max Daily Amount: 50 mg. Indications: attention deficit disorder with hyperactivity, Disp: 30 Capsule, Rfl: 0    bacitracin zinc (BACITRACIN) ointment, Apply  to affected area two (2) times a day., Disp: 15 g, Rfl: 0    desvenlafaxine succinate (PRISTIQ) 25 mg ER tablet, Take 1 Tablet by mouth daily. , Disp: 90 Tablet, Rfl: 1    Synthroid 75 mcg tablet, Take 1 Tablet by mouth Daily (before breakfast). , Disp: 90 Tablet, Rfl: 1    omeprazole (PRILOSEC) 40 mg capsule, TAKE 1 CAPSULE BY MOUTH EVERY DAY, Disp: 90 Capsule, Rfl: 3    fluticasone propionate (Flonase Allergy Relief) 50 mcg/actuation nasal spray, 2 Sprays by Both Nostrils route daily. , Disp: , Rfl:     ascorbic acid, vitamin C, (Vitamin C) 500 mg tablet, Take 1,000 mg by mouth daily. , Disp: , Rfl:     CYANOCOBALAMIN, VITAMIN B-12,, 5,000 Units by Does Not Apply route daily. , Disp: , Rfl:     Past Medical History:   Diagnosis Date    ADHD     Allergies     Anemia     Anxiety     GERD (gastroesophageal reflux disease)     Hyperthyroidism     hypothyroid    Urticaria        Past Surgical History:   Procedure Laterality Date    COLONOSCOPY N/A 3/18/2022    COLONOSCOPY WITH POSSIBLE POLYPECTOMY, EGD WITH POSSIBLE BIOPSY performed by James Samlls MD at [facility] [address] COLONOSCOPY  2009       Social History     Tobacco Use   Smoking Status Never Smoker   Smokeless Tobacco Never Used       Social History     Socioeconomic History    Marital status:     Number of children: 2    Years of education: 16   Tobacco Use    Smoking status: Never Smoker    Smokeless tobacco: Never Used   Vaping Use    Vaping Use: Never used   Substance and Sexual Activity    Alcohol use: Yes     Alcohol/week: 1.0 standard drink     Types: 1 Shots of liquor per week     Comment: ususally 1 tequile shot per month    Drug use: Never   Other Topics Concern     Service No    Blood Transfusions No    Caffeine Concern No    Occupational Exposure No    Hobby Hazards No    Sleep Concern No    Stress Concern No    Weight Concern No    Special Diet No    Back Care No    Exercise No    Bike Helmet No    Seat Belt Yes    Self-Exams Yes       Family History   Problem Relation Age of Onset    Alcohol abuse Father     Dementia Father     Alcohol abuse Brother     Heart Disease Mother         open heart surgery with valve replacement    Alcohol abuse Sister     Cancer Maternal Aunt         unknown    Cancer Maternal Uncle 79        prostate    Arthritis-rheumatoid Paternal Aunt     Cancer Paternal Uncle 79        prostate ?  Alcohol abuse Paternal Grandfather          Objective:     Visit Vitals  /80 (BP 1 Location: Right upper arm, BP Patient Position: Sitting, BP Cuff Size: Adult)   Pulse 90   Temp 97.8 °F (36.6 °C) (Temporal)   Resp 14   Ht 5' 7\" (1.702 m)   Wt 170 lb 9.6 oz (77.4 kg)   SpO2 97%   BMI 26.72 kg/m²     Body mass index is 26.72 kg/m². General: Alert and oriented. No acute distress. Well nourished  HEENT :  Head ;Scalp lesions throughout scalp in various stages. Most covered in scabs. Ears:TMs are normal. Canals are clear. Eyes: pupils equal, round, react to light and accommodation. Extra ocular movements intact. Nose: patent. Mouth and throat is clear. Neck:supple full range of motion no thyromegaly.  Trachea midline, No carotid bruits. No significant lymphadenopathy  Lungs[de-identified] clear to auscultation without wheezes, rales, or rhonchi. Heart :RRR, S1 & S2 are normal intensity. No murmur; no gallop  Back: no CVA tenderness. Extremities: without clubbing,or cyanosis, right knee edematous ROM in tact. Pulses: radial and femoral pulses are normal  Neuro: HMF intact. Cranial nerves II through XII grossly normal.  Motor: is 5 over 5 and symmetrical.   Deep tendon reflexes: +2 equal  Psych:appropriate behavior, mood, affect and judgement. Results for orders placed or performed during the hospital encounter of 03/14/22   SARS-COV-2   Result Value Ref Range    Specimen source Nasopharyngeal      SARS-CoV-2 Not detected NOTD         No results found for this visit on 04/21/22. Assessment/ Plan:     1. Chronic pain of right knee  Consider PT and or orthopedic referral depending on cause. - XR KNEE RT MIN 4 V; Future    2. Diverticulosis of both small and large intestine without perforation or abscess without bleeding  Patient education provided    3. Edema of knee    - XR KNEE RT MIN 4 V; Future    4. Attention deficit hyperactivity disorder (ADHD), unspecified ADHD type  Stable on Vyvanse  - TOXASSURE SELECT 13 (MW); Future  - TOXASSURE SELECT 13 (MW)    5. Compliance with medication regimen    - TOXASSURE SELECT 13 (MW); Future  - TOXASSURE SELECT 15 (MW)    6 -Scalp lesion   Refer to infectious disease.     Orders Placed This Encounter    XR KNEE RT MIN 4 V     4 views please   AP standing/Tunnel view/   lateral view 30 degree supine flexion and sunrise view supine     Standing Status:   Future     Standing Expiration Date:   10/21/2022     Scheduling Instructions:      [address] (MW)     Standing Status:   Future     Number of Occurrences:   1     Standing Expiration Date:   10/22/2022     Order Specific Question:   Patient Rx Info S-Z (Choose Trade or Generic, not both)     Answer:   Aubrey Kapoor [facility] EMPL     Referral Priority:   Routine     Referral Type:   Consultation     Referral Reason:   Specialty Services Required     Referred to Provider:   Ford Michael MD     Number of Visits Requested:   1    vancomycin (VANCOCIN) 250 mg capsule     Sig: Take 1 Capsule by mouth four (4) times daily for 10 days. Dispense:  40 Capsule     Refill:  0         Verbal and written instructions (see AVS) provided. Patient expresses understanding of diagnosis and treatment plan.     Health Maintenance Due   Topic Date Due    Hepatitis C Screening  Never done    COVID-19 Vaccine (1) Never done    Cervical cancer screen  Never done    Shingrix Vaccine Age 50> (1 of 2) Never done    Breast Cancer Screen Mammogram  Never done               Sumeet Rodriguez, RODNEYP-C

## 2022-04-25 DIAGNOSIS — F90.9 ATTENTION DEFICIT HYPERACTIVITY DISORDER (ADHD), UNSPECIFIED ADHD TYPE: ICD-10-CM

## 2022-04-27 ENCOUNTER — HOSPITAL ENCOUNTER (OUTPATIENT)
Dept: GENERAL RADIOLOGY | Age: 62
Discharge: HOME | End: 2022-04-27
Payer: MEDICAID

## 2022-04-27 DIAGNOSIS — M25.561 CHRONIC PAIN OF RIGHT KNEE: ICD-10-CM

## 2022-04-27 DIAGNOSIS — M25.469 EDEMA OF KNEE: ICD-10-CM

## 2022-04-27 DIAGNOSIS — G89.29 CHRONIC PAIN OF RIGHT KNEE: ICD-10-CM

## 2022-04-27 PROCEDURE — 73564 X-RAY EXAM KNEE 4 OR MORE: CPT

## 2022-04-28 RX ORDER — GUAIFENESIN 600 MG/1
600 TABLET, EXTENDED RELEASE ORAL 2 TIMES DAILY
Qty: 30 TABLET | Refills: 2 | Status: SHIPPED | OUTPATIENT
Start: 2022-04-28

## 2022-05-03 LAB — DRUGS UR: NORMAL

## 2022-05-05 RX ORDER — VANCOMYCIN HYDROCHLORIDE 250 MG/1
250 CAPSULE ORAL 4 TIMES DAILY
Qty: 40 CAPSULE | Refills: 0 | Status: SHIPPED | OUTPATIENT
Start: 2022-05-05 | End: 2022-05-15

## 2022-05-18 ENCOUNTER — TELEPHONE (OUTPATIENT)
Dept: FAMILY MEDICINE CLINIC | Age: 62
End: 2022-05-18

## 2022-05-18 DIAGNOSIS — L98.9 SCALP LESION: Primary | ICD-10-CM

## 2022-05-18 RX ORDER — VANCOMYCIN HYDROCHLORIDE 250 MG/1
250 CAPSULE ORAL 4 TIMES DAILY
Qty: 40 CAPSULE | Refills: 0 | Status: SHIPPED | OUTPATIENT
Start: 2022-05-18 | End: 2022-05-28

## 2022-05-18 NOTE — TELEPHONE ENCOUNTER
Patient is requesting another round of vancomycin for her scalp problem. She needs for her upcoming daughter wedding 5-. She states MARIA ESTHER Garcia had told her if flared up again before her appointments at dermatologist she would let her have this.  She would like it sent to [facility] in [address] , please advise

## 2022-05-18 NOTE — TELEPHONE ENCOUNTER
Pt called requesting refill of Vancomycin for scalp. [address] in [address] [place] [address]. States that she is all out.

## 2022-05-19 NOTE — TELEPHONE ENCOUNTER
Identified patient 2 identifiers verified. Patient has chronic infections in scalp with boils. Referred by Deepti Mccrary NP Patient was told to have her NP to send information to this office for review.

## 2022-05-23 ENCOUNTER — PATIENT MESSAGE (OUTPATIENT)
Dept: FAMILY MEDICINE CLINIC | Age: 62
End: 2022-05-23

## 2022-05-23 NOTE — TELEPHONE ENCOUNTER
Spoke with pt she stated that she has an ongoing problem with her scalp that started some 4 years ago with what was diagnosed ad folliculitis. More recent it has turned into boils that was tested and confirmed to be  Staph infections she is currently on her second dose of Vancomycin 2 times a day for 10 days. She has been evaluated by Immunology and Dermatology and was referred by Desmond Boyd NP. Pt would like an appt. Scheduled referral was sent 05/19/2022. Pt was made aware the Bin Noonan will be out of the office until 05/31/2022. Pt verbalized she understood.

## 2022-05-25 ENCOUNTER — OFFICE VISIT (OUTPATIENT)
Dept: FAMILY MEDICINE CLINIC | Age: 62
End: 2022-05-25
Payer: MEDICAID

## 2022-05-25 VITALS
WEIGHT: 168.2 LBS | DIASTOLIC BLOOD PRESSURE: 72 MMHG | RESPIRATION RATE: 16 BRPM | HEART RATE: 70 BPM | SYSTOLIC BLOOD PRESSURE: 130 MMHG | TEMPERATURE: 97.4 F | BODY MASS INDEX: 26.4 KG/M2 | HEIGHT: 67 IN | OXYGEN SATURATION: 99 %

## 2022-05-25 DIAGNOSIS — N95.1 VAGINAL DRYNESS, MENOPAUSAL: ICD-10-CM

## 2022-05-25 DIAGNOSIS — R30.0 DYSURIA: Primary | ICD-10-CM

## 2022-05-25 LAB
BILIRUB UR QL STRIP: NEGATIVE
GLUCOSE UR-MCNC: NEGATIVE MG/DL
KETONES P FAST UR STRIP-MCNC: NEGATIVE MG/DL
PH UR STRIP: 5.5 [PH] (ref 4.6–8)
PROT UR QL STRIP: NEGATIVE
SP GR UR STRIP: 1.03 (ref 1–1.03)
UA UROBILINOGEN AMB POC: NORMAL (ref 0.2–1)
URINALYSIS CLARITY POC: CLEAR
URINALYSIS COLOR POC: YELLOW
URINE BLOOD POC: NORMAL
URINE LEUKOCYTES POC: NORMAL
URINE NITRITES POC: NEGATIVE

## 2022-05-25 PROCEDURE — 81002 URINALYSIS NONAUTO W/O SCOPE: CPT

## 2022-05-25 PROCEDURE — 99213 OFFICE O/P EST LOW 20 MIN: CPT

## 2022-05-25 RX ORDER — SULFAMETHOXAZOLE AND TRIMETHOPRIM 800; 160 MG/1; MG/1
1 TABLET ORAL 2 TIMES DAILY
Qty: 20 TABLET | Refills: 0 | Status: SHIPPED | OUTPATIENT
Start: 2022-05-25 | End: 2022-06-04

## 2022-05-25 NOTE — PROGRESS NOTES
Chief Complaint   Patient presents with    UTI       HPI:     is a 59 y.o. female who presents for an acute visit. She notes dysuria, suprapubic pressure, and malaise onset about 3 days ago; this feels similar to previous episodes of UTI. She has been taking AZO and drinking more fluids, but her symptoms have not improved. She denies fever, chills, flank or back pain, hematuria, N/V. She also notes increasing vaginal dryness over the last month or so despite using Replens. She denies vaginal discharge or irritation, no concerns for STI. Allergies   Allergen Reactions    Macrobid [Nitrofurantoin Monohyd/M-Cryst] Palpitations    Meloxicam Other (comments)    Weed Pollen-Short Ragweed Rash    Naproxen Rash       Current Outpatient Medications   Medication Sig    trimethoprim-sulfamethoxazole (BACTRIM DS, SEPTRA DS) 160-800 mg per tablet Take 1 Tablet by mouth two (2) times a day for 10 days.  vancomycin (VANCOCIN) 250 mg capsule Take 1 Capsule by mouth four (4) times daily for 10 days.  lisdexamfetamine (Vyvanse) 50 mg cap Take 1 Capsule by mouth daily. Max Daily Amount: 50 mg. Indications: attention deficit disorder with hyperactivity    busPIRone (BUSPAR) 5 mg tablet Take 1 Tablet by mouth two (2) times daily as needed for PRN Reason (Other) (anxiety).  acetaminophen (TYLENOL) 500 mg capsule Take 1,000 mg by mouth as needed for Pain.  furosemide (Lasix) 20 mg tablet Take 1 Tablet by mouth daily as needed (edema). Indications: visible water retention    bacitracin zinc (BACITRACIN) ointment Apply  to affected area two (2) times a day.  desvenlafaxine succinate (PRISTIQ) 25 mg ER tablet Take 1 Tablet by mouth daily.  Synthroid 75 mcg tablet Take 1 Tablet by mouth Daily (before breakfast).     omeprazole (PRILOSEC) 40 mg capsule TAKE 1 CAPSULE BY MOUTH EVERY DAY    fluticasone propionate (Flonase Allergy Relief) 50 mcg/actuation nasal spray 2 Sprays by Both Nostrils route daily.  ascorbic acid, vitamin C, (Vitamin C) 500 mg tablet Take 1,000 mg by mouth daily.  CYANOCOBALAMIN, VITAMIN B-12, 5,000 Units by Does Not Apply route daily.  guaiFENesin ER (MUCINEX) 600 mg ER tablet Take 1 Tablet by mouth two (2) times a day. (Patient not taking: Reported on 5/25/2022)     No current facility-administered medications for this visit. Past Medical History:   Diagnosis Date    ADHD     Allergies     Anemia     Anxiety     GERD (gastroesophageal reflux disease)     Hyperthyroidism     hypothyroid    Urticaria        Family History   Problem Relation Age of Onset    Alcohol abuse Father     Dementia Father     Alcohol abuse Brother     Heart Disease Mother         open heart surgery with valve replacement    Alcohol abuse Sister     Cancer Maternal Aunt         unknown    Cancer Maternal Uncle 79        prostate    Arthritis-rheumatoid Paternal Aunt     Cancer Paternal Uncle 79        prostate ?  Alcohol abuse Paternal Grandfather        Review of Systems   Constitutional: Positive for malaise/fatigue. Negative for chills and fever. Genitourinary: Positive for dysuria. Negative for flank pain, frequency, hematuria and urgency. Musculoskeletal: Negative for back pain. Neurological: Negative. Negative for dizziness and headaches. Psychiatric/Behavioral: Negative.          /72 (BP 1 Location: Right upper arm, BP Patient Position: Sitting, BP Cuff Size: Adult)   Pulse 70   Temp 97.4 °F (36.3 °C) (Temporal)   Resp 16   Ht 5' 7\" (1.702 m)   Wt 168 lb 3.2 oz (76.3 kg)   SpO2 99%   BMI 26.34 kg/m²     Wt Readings from Last 3 Encounters:   05/25/22 168 lb 3.2 oz (76.3 kg)   04/21/22 170 lb 9.6 oz (77.4 kg)   03/18/22 165 lb (74.8 kg)       3 most recent PHQ Screens 5/25/2022   Little interest or pleasure in doing things Not at all   Feeling down, depressed, irritable, or hopeless Not at all   Total Score PHQ 2 0   Trouble falling or staying asleep, or sleeping too much -   Feeling tired or having little energy -   Poor appetite, weight loss, or overeating -   Feeling bad about yourself - or that you are a failure or have let yourself or your family down -   Trouble concentrating on things such as school, work, reading, or watching TV -   Moving or speaking so slowly that other people could have noticed; or the opposite being so fidgety that others notice -   Thoughts of being better off dead, or hurting yourself in some way -   PHQ 9 Score -   How difficult have these problems made it for you to do your work, take care of your home and get along with others -       Physical Exam  Constitutional:       Appearance: Normal appearance. HENT:      Head: Normocephalic and atraumatic. Cardiovascular:      Rate and Rhythm: Normal rate and regular rhythm. Pulses: Normal pulses. Heart sounds: Normal heart sounds. No murmur heard. No friction rub. No gallop. Pulmonary:      Effort: Pulmonary effort is normal.      Breath sounds: Normal breath sounds. No wheezing, rhonchi or rales. Abdominal:      General: Bowel sounds are normal.      Palpations: Abdomen is soft. There is no mass. Tenderness: There is no abdominal tenderness. There is no right CVA tenderness or left CVA tenderness. Skin:     General: Skin is warm and dry. Neurological:      General: No focal deficit present. Mental Status: She is alert and oriented to person, place, and time. Psychiatric:         Mood and Affect: Mood normal.         Behavior: Behavior normal.         Thought Content: Thought content normal.         Judgment: Judgment normal.       ASSESSMENT AND PLAN:       ICD-10-CM ICD-9-CM    1. Dysuria  R30.0 788.1 AMB POC URINALYSIS DIP STICK MANUAL W/O MICRO      CULTURE, URINE      CULTURE, URINE      trimethoprim-sulfamethoxazole (BACTRIM DS, SEPTRA DS) 160-800 mg per tablet   2.  Vaginal dryness, menopausal  N95.1 627.2        Results for orders placed or performed in visit on 05/25/22   AMB POC URINALYSIS DIP STICK MANUAL W/O MICRO     Status: None   Result Value Ref Range Status    Color (UA POC) Yellow  Final    Clarity (UA POC) Clear  Final    Glucose (UA POC) Negative Negative Final    Bilirubin (UA POC) Negative Negative Final    Ketones (UA POC) Negative Negative Final    Specific gravity (UA POC) 1.030 1.001 - 1.035 Final    Blood (UA POC) Trace Negative Final    pH (UA POC) 5.5 4.6 - 8.0 Final    Protein (UA POC) Negative Negative Final    Urobilinogen (UA POC) 0.2 mg/dL 0.2 - 1 Final    Nitrites (UA POC) Negative Negative Final    Leukocyte esterase (UA POC) 1+ Negative Final     Send urine for culture. Increase fluids to 2 liters daily of mostly water. Void with urge and after intercourse. Monitor temperature. To ER with high fever greater than 103, vomiting, abdominal pain, diarrhea, dehydration, not voiding greater than 6 hours, severe back or flank pain. Recommend she discuss risks/benefits of HRT to assist with vaginal dryness with PCP. Medication Side Effects and Warnings were discussed with patient:  yes  Patient Labs were reviewed:  yes  Patient Past Records were reviewed:  yes      Patient aware of plan of care and verbalized understanding. Questions answered. RTC PRN or sooner if needed. On this date 05/25/2022 I have spent 20 minutes reviewing previous notes, test results and face to face with the patient discussing the diagnosis and importance of compliance with the treatment plan as well as documenting on the day of the visit.     Ramo Monk NP

## 2022-05-25 NOTE — PATIENT INSTRUCTIONS
Menopausal Hormone Therapy (HT): Care Instructions  Overview     Hormone therapy (HT) is medicine to treat symptoms of menopause, such as hot flashes, vaginal dryness, and sleep problems. It replaces the hormones that drop at menopause. You may start to get relief from these symptoms within weeks of starting HT. Hormone therapy often contains two hormones, estrogen and progestin. HT may come in the form of a pill, patch, gel, spray, or vaginal ring. A vaginal cream, a vaginal tablet, or a vaginal ring that has a much lower dose of estrogen may be used to relieve dryness and other tissue changes in and around the vagina. HT has some risks. It may increase the chances of heart disease, breast cancer, blood clots, and stroke. Be sure to have regular checkups with your doctor when taking HT. Talk with your doctor about whether HT is right for you. Why might you take HT?  · HT reduces symptoms of menopause. These include hot flashes, mood swings, and sleep problems. · HT helps to build up the lining of the vagina and improve lubrication. This can reduce irritation. · The estrogen in HT helps to prevent thinning bones. What are the risks of taking HT? · Sometimes HT may cause vaginal bleeding, bloating, nausea, sore breasts, mood swings, and headaches. Talk to your doctor about changing the type of HT you take or lowering the dose. This may help to end these side effects. · Taking HT may slightly increase your risk for heart disease, breast cancer, blood clots, and stroke. · You should not take HT if you:  ? Could be pregnant. ? Have a personal history of pulmonary embolism, deep vein thrombosis, heart attack, or stroke. ? Have vaginal bleeding from an unknown cause. ? Have active liver disease. · Most people with a personal history of breast cancer or endometrial cancer should not take HT. But it may be an option for some. Ask your doctor. Where can you learn more?   Go to http://www.gray.com/  Enter V552 in the search box to learn more about \"Menopausal Hormone Therapy (HT): Care Instructions. \"  Current as of: November 22, 2021               Content Version: 13.2  © 2006-2022 [employer]. Care instructions adapted under license by [employer] (which disclaims liability or warranty for this information). If you have questions about a medical condition or this instruction, always ask your healthcare professional. [place] [address] any warranty or liability for your use of this information. Urinary Tract Infection (UTI) in Women: Care Instructions  Overview     A urinary tract infection, or UTI, is a general term for an infection anywhere between the kidneys and the urethra (where urine comes out). Most UTIs are bladder infections. They often cause pain or burning when you urinate. UTIs are caused by bacteria and can be cured with antibiotics. Be sure to complete your treatment so that the infection does not get worse. Follow-up care is a key part of your treatment and safety. Be sure to make and go to all appointments, and call your doctor if you are having problems. It's also a good idea to know your test results and keep a list of the medicines you take. How can you care for yourself at home? · Take your antibiotics as directed. Do not stop taking them just because you feel better. You need to take the full course of antibiotics. · Drink extra water and other fluids for the next day or two. This will help make the urine less concentrated and help wash out the bacteria that are causing the infection. (If you have kidney, heart, or liver disease and have to limit fluids, talk with your doctor before you increase the amount of fluids you drink.)  · Avoid drinks that are carbonated or have caffeine. They can irritate the bladder. · Urinate often. Try to empty your bladder each time.   · To relieve pain, take a hot bath or lay a heating pad set on low over your lower belly or genital area. Never go to sleep with a heating pad in place. To prevent UTIs  · Drink plenty of water each day. This helps you urinate often, which clears bacteria from your system. (If you have kidney, heart, or liver disease and have to limit fluids, talk with your doctor before you increase the amount of fluids you drink.)  · Urinate when you need to. · If you are sexually active, urinate right after you have sex. · Change sanitary pads often. · Avoid douches, bubble baths, feminine hygiene sprays, and other feminine hygiene products that have deodorants. · After going to the bathroom, wipe from front to back. When should you call for help? Call your doctor now or seek immediate medical care if:    · Symptoms such as fever, chills, nausea, or vomiting get worse or appear for the first time.     · You have new pain in your back just below your rib cage. This is called flank pain.     · There is new blood or pus in your urine.     · You have any problems with your antibiotic medicine. Watch closely for changes in your health, and be sure to contact your doctor if:    · You are not getting better after taking an antibiotic for 2 days.     · Your symptoms go away but then come back. Where can you learn more? Go to http://www.gray.com/  Enter Z918 in the search box to learn more about \"Urinary Tract Infection (UTI) in Women: Care Instructions. \"  Current as of: October 18, 2021               Content Version: 13.2  © 2006-2022 [employer]. Care instructions adapted under license by [employer] (which disclaims liability or warranty for this information). If you have questions about a medical condition or this instruction, always ask your healthcare professional. [address] any warranty or liability for your use of this information.

## 2022-05-25 NOTE — PROGRESS NOTES
1. \"Have you been to the ER, urgent care clinic since your last visit? Hospitalized since your last visit? \" No    2. \"Have you seen or consulted any other health care providers outside of the [address] [place] [address] since your last visit? \" No     3. For patients aged 39-70: Has the patient had a colonoscopy / FIT/ Cologuard? Yes - no Care Gap present      If the patient is female:    4. For patients aged 41-77: Has the patient had a mammogram within the past 2 years? Yes - no Care Gap present      5. For patients aged 21-65: Has the patient had a pap smear? Yes - no Care Gap present        No chief complaint on file.         Visit Vitals  /72 (BP 1 Location: Right upper arm, BP Patient Position: Sitting, BP Cuff Size: Adult)   Pulse 70   Temp 97.4 °F (36.3 °C) (Temporal)   Resp 16   Ht 5' 7\" (1.702 m)   Wt 168 lb 3.2 oz (76.3 kg)   SpO2 99%   BMI 26.34 kg/m²       Pain Scale: 0 - No pain/10  Pain Location:

## 2022-05-27 ENCOUNTER — TELEPHONE (OUTPATIENT)
Dept: FAMILY MEDICINE CLINIC | Age: 62
End: 2022-05-27

## 2022-05-27 DIAGNOSIS — F90.9 ATTENTION DEFICIT HYPERACTIVITY DISORDER (ADHD), UNSPECIFIED ADHD TYPE: ICD-10-CM

## 2022-05-27 NOTE — TELEPHONE ENCOUNTER
----- Message from Sami Sample sent at 5/27/2022 10:24 AM EDT -----  Subject: Refill Request    QUESTIONS  Name of Medication? lisdexamfetamine (Vyvanse) 50 mg cap  Patient-reported dosage and instructions? 50mg cap once daily  How many days do you have left? 0  Preferred Pharmacy? [id number]  Pharmacy phone number (if available)? [phone]  ---------------------------------------------------------------------------  --------------  CALL BACK INFO  What is the best way for the office to contact you? OK to leave message on   voicemail  Preferred Call Back Phone Number? [phone]  ---------------------------------------------------------------------------  --------------  SCRIPT ANSWERS  Relationship to Patient?  Self

## 2022-06-01 LAB — BACTERIA UR CULT: ABNORMAL

## 2022-06-02 NOTE — PROGRESS NOTES
PC  Ms Ines Fabian, she was informed of her  lab result / advice per Theresa. She stated OK and thanks, but wanted to inform Miguel Angel Almazan, the Bactrim has helped with the UTI but also is helping her scalp as well. They both are doing better. Message routed to Miguel Angel [place]mazan for [id number] [address].

## 2022-06-15 ENCOUNTER — OFFICE VISIT (OUTPATIENT)
Dept: FAMILY MEDICINE CLINIC | Age: 62
End: 2022-06-15
Payer: MEDICAID

## 2022-06-15 VITALS
OXYGEN SATURATION: 100 % | HEART RATE: 79 BPM | WEIGHT: 171.2 LBS | HEIGHT: 67 IN | BODY MASS INDEX: 26.87 KG/M2 | SYSTOLIC BLOOD PRESSURE: 118 MMHG | DIASTOLIC BLOOD PRESSURE: 60 MMHG | RESPIRATION RATE: 16 BRPM | TEMPERATURE: 97.3 F

## 2022-06-15 DIAGNOSIS — R30.0 DYSURIA: Primary | ICD-10-CM

## 2022-06-15 DIAGNOSIS — R23.8 DRY SCALP: ICD-10-CM

## 2022-06-15 DIAGNOSIS — M25.469 EDEMA OF KNEE: ICD-10-CM

## 2022-06-15 LAB
BILIRUB UR QL STRIP: NEGATIVE
GLUCOSE UR-MCNC: NEGATIVE MG/DL
KETONES P FAST UR STRIP-MCNC: NEGATIVE MG/DL
PH UR STRIP: 6 [PH] (ref 4.6–8)
PROT UR QL STRIP: NEGATIVE
SP GR UR STRIP: 1.01 (ref 1–1.03)
UA UROBILINOGEN AMB POC: NORMAL (ref 0.2–1)
URINALYSIS CLARITY POC: CLEAR
URINALYSIS COLOR POC: YELLOW
URINE BLOOD POC: NEGATIVE
URINE LEUKOCYTES POC: NEGATIVE
URINE NITRITES POC: NEGATIVE

## 2022-06-15 PROCEDURE — 99213 OFFICE O/P EST LOW 20 MIN: CPT | Performed by: NURSE PRACTITIONER

## 2022-06-15 PROCEDURE — 81002 URINALYSIS NONAUTO W/O SCOPE: CPT | Performed by: NURSE PRACTITIONER

## 2022-06-15 RX ORDER — IBUPROFEN 200 MG
200 TABLET ORAL
COMMUNITY

## 2022-06-15 NOTE — PROGRESS NOTES
Chief Complaint   Patient presents with    Knee Pain     right knee    Follow-up     scalp and uti       Visit Vitals  /60 (BP 1 Location: Right arm, BP Patient Position: Sitting, BP Cuff Size: Adult)   Pulse 79   Temp 97.3 °F (36.3 °C) (Temporal)   Resp 16   Ht 5' 7\" (1.702 m)   Wt 171 lb 3.2 oz (77.7 kg)   SpO2 100%   BMI 26.81 kg/m²       1. Have you been to the ER, urgent care clinic since your last visit? Hospitalized since your last visit? No    2. Have you seen or consulted any other health care providers outside of the [address] [place] [address] since your last visit? Include any pap smears or colon screening. No    3 most recent PHQ Screens 6/15/2022   Little interest or pleasure in doing things Not at all   Feeling down, depressed, irritable, or hopeless Not at all   Total Score PHQ 2 0   Trouble falling or staying asleep, or sleeping too much -   Feeling tired or having little energy -   Poor appetite, weight loss, or overeating -   Feeling bad about yourself - or that you are a failure or have let yourself or your family down -   Trouble concentrating on things such as school, work, reading, or watching TV -   Moving or speaking so slowly that other people could have noticed; or the opposite being so fidgety that others notice -   Thoughts of being better off dead, or hurting yourself in some way -   PHQ 9 Score -   How difficult have these problems made it for you to do your work, take care of your home and get along with others -     Recent Travel Screening and Travel History documentation     Travel Screening     Question   Response    In the last 10 days, have you been in contact with someone who was confirmed or suspected to have Coronavirus/COVID-19? No / Unsure    Have you had a COVID-19 viral test in the last 10 days? No    Do you have any of the following new or worsening symptoms? None of these    Have you traveled internationally or domestically in the last month?   No      Travel History   Travel since 05/15/22    No documented travel since 05/15/22         Results for orders placed or performed in visit on 06/15/22   AMB POC URINALYSIS DIP STICK MANUAL W/O MICRO   Result Value Ref Range    Color (UA POC) Yellow     Clarity (UA POC) Clear     Glucose (UA POC) Negative Negative    Bilirubin (UA POC) Negative Negative    Ketones (UA POC) Negative Negative    Specific gravity (UA POC) 1.015 1.001 - 1.035    Blood (UA POC) Negative Negative    pH (UA POC) 6.0 4.6 - 8.0    Protein (UA POC) Negative Negative    Urobilinogen (UA POC) 0.2 mg/dL 0.2 - 1    Nitrites (UA POC) Negative Negative    Leukocyte esterase (UA POC) Negative Negative

## 2022-06-16 NOTE — PROGRESS NOTES
Subjective:     Teresa Priest is a 59 y.o. female who presents today with the following:  Chief Complaint   Patient presents with    Knee Pain     right knee    Follow-up     scalp and uti       Patient Active Problem List   Diagnosis Code    Acute on chronic anemia D64.9    Iron deficiency anemia D50.9    Right ear pain H92.01    Herpes zoster with ophthalmic complication P35.33    Hypothyroid E03.9    Vitamin D deficiency E55.9    MDD (major depressive disorder), recurrent severe, without psychosis ([facility] [address] F33.2    Allergic rhinitis J30.9         COMPLIANT WITH MEDICATION:    Denies chest pain, dyspnea, palpitations, headache and blurred vision. Blood pressure normotensive. Dry Scalp areas of dryness using Neutragena moisturizer for scalp    UTI- completed antibiotics. Ms. Jessica Mcintyre request a repeat urine. Asymptomatic at this time. depression screening addressed not at risk    abuse screening addressed denies    learning assessment addressed reviewed nurses notes    fall risk addressed not at risk    HM: addressed remonded to schedule a well woman exam.    ROS:  Gen: denies fever, chills, fatigue, weight loss, weight gain  HEENT:denies blurry vision, nasal congestion, sore throat  Resp: denies dypsnea, cough, wheezing  CV: denies chest pain radiating to the jaws or arms, palpitations, lower extremity edema  Abd: denies nausea, vomiting, diarrhea, constipation  Neuro: denies numbness/tingling  Endo: denies polyuria, polydipsia, heat/cold intolerance  Heme: no lymphadenopathy    Allergies   Allergen Reactions    Macrobid [Nitrofurantoin Monohyd/M-Cryst] Palpitations    Meloxicam Other (comments)    Weed Pollen-Short Ragweed Rash    Naproxen Rash         Current Outpatient Medications:     ibuprofen (AdviL) 200 mg tablet, Take 200 mg by mouth., Disp: , Rfl:     lisdexamfetamine (Vyvanse) 50 mg cap, Take 1 Capsule by mouth daily. Max Daily Amount: 50 mg.  Indications: attention deficit disorder with hyperactivity, Disp: 30 Capsule, Rfl: 0    guaiFENesin ER (MUCINEX) 600 mg ER tablet, Take 1 Tablet by mouth two (2) times a day., Disp: 30 Tablet, Rfl: 2    busPIRone (BUSPAR) 5 mg tablet, Take 1 Tablet by mouth two (2) times daily as needed for PRN Reason (Other) (anxiety). , Disp: 180 Tablet, Rfl: 0    acetaminophen (TYLENOL) 500 mg capsule, Take 1,000 mg by mouth as needed for Pain., Disp: , Rfl:     furosemide (Lasix) 20 mg tablet, Take 1 Tablet by mouth daily as needed (edema). Indications: visible water retention, Disp: 90 Tablet, Rfl: 1    desvenlafaxine succinate (PRISTIQ) 25 mg ER tablet, Take 1 Tablet by mouth daily. , Disp: 90 Tablet, Rfl: 1    Synthroid 75 mcg tablet, Take 1 Tablet by mouth Daily (before breakfast). , Disp: 90 Tablet, Rfl: 1    omeprazole (PRILOSEC) 40 mg capsule, TAKE 1 CAPSULE BY MOUTH EVERY DAY, Disp: 90 Capsule, Rfl: 3    fluticasone propionate (Flonase Allergy Relief) 50 mcg/actuation nasal spray, 2 Sprays by Both Nostrils route daily. , Disp: , Rfl:     ascorbic acid, vitamin C, (Vitamin C) 500 mg tablet, Take 1,000 mg by mouth daily. , Disp: , Rfl:     CYANOCOBALAMIN, VITAMIN B-12,, 5,000 Units by Does Not Apply route daily. , Disp: , Rfl:     Past Medical History:   Diagnosis Date    ADHD     Allergies     Anemia     Anxiety     GERD (gastroesophageal reflux disease)     Hyperthyroidism     hypothyroid    Urticaria        Past Surgical History:   Procedure Laterality Date    COLONOSCOPY N/A 3/18/2022    COLONOSCOPY WITH POSSIBLE POLYPECTOMY, EGD WITH POSSIBLE BIOPSY performed by Jenae Bowers MD at [facility] [address]  COLONOSCOPY  2009       Social History     Tobacco Use   Smoking Status Never Smoker   Smokeless Tobacco Never Used       Social History     Socioeconomic History    Marital status:     Number of children: 2    Years of education: 16   Tobacco Use    Smoking status: Never Smoker    Smokeless tobacco: Never Used   Vaping Use    Vaping Use: Never used   Substance and Sexual Activity    Alcohol use: Yes     Alcohol/week: 1.0 standard drink     Types: 1 Shots of liquor per week     Comment: ususally 1 tequile shot per month    Drug use: Never   Other Topics Concern     Service No    Blood Transfusions No    Caffeine Concern No    Occupational Exposure No    Hobby Hazards No    Sleep Concern No    Stress Concern No    Weight Concern No    Special Diet No    Back Care No    Exercise No    Bike Helmet No    Seat Belt Yes    Self-Exams Yes     Social Determinants of Health     Financial Resource Strain: Low Risk     Difficulty of Paying Living Expenses: Not hard at all   Food Insecurity: No Food Insecurity    Worried About Running Out of Food in the Last Year: Never true    Sandor of Food in the Last Year: Never true   Transportation Needs: No Transportation Needs    Lack of Transportation (Medical): No    Lack of Transportation (Non-Medical): No       Family History   Problem Relation Age of Onset    Alcohol abuse Father     Dementia Father     Alcohol abuse Brother     Heart Disease Mother         open heart surgery with valve replacement    Alcohol abuse Sister     Cancer Maternal Aunt         unknown    Cancer Maternal Uncle 79        prostate    Arthritis-rheumatoid Paternal Aunt     Cancer Paternal Uncle 79        prostate ?  Alcohol abuse Paternal Grandfather          Objective:     Visit Vitals  /60 (BP 1 Location: Right arm, BP Patient Position: Sitting, BP Cuff Size: Adult)   Pulse 79   Temp 97.3 °F (36.3 °C) (Temporal)   Resp 16   Ht 5' 7\" (1.702 m)   Wt 171 lb 3.2 oz (77.7 kg)   SpO2 100%   BMI 26.81 kg/m²     Body mass index is 26.81 kg/m². General: Alert and oriented. No acute distress. Well nourished  HEENT : Head areas orf dry patches   Ears:TMs are normal. Canals are clear. Eyes: pupils equal, round, react to light and accommodation. Extra ocular movements intact. Nose: patent.  Mouth and throat is clear. Neck:supple full range of motion no thyromegaly. Trachea midline, No carotid bruits. No significant lymphadenopathy  Lungs[de-identified] clear to auscultation without wheezes, rales, or rhonchi. Heart :RRR, S1 & S2 are normal intensity. No murmur; no gallop  Abdomen: bowel sounds active. No tenderness, guarding, rebound, masses, hepatic or spleen enlargement  Back: no CVA tenderness. Extremities: without clubbing, cyanosis, or edema  Pulses: radial and femoral pulses are normal  Neuro: HMF intact. Cranial nerves II through XII grossly normal.  Motor: is 5 over 5 and symmetrical.   Deep tendon reflexes: +2 equal  Psych:appropriate behavior, mood, affect and judgement. Results for orders placed or performed in visit on 06/15/22   AMB POC URINALYSIS DIP STICK MANUAL W/O MICRO   Result Value Ref Range    Color (UA POC) Yellow     Clarity (UA POC) Clear     Glucose (UA POC) Negative Negative    Bilirubin (UA POC) Negative Negative    Ketones (UA POC) Negative Negative    Specific gravity (UA POC) 1.015 1.001 - 1.035    Blood (UA POC) Negative Negative    pH (UA POC) 6.0 4.6 - 8.0    Protein (UA POC) Negative Negative    Urobilinogen (UA POC) 0.2 mg/dL 0.2 - 1    Nitrites (UA POC) Negative Negative    Leukocyte esterase (UA POC) Negative Negative       Results for orders placed or performed in visit on 06/15/22   AMB POC URINALYSIS DIP STICK MANUAL W/O MICRO   Result Value Ref Range    Color (UA POC) Yellow     Clarity (UA POC) Clear     Glucose (UA POC) Negative Negative    Bilirubin (UA POC) Negative Negative    Ketones (UA POC) Negative Negative    Specific gravity (UA POC) 1.015 1.001 - 1.035    Blood (UA POC) Negative Negative    pH (UA POC) 6.0 4.6 - 8.0    Protein (UA POC) Negative Negative    Urobilinogen (UA POC) 0.2 mg/dL 0.2 - 1    Nitrites (UA POC) Negative Negative    Leukocyte esterase (UA POC) Negative Negative       Assessment/ Plan:       ICD-10-CM ICD-9-CM    1.  Dysuria  R30.0 788.1 AMB POC URINALYSIS DIP STICK MANUAL W/O MICRO   2. Dry scalp  R23.8 701.1    3. Edema of knee  M25.469 719.06      1. Dysuria  UA within normal limits . - AMB POC URINALYSIS DIP STICK MANUAL W/O MICRO    2. Dry scalp  Continue Neutragena moisturizer for scalp. Orders Placed This Encounter    AMB POC URINALYSIS DIP STICK MANUAL W/O MICRO    ibuprofen (AdviL) 200 mg tablet     Sig: Take 200 mg by mouth. Verbal and written instructions (see AVS) provided. Patient expresses understanding of diagnosis and treatment plan.     Health Maintenance Due   Topic Date Due    Hepatitis C Screening  Never done               OMAR Quezada

## 2022-06-25 ENCOUNTER — PATIENT MESSAGE (OUTPATIENT)
Dept: FAMILY MEDICINE CLINIC | Age: 62
End: 2022-06-25

## 2022-06-25 DIAGNOSIS — F90.9 ATTENTION DEFICIT HYPERACTIVITY DISORDER (ADHD), UNSPECIFIED ADHD TYPE: ICD-10-CM

## 2022-06-27 NOTE — TELEPHONE ENCOUNTER
From: Usha Hahn  To: Rita Klinefelter, NP  Sent: 6/25/2022 9:50 AM EDT  Subject: Vyvanse prescription     Vaughn Garcia,  I hope you are doing good! I just wanted to ask for a renewed prescription for my Vyvance please. I have moved, so I will need it sent to [facility] to be picked up there now.

## 2022-06-29 ENCOUNTER — OFFICE VISIT (OUTPATIENT)
Dept: FAMILY MEDICINE CLINIC | Age: 62
End: 2022-06-29
Payer: MEDICAID

## 2022-06-29 VITALS
TEMPERATURE: 99.1 F | RESPIRATION RATE: 16 BRPM | BODY MASS INDEX: 26.37 KG/M2 | OXYGEN SATURATION: 97 % | DIASTOLIC BLOOD PRESSURE: 70 MMHG | WEIGHT: 168 LBS | HEART RATE: 97 BPM | HEIGHT: 67 IN | SYSTOLIC BLOOD PRESSURE: 122 MMHG

## 2022-06-29 DIAGNOSIS — D50.8 OTHER IRON DEFICIENCY ANEMIA: ICD-10-CM

## 2022-06-29 DIAGNOSIS — F41.9 ANXIETY: ICD-10-CM

## 2022-06-29 DIAGNOSIS — Z11.59 ENCOUNTER FOR HEPATITIS C SCREENING TEST FOR LOW RISK PATIENT: ICD-10-CM

## 2022-06-29 DIAGNOSIS — F90.9 ATTENTION DEFICIT HYPERACTIVITY DISORDER (ADHD), UNSPECIFIED ADHD TYPE: ICD-10-CM

## 2022-06-29 DIAGNOSIS — R10.32 LEFT LOWER QUADRANT ABDOMINAL PAIN: ICD-10-CM

## 2022-06-29 DIAGNOSIS — K57.50 DIVERTICULOSIS OF BOTH SMALL AND LARGE INTESTINE WITHOUT PERFORATION OR ABSCESS WITHOUT BLEEDING: Primary | ICD-10-CM

## 2022-06-29 LAB
BILIRUB UR QL STRIP: NORMAL
GLUCOSE UR-MCNC: NEGATIVE MG/DL
KETONES P FAST UR STRIP-MCNC: NEGATIVE MG/DL
PH UR STRIP: 5 [PH] (ref 4.6–8)
PROT UR QL STRIP: NEGATIVE
SP GR UR STRIP: 1.02 (ref 1–1.03)
UA UROBILINOGEN AMB POC: NORMAL (ref 0.2–1)
URINALYSIS CLARITY POC: CLEAR
URINALYSIS COLOR POC: YELLOW
URINE BLOOD POC: NEGATIVE
URINE LEUKOCYTES POC: NEGATIVE
URINE NITRITES POC: NEGATIVE

## 2022-06-29 PROCEDURE — 99214 OFFICE O/P EST MOD 30 MIN: CPT | Performed by: NURSE PRACTITIONER

## 2022-06-29 PROCEDURE — 81003 URINALYSIS AUTO W/O SCOPE: CPT | Performed by: NURSE PRACTITIONER

## 2022-06-29 PROCEDURE — 36415 COLL VENOUS BLD VENIPUNCTURE: CPT | Performed by: NURSE PRACTITIONER

## 2022-06-29 RX ORDER — METRONIDAZOLE 250 MG/1
250 TABLET ORAL 3 TIMES DAILY
Qty: 30 TABLET | Refills: 0 | Status: SHIPPED | OUTPATIENT
Start: 2022-06-29 | End: 2022-07-06 | Stop reason: SDUPTHER

## 2022-06-30 NOTE — PROGRESS NOTES
Subjective:      Kieran Villanueva is an 59 y.o. female who presents for evaluation of abdominal pain. The pain is described as cramping, and is 9/10 in intensity. Pain is located in the RLQ without radiation. Onset was 3 weeks ago. She has endorses that she was diagnosed with diverticulosis several years ago and has never had an attackun til now. She has eaten raw carrots and other vegetables with pasta over the past few weeks. She endorses frequent bowel movements. Aggravating factors: consuming large quantities of raw veggies  Alleviating factors: small meals and snacks and resting her stomach in between. Associated symptoms: none. The patient denies anorexia, chills, diarrhea, dysuria, hematuria and joint pain. ADHD Vyvanse effective compliant    Other iron deficiency  Hx anemia HGB down to 4. She was  transfused 2 units in May of 2020. . Ms Kentrell Villanueva endorses she is craving ice chips. This was a first sign of anemia. Past Medical History:   Diagnosis Date    ADHD     Allergies     Anemia     Anxiety     GERD (gastroesophageal reflux disease)     Hyperthyroidism     hypothyroid    Urticaria      Family History   Problem Relation Age of Onset    Alcohol abuse Father     Dementia Father     Alcohol abuse Brother     Heart Disease Mother         open heart surgery with valve replacement    Alcohol abuse Sister     Cancer Maternal Aunt         unknown    Cancer Maternal Uncle 79        prostate    Arthritis-rheumatoid Paternal Aunt     Cancer Paternal Uncle 79        prostate ?  Alcohol abuse Paternal Grandfather      Current Outpatient Medications   Medication Sig Dispense Refill    metroNIDAZOLE (FLAGYL) 250 mg tablet Take 1 Tablet by mouth three (3) times daily for 10 days. 30 Tablet 0    ibuprofen (AdviL) 200 mg tablet Take 200 mg by mouth.  guaiFENesin ER (MUCINEX) 600 mg ER tablet Take 1 Tablet by mouth two (2) times a day.  30 Tablet 2    busPIRone (BUSPAR) 5 mg tablet Take 1 Tablet by mouth two (2) times daily as needed for PRN Reason (Other) (anxiety). 180 Tablet 0    acetaminophen (TYLENOL) 500 mg capsule Take 1,000 mg by mouth as needed for Pain.  furosemide (Lasix) 20 mg tablet Take 1 Tablet by mouth daily as needed (edema). Indications: visible water retention 90 Tablet 1    desvenlafaxine succinate (PRISTIQ) 25 mg ER tablet Take 1 Tablet by mouth daily. 90 Tablet 1    Synthroid 75 mcg tablet Take 1 Tablet by mouth Daily (before breakfast). 90 Tablet 1    omeprazole (PRILOSEC) 40 mg capsule TAKE 1 CAPSULE BY MOUTH EVERY DAY 90 Capsule 3    fluticasone propionate (Flonase Allergy Relief) 50 mcg/actuation nasal spray 2 Sprays by Both Nostrils route daily.  ascorbic acid, vitamin C, (Vitamin C) 500 mg tablet Take 1,000 mg by mouth daily.  CYANOCOBALAMIN, VITAMIN B-12, 5,000 Units by Does Not Apply route daily.  lisdexamfetamine (Vyvanse) 50 mg cap Take 1 Capsule by mouth daily. Max Daily Amount: 50 mg. Indications: attention deficit disorder with hyperactivity 30 Capsule 0     Allergies   Allergen Reactions    Macrobid [Nitrofurantoin Monohyd/M-Cryst] Palpitations    Meloxicam Other (comments)    Weed Pollen-Short Ragweed Rash    Naproxen Rash     Social History     Socioeconomic History    Marital status:      Spouse name: Not on file    Number of children: 2    Years of education: 12    Highest education level: Not on file   Occupational History    Not on file   Tobacco Use    Smoking status: Never Smoker    Smokeless tobacco: Never Used   Vaping Use    Vaping Use: Never used   Substance and Sexual Activity    Alcohol use:  Yes     Alcohol/week: 1.0 standard drink     Types: 1 Shots of liquor per week     Comment: ususally 1 tequile shot per month    Drug use: Never    Sexual activity: Not on file   Other Topics Concern     Service No    Blood Transfusions No    Caffeine Concern No    Occupational Exposure No    Hobby Hazards No    Sleep Concern No    Stress Concern No    Weight Concern No    Special Diet No    Back Care No    Exercise No    Bike Helmet No    Seat Belt Yes    Self-Exams Yes   Social History Narrative    Not on file     Social Determinants of Health     Financial Resource Strain: Low Risk     Difficulty of Paying Living Expenses: Not hard at all   Food Insecurity: No Food Insecurity    Worried About Running Out of Food in the Last Year: Never true    Sandor of Food in the Last Year: Never true   Transportation Needs: No Transportation Needs    Lack of Transportation (Medical): No    Lack of Transportation (Non-Medical): No   Physical Activity:     Days of Exercise per Week: Not on file    Minutes of Exercise per Session: Not on file   Stress:     Feeling of Stress : Not on file   Social Connections:     Frequency of Communication with Friends and Family: Not on file    Frequency of Social Gatherings with Friends and Family: Not on file    Attends Jainism Services: Not on file    Active Member of [address] [place] [address] or Organizations: Not on file    Attends Club or Organization Meetings: Not on file    Marital Status: Not on file   Intimate Partner Violence:     Fear of Current or Ex-Partner: Not on file    Emotionally Abused: Not on file    Physically Abused: Not on file    Sexually Abused: Not on file   Housing Stability:     Unable to Pay for Housing in the Last Year: Not on file    Number of [place] in the Last Year: Not on file    Unstable Housing in the Last Year: Not on file     Review of Systems  Pertinent items are noted in HPI.      Objective:        Visit Vitals  /70 (BP 1 Location: Right upper arm, BP Patient Position: Sitting, BP Cuff Size: Adult)   Pulse 97   Temp 99.1 °F (37.3 °C) (Oral)   Resp 16   Ht 5' 7\" (1.702 m)   Wt 168 lb (76.2 kg)   SpO2 97%   BMI 26.31 kg/m²     General:   alert, cooperative, no distress, appears stated age   Skin:   no rash or abnormalities Eyes:  negative   Mouth:  MMM no lesions   Lymph Nodes:   Cervical, supraclavicular, and axillary nodes normal.   Lungs:   clear to auscultation bilaterally   Heart:   regular rate and rhythm, S1, S2 normal, no murmur, click, rub or gallop   Abdomen:  soft, non-tender. Bowel sounds normal. No masses,  no organomegaly, tenderness LLQ   CVA:   absent   Genitourinary:  exam deferred   Extremities:   extremities normal, atraumatic, no cyanosis or edema   Neurologic:   AOx3. Gait normal. Reflexes and motor strength normal and symmetric. Cranial nerves 2-12 and sensation grossly intact. Psychiatric:   depressed, pressured speach     Assessment:   1. Left lower quadrant abdominal pain    - AMB POC URINALYSIS DIP STICK AUTO W/O MICRO  - COLLECTION VENOUS BLOOD,VENIPUNCTURE; Future  - COLLECTION VENOUS BLOOD,VENIPUNCTURE    2. Diverticulosis of both small and large intestine without perforation or abscess without bleeding    Flagyl as noted below. Patient education provided on AVS .    3. Other iron deficiency anemia    - CBC WITH AUTOMATED DIFF; Future  - IRON PROFILE; Future  - COLLECTION VENOUS BLOOD,VENIPUNCTURE; Future  - CBC WITH AUTOMATED DIFF  - IRON PROFILE  - COLLECTION VENOUS BLOOD,VENIPUNCTURE    4. Encounter for hepatitis C screening test for low risk patient    - HEPATITIS C AB; Future  - COLLECTION VENOUS BLOOD,VENIPUNCTURE; Future  - HEPATITIS C AB  - COLLECTION VENOUS BLOOD,VENIPUNCTURE    5. Attention deficit hyperactivity disorder (ADHD), unspecified ADHD type  Continue Vyvanse    Plan:   The diagnosis was discussed with the patient and evaluation and treatment plans outlined. Initiate trial of anticholinergic Rx for IBS.

## 2022-07-01 LAB
BASOPHILS # BLD AUTO: 0 X10E3/UL (ref 0–0.2)
BASOPHILS NFR BLD AUTO: 1 %
EOSINOPHIL # BLD AUTO: 0.1 X10E3/UL (ref 0–0.4)
EOSINOPHIL NFR BLD AUTO: 1 %
ERYTHROCYTE [DISTWIDTH] IN BLOOD BY AUTOMATED COUNT: 14.5 % (ref 11.7–15.4)
HCT VFR BLD AUTO: 34.8 % (ref 34–46.6)
HCV AB S/CO SERPL IA: 0.2 S/CO RATIO (ref 0–0.9)
HGB BLD-MCNC: 10.3 G/DL (ref 11.1–15.9)
IMM GRANULOCYTES # BLD AUTO: 0 X10E3/UL (ref 0–0.1)
IMM GRANULOCYTES NFR BLD AUTO: 0 %
IRON SATN MFR SERPL: 6 % (ref 15–55)
IRON SERPL-MCNC: 22 UG/DL (ref 27–139)
LYMPHOCYTES # BLD AUTO: 0.8 X10E3/UL (ref 0.7–3.1)
LYMPHOCYTES NFR BLD AUTO: 14 %
MCH RBC QN AUTO: 25.6 PG (ref 26.6–33)
MCHC RBC AUTO-ENTMCNC: 29.6 G/DL (ref 31.5–35.7)
MCV RBC AUTO: 87 FL (ref 79–97)
MONOCYTES # BLD AUTO: 0.4 X10E3/UL (ref 0.1–0.9)
MONOCYTES NFR BLD AUTO: 7 %
NEUTROPHILS # BLD AUTO: 4.3 X10E3/UL (ref 1.4–7)
NEUTROPHILS NFR BLD AUTO: 77 %
PLATELET # BLD AUTO: 291 X10E3/UL (ref 150–450)
RBC # BLD AUTO: 4.02 X10E6/UL (ref 3.77–5.28)
TIBC SERPL-MCNC: 377 UG/DL (ref 250–450)
UIBC SERPL-MCNC: 355 UG/DL (ref 118–369)
WBC # BLD AUTO: 5.6 X10E3/UL (ref 3.4–10.8)

## 2022-07-06 ENCOUNTER — TELEPHONE (OUTPATIENT)
Dept: ONCOLOGY | Age: 62
End: 2022-07-06

## 2022-07-06 RX ORDER — METRONIDAZOLE 250 MG/1
250 TABLET ORAL 3 TIMES DAILY
Qty: 30 TABLET | Refills: 0 | Status: SHIPPED | OUTPATIENT
Start: 2022-07-06 | End: 2022-10-17 | Stop reason: SDUPTHER

## 2022-07-06 RX ORDER — SIMETHICONE 80 MG
80 TABLET,CHEWABLE ORAL
Qty: 30 TABLET | Refills: 1 | Status: SHIPPED | OUTPATIENT
Start: 2022-07-06

## 2022-07-06 RX ORDER — CIPROFLOXACIN 250 MG/1
250 TABLET, FILM COATED ORAL 2 TIMES DAILY
Qty: 20 TABLET | Refills: 0 | Status: SHIPPED | OUTPATIENT
Start: 2022-07-06 | End: 2022-10-17 | Stop reason: SDUPTHER

## 2022-07-06 NOTE — TELEPHONE ENCOUNTER
Called PT and left her a VM of her new upcoming appt with Dr James Aceves. She is active on [employer] so she will see all the information there as well and I let her know if this day and time does not work for her or if she would like to change it to a virtual appt she can do so and call us back to have that rescheduled.

## 2022-07-18 DIAGNOSIS — F41.9 ANXIETY: ICD-10-CM

## 2022-07-18 RX ORDER — FLUVOXAMINE MALEATE 50 MG/1
50 TABLET ORAL
Qty: 180 TABLET | Refills: 1 | Status: SHIPPED | OUTPATIENT
Start: 2022-07-18

## 2022-07-21 RX ORDER — LEVOTHYROXINE SODIUM 75 UG/1
75 TABLET ORAL
Qty: 90 TABLET | Refills: 1 | Status: SHIPPED | OUTPATIENT
Start: 2022-07-21

## 2022-08-08 DIAGNOSIS — F90.9 ATTENTION DEFICIT HYPERACTIVITY DISORDER (ADHD), UNSPECIFIED ADHD TYPE: ICD-10-CM

## 2022-08-29 DIAGNOSIS — J30.89 NON-SEASONAL ALLERGIC RHINITIS DUE TO OTHER ALLERGIC TRIGGER: Primary | ICD-10-CM

## 2022-09-13 DIAGNOSIS — F90.9 ATTENTION DEFICIT HYPERACTIVITY DISORDER (ADHD), UNSPECIFIED ADHD TYPE: ICD-10-CM

## 2022-10-17 ENCOUNTER — VIRTUAL VISIT (OUTPATIENT)
Dept: FAMILY MEDICINE CLINIC | Age: 62
End: 2022-10-17
Payer: MEDICAID

## 2022-10-17 DIAGNOSIS — F90.9 ATTENTION DEFICIT HYPERACTIVITY DISORDER (ADHD), UNSPECIFIED ADHD TYPE: ICD-10-CM

## 2022-10-17 DIAGNOSIS — K21.9 GASTROESOPHAGEAL REFLUX DISEASE WITHOUT ESOPHAGITIS: ICD-10-CM

## 2022-10-17 PROCEDURE — 99442 PR PHYS/QHP TELEPHONE EVALUATION 11-20 MIN: CPT | Performed by: NURSE PRACTITIONER

## 2022-10-17 RX ORDER — METRONIDAZOLE 250 MG/1
250 TABLET ORAL 3 TIMES DAILY
Qty: 30 TABLET | Refills: 0 | Status: SHIPPED | OUTPATIENT
Start: 2022-10-17 | End: 2022-10-27

## 2022-10-17 RX ORDER — OMEPRAZOLE 40 MG/1
CAPSULE, DELAYED RELEASE ORAL
Qty: 90 CAPSULE | Refills: 3 | Status: SHIPPED | OUTPATIENT
Start: 2022-10-17

## 2022-10-17 RX ORDER — CIPROFLOXACIN 250 MG/1
250 TABLET, FILM COATED ORAL 2 TIMES DAILY
Qty: 20 TABLET | Refills: 0 | Status: SHIPPED | OUTPATIENT
Start: 2022-10-17 | End: 2022-10-27

## 2022-10-17 NOTE — TELEPHONE ENCOUNTER
Message routed to provider to approve refill. Requested Prescriptions     Pending Prescriptions Disp Refills    lisdexamfetamine (Vyvanse) 50 mg cap 30 Capsule 0     Sig: Take 1 Capsule by mouth daily. Max Daily Amount: 50 mg.  Indications: attention deficit disorder with hyperactivity

## 2022-10-17 NOTE — PROGRESS NOTES
Chief Complaint   Patient presents with    Irritable Bowel Syndrome     Diarrhea with abdominal pain, pain score #2     1. \"Have you been to the ER, urgent care clinic since your last visit? Hospitalized since your last visit? \" No    2. \"Have you seen or consulted any other health care providers outside of the [address] [place] [address] since your last visit? \" No     3. For patients aged 39-70: Has the patient had a colonoscopy / FIT/ Cologuard? Yes - no Care Gap present      If the patient is female:    4. For patients aged 41-77: Has the patient had a mammogram within the past 2 years? Yes - no Care Gap present      5. For patients aged 21-65: Has the patient had a pap smear?  Yes - no Care Gap present

## 2022-10-18 ENCOUNTER — DOCUMENTATION ONLY (OUTPATIENT)
Dept: FAMILY MEDICINE CLINIC | Age: 62
End: 2022-10-18

## 2022-10-18 NOTE — PROGRESS NOTES
Called patient's insurance company for prior authorization needed on Omeprazole 40 mg   It has been Approved . From  10.18.22 12 am until 10.18.23 12 am.  Reference Number for case [id number]. [facility] has been advised .

## 2022-10-23 NOTE — ACP (ADVANCE CARE PLANNING)
Discussed importance of advanced medical directives with patient. Patient is capable of making decisions. Cira Mae NP-C

## 2022-10-23 NOTE — PROGRESS NOTES
Ludy Borges is a 57 y.o. female, evaluated via audio-only technology on 10/17/2022 for Irritable Bowel Syndrome (Diarrhea with abdominal pain, pain score #2)  . Hx of IBS she endorses IBS flare up with diarrhea. She denies fever bodyaches or chills. She is requesting treatment. Cipro and flagyl have been very effective in the past.     Assessment & Plan:   Diagnoses and all orders for this visit:    1. Gastroesophageal reflux disease without esophagitis  -     omeprazole (PRILOSEC) 40 mg capsule; TAKE 1 CAPSULE BY MOUTH EVERY DAY    Other orders  -     metroNIDAZOLE (FLAGYL) 250 mg tablet; Take 1 Tablet by mouth three (3) times daily for 10 days. -     ciprofloxacin HCl (CIPRO) 250 mg tablet; Take 1 Tablet by mouth two (2) times a day for 10 days. The complexity of medical decision making for this visit is moderate       12  Subjective:       Prior to Admission medications    Medication Sig Start Date End Date Taking? Authorizing Provider   metroNIDAZOLE (FLAGYL) 250 mg tablet Take 1 Tablet by mouth three (3) times daily for 10 days. 10/17/22 10/27/22 Yes Harsha Resendiz NP   ciprofloxacin HCl (CIPRO) 250 mg tablet Take 1 Tablet by mouth two (2) times a day for 10 days. 10/17/22 10/27/22 Yes Harsha Resendiz NP   omeprazole (PRILOSEC) 40 mg capsule TAKE 1 CAPSULE BY MOUTH EVERY DAY 10/17/22  Yes Harsha Resendiz NP   Synthroid 75 mcg tablet Take 1 Tablet by mouth Daily (before breakfast). 7/21/22  Yes Harsha Resendiz NP   fluvoxaMINE (LUVOX) 50 mg tablet Take 1 Tablet by mouth nightly. 7/18/22  Yes Harsha Resendiz NP   simethicone (MYLICON) 80 mg chewable tablet Take 1 Tablet by mouth every six (6) hours as needed for Flatulence. 7/6/22  Yes Harsha Resendiz NP   ibuprofen (MOTRIN) 200 mg tablet Take 200 mg by mouth. Yes Provider, Historical   guaiFENesin ER (MUCINEX) 600 mg ER tablet Take 1 Tablet by mouth two (2) times a day.  4/28/22  Yes Harsha Resendiz NP   busPIRone (BUSPAR) 5 mg tablet Take 1 Tablet by mouth two (2) times daily as needed for PRN Reason (Other) (anxiety). 4/4/22  Yes Manuel Shelley NP   acetaminophen (TYLENOL) 500 mg capsule Take 1,000 mg by mouth as needed for Pain. Yes Provider, Historical   furosemide (Lasix) 20 mg tablet Take 1 Tablet by mouth daily as needed (edema). Indications: visible water retention 3/7/22  Yes Evelyne Vasquez NP   desvenlafaxine succinate (PRISTIQ) 25 mg ER tablet Take 1 Tablet by mouth daily. 11/2/21  Yes Evelyne Vasquez NP   fluticasone propionate (FLONASE) 50 mcg/actuation nasal spray 2 Sprays by Both Nostrils route daily. Yes Provider, Historical   ascorbic acid, vitamin C, (VITAMIN C) 500 mg tablet Take 1,000 mg by mouth daily. Yes Provider, Historical   CYANOCOBALAMIN, VITAMIN B-12, 5,000 Units by Does Not Apply route daily. Yes Provider, Historical   lisdexamfetamine (Vyvanse) 50 mg cap Take 1 Capsule by mouth daily. Max Daily Amount: 50 mg. Indications: attention deficit disorder with hyperactivity 10/17/22   Evelyne Vasquez NP     Patient Active Problem List   Diagnosis Code    Acute on chronic anemia D64.9    Iron deficiency anemia D50.9    Right ear pain H92.01    Herpes zoster with ophthalmic complication R17.05    Hypothyroid E03.9    Vitamin D deficiency E55.9    MDD (major depressive disorder), recurrent severe, without psychosis ([facility] F33.2    Allergic rhinitis J30.9     Patient Active Problem List    Diagnosis Date Noted    Allergic rhinitis 02/22/2021    Right ear pain 09/24/2020    Herpes zoster with ophthalmic complication 16/61/1263    Iron deficiency anemia 05/05/2020    Acute on chronic anemia 04/29/2020    MDD (major depressive disorder), recurrent severe, without psychosis ([facility] 06/15/2017    Hypothyroid 05/23/2012    Vitamin D deficiency 05/23/2012     Current Outpatient Medications   Medication Sig Dispense Refill    metroNIDAZOLE (FLAGYL) 250 mg tablet Take 1 Tablet by mouth three (3) times daily for 10 days.  30 Tablet 0    ciprofloxacin HCl (CIPRO) 250 mg tablet Take 1 Tablet by mouth two (2) times a day for 10 days. 20 Tablet 0    omeprazole (PRILOSEC) 40 mg capsule TAKE 1 CAPSULE BY MOUTH EVERY DAY 90 Capsule 3    Synthroid 75 mcg tablet Take 1 Tablet by mouth Daily (before breakfast). 90 Tablet 1    fluvoxaMINE (LUVOX) 50 mg tablet Take 1 Tablet by mouth nightly. 180 Tablet 1    simethicone (MYLICON) 80 mg chewable tablet Take 1 Tablet by mouth every six (6) hours as needed for Flatulence. 30 Tablet 1    ibuprofen (MOTRIN) 200 mg tablet Take 200 mg by mouth.      guaiFENesin ER (MUCINEX) 600 mg ER tablet Take 1 Tablet by mouth two (2) times a day. 30 Tablet 2    busPIRone (BUSPAR) 5 mg tablet Take 1 Tablet by mouth two (2) times daily as needed for PRN Reason (Other) (anxiety). 180 Tablet 0    acetaminophen (TYLENOL) 500 mg capsule Take 1,000 mg by mouth as needed for Pain. furosemide (Lasix) 20 mg tablet Take 1 Tablet by mouth daily as needed (edema). Indications: visible water retention 90 Tablet 1    desvenlafaxine succinate (PRISTIQ) 25 mg ER tablet Take 1 Tablet by mouth daily. 90 Tablet 1    fluticasone propionate (FLONASE) 50 mcg/actuation nasal spray 2 Sprays by Both Nostrils route daily. ascorbic acid, vitamin C, (VITAMIN C) 500 mg tablet Take 1,000 mg by mouth daily. CYANOCOBALAMIN, VITAMIN B-12, 5,000 Units by Does Not Apply route daily. lisdexamfetamine (Vyvanse) 50 mg cap Take 1 Capsule by mouth daily. Max Daily Amount: 50 mg.  Indications: attention deficit disorder with hyperactivity 30 Capsule 0     Allergies   Allergen Reactions    Macrobid [Nitrofurantoin Monohyd/M-Cryst] Palpitations    Meloxicam Other (comments)    Weed Pollen-Short Ragweed Rash    Naproxen Rash     Past Medical History:   Diagnosis Date    ADHD     Allergies     Anemia     Anxiety     GERD (gastroesophageal reflux disease)     Hyperthyroidism     hypothyroid    Urticaria      Past Surgical History:   Procedure Laterality Date    COLONOSCOPY N/A 3/18/2022    COLONOSCOPY WITH POSSIBLE POLYPECTOMY, EGD WITH POSSIBLE BIOPSY performed by Jaye Pickett MD at [place] [address]    HX COLONOSCOPY  2009     Family History   Problem Relation Age of Onset    Alcohol abuse Father     Dementia Father     Alcohol abuse Brother     Heart Disease Mother         open heart surgery with valve replacement    Alcohol abuse Sister     Cancer Maternal Aunt         unknown    Cancer Maternal Uncle 79        prostate    Arthritis-rheumatoid Paternal Aunt     Cancer Paternal Uncle 79        prostate ? Alcohol abuse Paternal Grandfather      Social History     Tobacco Use    Smoking status: Never    Smokeless tobacco: Never   Substance Use Topics    Alcohol use: Yes     Alcohol/week: 1.0 standard drink     Types: 1 Shots of liquor per week     Comment: ususally 1 tequile shot per month       ROS  Pertinent items are noted in the HPI  No data recorded     Kieran Hahn was evaluated through a patient-initiated, synchronous (real-time) audio only encounter. She (or guardian if applicable) is aware that it is a billable service, which includes applicable co-pays, with coverage as determined by her insurance carrier. This visit was conducted with the patient's (and/or Annmarie Meléndez guardian's) verbal consent. She has not had a related appointment within my department in the past 7 days or scheduled within the next 24 hours. The patient was located in a state where the provider was licensed to provide care. The patient was located at: Home: [place] [address]  The provider was located at:  Facility (Appt Department): [id number] [id number]    Total Time: minutes: 11-20 minutes    Colt Heart NP

## 2022-10-28 RX ORDER — ALBUTEROL SULFATE 90 UG/1
2 AEROSOL, METERED RESPIRATORY (INHALATION)
Qty: 1 EACH | Refills: 3 | Status: SHIPPED | OUTPATIENT
Start: 2022-10-28 | End: 2022-11-27

## 2022-11-11 NOTE — TELEPHONE ENCOUNTER
Message routed to provider to approve refill. Requested Prescriptions     Pending Prescriptions Disp Refills    ciprofloxacin HCl (CIPRO) 250 mg tablet 20 Tablet 0     Sig: Take 1 Tablet by mouth two (2) times a day for 10 days. metroNIDAZOLE (FLAGYL) 250 mg tablet 30 Tablet 0     Sig: Take 1 Tablet by mouth three (3) times daily for 10 days.          Last Office Visit With Provider 10/17/2022

## 2022-11-14 RX ORDER — CIPROFLOXACIN 250 MG/1
250 TABLET, FILM COATED ORAL 2 TIMES DAILY
Qty: 20 TABLET | Refills: 0 | Status: SHIPPED | OUTPATIENT
Start: 2022-11-14 | End: 2022-11-16 | Stop reason: SDUPTHER

## 2022-11-14 RX ORDER — METRONIDAZOLE 250 MG/1
250 TABLET ORAL 3 TIMES DAILY
Qty: 30 TABLET | Refills: 0 | Status: SHIPPED | OUTPATIENT
Start: 2022-11-14 | End: 2022-11-16 | Stop reason: SDUPTHER

## 2022-11-16 ENCOUNTER — VIRTUAL VISIT (OUTPATIENT)
Dept: FAMILY MEDICINE CLINIC | Age: 62
End: 2022-11-16
Payer: MEDICAID

## 2022-11-16 DIAGNOSIS — K57.92 DIVERTICULITIS: Primary | ICD-10-CM

## 2022-11-16 PROCEDURE — 99442 PR PHYS/QHP TELEPHONE EVALUATION 11-20 MIN: CPT | Performed by: NURSE PRACTITIONER

## 2022-11-16 RX ORDER — CIPROFLOXACIN 250 MG/1
250 TABLET, FILM COATED ORAL 2 TIMES DAILY
Qty: 20 TABLET | Refills: 0 | Status: SHIPPED | OUTPATIENT
Start: 2022-11-16 | End: 2022-11-26

## 2022-11-16 RX ORDER — METRONIDAZOLE 250 MG/1
250 TABLET ORAL 3 TIMES DAILY
Qty: 30 TABLET | Refills: 0 | Status: SHIPPED | OUTPATIENT
Start: 2022-11-16 | End: 2022-11-26

## 2022-11-16 NOTE — PROGRESS NOTES
Chief Complaint   Patient presents with    Diverticulitis     1. \"Have you been to the ER, urgent care clinic since your last visit? Hospitalized since your last visit? \" No    2. \"Have you seen or consulted any other health care providers outside of the [address] [place] [address] since your last visit? \" No     3. For patients aged 39-70: Has the patient had a colonoscopy / FIT/ Cologuard? Yes - no Care Gap present      If the patient is female:    4. For patients aged 41-77: Has the patient had a mammogram within the past 2 years? Yes - no Care Gap present      5. For patients aged 21-65: Has the patient had a pap smear?  Yes - no Care Gap present

## 2022-11-17 NOTE — PROGRESS NOTES
Syd Gillette is a 57 y.o. female, evaluated via audio-only technology on 11/16/2022 for Diverticulitis  . Flare up on Friday and through the weekend for abdominal cramping and diarrhea. Assessment & Plan:   Diagnoses and all orders for this visit:    1. Diverticulitis    Other orders  -     ciprofloxacin HCl (CIPRO) 250 mg tablet; Take 1 Tablet by mouth two (2) times a day for 10 days. -     metroNIDAZOLE (FLAGYL) 250 mg tablet; Take 1 Tablet by mouth three (3) times daily for 10 days. The complexity of medical decision making for this visit is moderate       12  Subjective:       Prior to Admission medications    Medication Sig Start Date End Date Taking? Authorizing Provider   ciprofloxacin HCl (CIPRO) 250 mg tablet Take 1 Tablet by mouth two (2) times a day for 10 days. 11/16/22 11/26/22 Yes Gianfranco Bullock NP   metroNIDAZOLE (FLAGYL) 250 mg tablet Take 1 Tablet by mouth three (3) times daily for 10 days. 11/16/22 11/26/22 Yes Gianfranco Bullock NP   omeprazole (PRILOSEC) 40 mg capsule TAKE 1 CAPSULE BY MOUTH EVERY DAY 10/17/22  Yes Gianfranco Bullock NP   Synthroid 75 mcg tablet Take 1 Tablet by mouth Daily (before breakfast). 7/21/22  Yes Gianfranco Bullock NP   fluvoxaMINE (LUVOX) 50 mg tablet Take 1 Tablet by mouth nightly. 7/18/22  Yes Gianfranco Bullock NP   ascorbic acid, vitamin C, (VITAMIN C) 500 mg tablet Take 1,000 mg by mouth daily. Yes Provider, Historical   CYANOCOBALAMIN, VITAMIN B-12, 5,000 Units by Does Not Apply route daily. Yes Provider, Historical   ciprofloxacin HCl (CIPRO) 250 mg tablet Take 1 Tablet by mouth two (2) times a day for 10 days. Patient not taking: Reported on 11/16/2022 11/14/22 11/16/22  Gianfranco Bullock NP   metroNIDAZOLE (FLAGYL) 250 mg tablet Take 1 Tablet by mouth three (3) times daily for 10 days.   Patient not taking: Reported on 11/16/2022 11/14/22 11/16/22  Gianfranco Bullock NP   albuterol (PROVENTIL HFA, VENTOLIN HFA, PROAIR HFA) 90 mcg/actuation inhaler Take 2 Puffs by inhalation every four (4) hours as needed for Wheezing for up to 30 days. Indications: bronchospasm  Patient not taking: Reported on 11/16/2022 10/28/22 11/27/22  Cherry Crandall NP   lisdexamfetamine (Vyvanse) 50 mg cap Take 1 Capsule by mouth daily. Max Daily Amount: 50 mg. Indications: attention deficit disorder with hyperactivity  Patient not taking: Reported on 11/16/2022 10/17/22   Cherry Crandall NP   [facility]) 80 mg chewable tablet Take 1 Tablet by mouth every six (6) hours as needed for Flatulence. Patient not taking: Reported on 11/16/2022 7/6/22   Cherry Crandall NP   ibuprofen (MOTRIN) 200 mg tablet Take 200 mg by mouth. Patient not taking: Reported on 11/16/2022    Provider, Historical   guaiFENesin ER (MUCINEX) 600 mg ER tablet Take 1 Tablet by mouth two (2) times a day. Patient not taking: Reported on 11/16/2022 4/28/22   Cherry Crandall NP   busPIRone (BUSPAR) 5 mg tablet Take 1 Tablet by mouth two (2) times daily as needed for PRN Reason (Other) (anxiety). Patient not taking: Reported on 11/16/2022 4/4/22   Colton Lugo NP   acetaminophen (TYLENOL) 500 mg capsule Take 1,000 mg by mouth as needed for Pain. Patient not taking: Reported on 11/16/2022    Provider, Historical   furosemide (Lasix) 20 mg tablet Take 1 Tablet by mouth daily as needed (edema). Indications: visible water retention  Patient not taking: Reported on 11/16/2022 3/7/22   Cherry Crandall NP   desvenlafaxine succinate (PRISTIQ) 25 mg ER tablet Take 1 Tablet by mouth daily. Patient not taking: Reported on 11/16/2022 11/2/21   Cherry Crandall NP   fluticasone propionate (FLONASE) 50 mcg/actuation nasal spray 2 Sprays by Both Nostrils route daily.   Patient not taking: Reported on 11/16/2022    Provider, Historical     Patient Active Problem List   Diagnosis Code    Acute on chronic anemia D64.9    Iron deficiency anemia D50.9    Right ear pain H92.01    Herpes zoster with ophthalmic complication B02.30    Hypothyroid E03.9    Vitamin D deficiency E55.9    MDD (major depressive disorder), recurrent severe, without psychosis ([facility] F33.2    Allergic rhinitis J30.9     Patient Active Problem List    Diagnosis Date Noted    Allergic rhinitis 02/22/2021    Right ear pain 09/24/2020    Herpes zoster with ophthalmic complication 51/49/2491    Iron deficiency anemia 05/05/2020    Acute on chronic anemia 04/29/2020    MDD (major depressive disorder), recurrent severe, without psychosis ([facility] 06/15/2017    Hypothyroid 05/23/2012    Vitamin D deficiency 05/23/2012     Current Outpatient Medications   Medication Sig Dispense Refill    ciprofloxacin HCl (CIPRO) 250 mg tablet Take 1 Tablet by mouth two (2) times a day for 10 days. 20 Tablet 0    metroNIDAZOLE (FLAGYL) 250 mg tablet Take 1 Tablet by mouth three (3) times daily for 10 days. 30 Tablet 0    omeprazole (PRILOSEC) 40 mg capsule TAKE 1 CAPSULE BY MOUTH EVERY DAY 90 Capsule 3    Synthroid 75 mcg tablet Take 1 Tablet by mouth Daily (before breakfast). 90 Tablet 1    fluvoxaMINE (LUVOX) 50 mg tablet Take 1 Tablet by mouth nightly. 180 Tablet 1    ascorbic acid, vitamin C, (VITAMIN C) 500 mg tablet Take 1,000 mg by mouth daily. CYANOCOBALAMIN, VITAMIN B-12, 5,000 Units by Does Not Apply route daily. albuterol (PROVENTIL HFA, VENTOLIN HFA, PROAIR HFA) 90 mcg/actuation inhaler Take 2 Puffs by inhalation every four (4) hours as needed for Wheezing for up to 30 days. Indications: bronchospasm (Patient not taking: Reported on 11/16/2022) 1 Each 3    lisdexamfetamine (Vyvanse) 50 mg cap Take 1 Capsule by mouth daily. Max Daily Amount: 50 mg. Indications: attention deficit disorder with hyperactivity (Patient not taking: Reported on 11/16/2022) 30 Capsule 0    simethicone (MYLICON) 80 mg chewable tablet Take 1 Tablet by mouth every six (6) hours as needed for Flatulence.  (Patient not taking: Reported on 11/16/2022) 30 Tablet 1    ibuprofen (MOTRIN) 200 mg tablet Take 200 mg by mouth. (Patient not taking: Reported on 11/16/2022)      guaiFENesin ER (MUCINEX) 600 mg ER tablet Take 1 Tablet by mouth two (2) times a day. (Patient not taking: Reported on 11/16/2022) 30 Tablet 2    busPIRone (BUSPAR) 5 mg tablet Take 1 Tablet by mouth two (2) times daily as needed for PRN Reason (Other) (anxiety). (Patient not taking: Reported on 11/16/2022) 180 Tablet 0    acetaminophen (TYLENOL) 500 mg capsule Take 1,000 mg by mouth as needed for Pain. (Patient not taking: Reported on 11/16/2022)      furosemide (Lasix) 20 mg tablet Take 1 Tablet by mouth daily as needed (edema). Indications: visible water retention (Patient not taking: Reported on 11/16/2022) 90 Tablet 1    desvenlafaxine succinate (PRISTIQ) 25 mg ER tablet Take 1 Tablet by mouth daily. (Patient not taking: Reported on 11/16/2022) 90 Tablet 1    fluticasone propionate (FLONASE) 50 mcg/actuation nasal spray 2 Sprays by Both Nostrils route daily.  (Patient not taking: Reported on 11/16/2022)       Allergies   Allergen Reactions    Macrobid [Nitrofurantoin Monohyd/M-Cryst] Palpitations    Meloxicam Other (comments)    Weed Pollen-Short Ragweed Rash    Naproxen Rash     Past Medical History:   Diagnosis Date    ADHD     Allergies     Anemia     Anxiety     GERD (gastroesophageal reflux disease)     Hyperthyroidism     hypothyroid    Urticaria      Past Surgical History:   Procedure Laterality Date    COLONOSCOPY N/A 3/18/2022    COLONOSCOPY WITH POSSIBLE POLYPECTOMY, EGD WITH POSSIBLE BIOPSY performed by Caleb Knight MD at [facility]    HX COLONOSCOPY  2009     Family History   Problem Relation Age of Onset    Alcohol abuse Father     Dementia Father     Alcohol abuse Brother     Heart Disease Mother         open heart surgery with valve replacement    Alcohol abuse Sister     Cancer Maternal Aunt         unknown    Cancer Maternal Uncle 79        prostate    Arthritis-rheumatoid Paternal Aunt     Cancer Paternal Uncle 79 prostate ? Alcohol abuse Paternal Grandfather      Social History     Tobacco Use    Smoking status: Never    Smokeless tobacco: Never   Substance Use Topics    Alcohol use: Yes     Alcohol/week: 1.0 standard drink     Types: 1 Shots of liquor per week     Comment: ususally 1 tequile shot per month       ROS    Patient-Reported Temperature: reports no fever       Kieran Hahn was evaluated through a patient-initiated, synchronous (real-time) audio only encounter. She (or guardian if applicable) is aware that it is a billable service, which includes applicable co-pays, with coverage as determined by her insurance carrier. This visit was conducted with the patient's (and/or Martita Aguilera guardian's) verbal consent. She has not had a related appointment within my department in the past 7 days or scheduled within the next 24 hours. The patient was located in a state where the provider was licensed to provide care. The patient was located at: Home: [place] [address]  The provider was located at:  Facility (Appt Department): [address] [id number]    Total Time: minutes: 11-20 minutes    Jenae Allen NP

## 2022-11-20 DIAGNOSIS — F90.9 ATTENTION DEFICIT HYPERACTIVITY DISORDER (ADHD), UNSPECIFIED ADHD TYPE: ICD-10-CM

## 2022-11-23 ENCOUNTER — TELEPHONE (OUTPATIENT)
Dept: FAMILY MEDICINE CLINIC | Age: 62
End: 2022-11-23

## 2022-11-23 NOTE — TELEPHONE ENCOUNTER
Phone call made to [phone] (home)  no answer ,so a message was left asking patient to call office back at [phone] at their convenience.

## 2022-11-23 NOTE — TELEPHONE ENCOUNTER
Kieran took the last vyvanse yesterday and states she can't get in today to do her UDS and leaves to go out of town on a business trip Friday. She wants to know if she can have enough vyvanse until she gets back and she can come in to do the urine test. Please advise.

## 2022-11-25 ENCOUNTER — DOCUMENTATION ONLY (OUTPATIENT)
Dept: FAMILY MEDICINE CLINIC | Age: 62
End: 2022-11-25

## 2022-11-25 NOTE — PROGRESS NOTES
Contacted patient's insurance company via cover my meds for prior authorization needed on Vyvanse 50 mg capsules. It has been Approved . From  11.25.22 12 am  until 11.25.23 12 am.  Reference Number for case [id number]. Samuel Quigley has been advised via fax.

## 2023-04-24 PROBLEM — D50.8 IRON DEFICIENCY ANEMIA SECONDARY TO INADEQUATE DIETARY IRON INTAKE: Status: ACTIVE | Noted: 2023-04-24

## 2023-05-25 RX ORDER — ASCORBIC ACID 500 MG
1000 TABLET ORAL DAILY
COMMUNITY

## 2023-05-25 RX ORDER — ERGOCALCIFEROL 1.25 MG/1
50000 CAPSULE ORAL
COMMUNITY
Start: 2023-04-21

## 2023-05-25 RX ORDER — FUROSEMIDE 20 MG/1
20 TABLET ORAL DAILY PRN
COMMUNITY
Start: 2023-01-03

## 2023-05-25 RX ORDER — IBUPROFEN 200 MG
200 TABLET ORAL
COMMUNITY

## 2023-05-25 RX ORDER — FLUTICASONE PROPIONATE 50 MCG
2 SPRAY, SUSPENSION (ML) NASAL DAILY
COMMUNITY

## 2023-05-25 RX ORDER — LEVOTHYROXINE SODIUM 0.07 MG/1
75 TABLET ORAL
COMMUNITY
Start: 2023-04-27

## 2023-05-25 RX ORDER — OMEPRAZOLE 40 MG/1
1 CAPSULE, DELAYED RELEASE ORAL DAILY
COMMUNITY
Start: 2022-10-17 | End: 2023-07-25 | Stop reason: SDUPTHER

## 2023-05-25 RX ORDER — BUSPIRONE HYDROCHLORIDE 5 MG/1
5 TABLET ORAL 2 TIMES DAILY PRN
COMMUNITY
Start: 2022-04-04

## 2023-07-03 ENCOUNTER — OFFICE VISIT (OUTPATIENT)
Age: 63
End: 2023-07-03

## 2023-07-03 VITALS
TEMPERATURE: 97.7 F | WEIGHT: 179 LBS | OXYGEN SATURATION: 97 % | HEIGHT: 66 IN | BODY MASS INDEX: 28.77 KG/M2 | DIASTOLIC BLOOD PRESSURE: 70 MMHG | HEART RATE: 82 BPM | SYSTOLIC BLOOD PRESSURE: 112 MMHG | RESPIRATION RATE: 20 BRPM

## 2023-07-03 DIAGNOSIS — L40.9 PSORIASIS: ICD-10-CM

## 2023-07-03 DIAGNOSIS — Z86.14 HX OF METHICILLIN RESISTANT STAPHYLOCOCCUS AUREUS: ICD-10-CM

## 2023-07-03 DIAGNOSIS — L29.9 ITCHY SCALP: ICD-10-CM

## 2023-07-03 DIAGNOSIS — D50.8 OTHER IRON DEFICIENCY ANEMIAS: ICD-10-CM

## 2023-07-03 DIAGNOSIS — F43.10 PTSD (POST-TRAUMATIC STRESS DISORDER): Primary | ICD-10-CM

## 2023-07-03 DIAGNOSIS — D50.8 IRON DEFICIENCY ANEMIA SECONDARY TO INADEQUATE DIETARY IRON INTAKE: ICD-10-CM

## 2023-07-03 PROCEDURE — 99214 OFFICE O/P EST MOD 30 MIN: CPT | Performed by: NURSE PRACTITIONER

## 2023-07-03 SDOH — ECONOMIC STABILITY: FOOD INSECURITY: WITHIN THE PAST 12 MONTHS, YOU WORRIED THAT YOUR FOOD WOULD RUN OUT BEFORE YOU GOT MONEY TO BUY MORE.: NEVER TRUE

## 2023-07-03 SDOH — ECONOMIC STABILITY: TRANSPORTATION INSECURITY
IN THE PAST 12 MONTHS, HAS THE LACK OF TRANSPORTATION KEPT YOU FROM MEDICAL APPOINTMENTS OR FROM GETTING MEDICATIONS?: NO

## 2023-07-03 SDOH — ECONOMIC STABILITY: HOUSING INSECURITY: IN THE LAST 12 MONTHS, HOW MANY PLACES HAVE YOU LIVED?: 1

## 2023-07-03 SDOH — ECONOMIC STABILITY: FOOD INSECURITY: WITHIN THE PAST 12 MONTHS, THE FOOD YOU BOUGHT JUST DIDN'T LAST AND YOU DIDN'T HAVE MONEY TO GET MORE.: NEVER TRUE

## 2023-07-03 SDOH — ECONOMIC STABILITY: INCOME INSECURITY: IN THE LAST 12 MONTHS, WAS THERE A TIME WHEN YOU WERE NOT ABLE TO PAY THE MORTGAGE OR RENT ON TIME?: NO

## 2023-07-03 SDOH — ECONOMIC STABILITY: TRANSPORTATION INSECURITY
IN THE PAST 12 MONTHS, HAS LACK OF TRANSPORTATION KEPT YOU FROM MEETINGS, WORK, OR FROM GETTING THINGS NEEDED FOR DAILY LIVING?: NO

## 2023-07-03 ASSESSMENT — SOCIAL DETERMINANTS OF HEALTH (SDOH): HOW HARD IS IT FOR YOU TO PAY FOR THE VERY BASICS LIKE FOOD, HOUSING, MEDICAL CARE, AND HEATING?: NOT HARD AT ALL

## 2023-07-04 LAB
IRON SATN MFR SERPL: 11 % (ref 20–50)
IRON SERPL-MCNC: 45 UG/DL (ref 35–150)
TIBC SERPL-MCNC: 405 UG/DL (ref 250–450)

## 2023-07-04 NOTE — PROGRESS NOTES
Subjective:     Lui Taylor is a 57 y.o. female who presents today with the following:  Chief Complaint   Patient presents with    Other     Scalp itching       Patient Active Problem List   Diagnosis    Allergic rhinitis    Vitamin D deficiency    Hypothyroid    Right ear pain    Herpes zoster with ophthalmic complication    MDD (major depressive disorder), recurrent severe, without psychosis (HCC)    Iron deficiency anemia    Acute on chronic anemia    Iron deficiency anemia secondary to inadequate dietary iron intake         COMPLIANT WITH MEDICATION:    Denies chest pain, dyspnea, palpitations, headache and blurred vision. Blood pressure normotensive. PTSD; Ms. Bri Orlando she endorses She is working with a  with her  dog Felecia Marr to become a service dog to help her with her PTSD. She also sees a therapist as needed. Itchy scalp; dx with psoriasis and spongiotic tissue along with MRSA on the crown of her scalp She is using Hibiclens and Tisol every other day with moderate results. She plans on establishing with a dermatologist     depression screening  at risk continue to monitor and support. abuse screening addressed denies    learning assessment addressed reviewed nurses notes    fall risk addressed not at risk    HM: addressed She declines today.     ROS:  Gen: denies fever, chills, fatigue, weight loss, weight gain  HEENT:denies blurry vision, nasal congestion, sore throat  Resp: denies dypsnea, cough, wheezing  CV: denies chest pain radiating to the jaws or arms, palpitations, lower extremity edema  Abd: denies nausea, vomiting, diarrhea, constipation  Neuro: denies numbness/tingling  Endo: denies polyuria, polydipsia, heat/cold intolerance  Heme: no lymphadenopathy    Allergies   Allergen Reactions    Nitrofurantoin Monohyd Macro Palpitations    Meloxicam Other (See Comments)     Reaction Type: Side Effect; Reaction(s): Bruising      Ambrosia Artemisiifolia (Ragweed) Skin Test Rash

## 2023-07-23 ENCOUNTER — HOSPITAL ENCOUNTER (EMERGENCY)
Facility: HOSPITAL | Age: 63
Discharge: HOME | End: 2023-07-24
Attending: EMERGENCY MEDICINE
Payer: MEDICARE

## 2023-07-23 DIAGNOSIS — L03.90 CELLULITIS, UNSPECIFIED CELLULITIS SITE: Primary | ICD-10-CM

## 2023-07-23 PROCEDURE — 99283 EMERGENCY DEPT VISIT LOW MDM: CPT

## 2023-07-23 RX ORDER — SULFAMETHOXAZOLE AND TRIMETHOPRIM 800; 160 MG/1; MG/1
1 TABLET ORAL 2 TIMES DAILY
Qty: 10 TABLET | Refills: 0 | Status: SHIPPED | OUTPATIENT
Start: 2023-07-23 | End: 2023-07-28

## 2023-07-23 ASSESSMENT — LIFESTYLE VARIABLES
HOW OFTEN DO YOU HAVE A DRINK CONTAINING ALCOHOL: NEVER
HOW MANY STANDARD DRINKS CONTAINING ALCOHOL DO YOU HAVE ON A TYPICAL DAY: PATIENT DOES NOT DRINK

## 2023-07-24 ENCOUNTER — TELEPHONE (OUTPATIENT)
Age: 63
End: 2023-07-24

## 2023-07-24 VITALS
OXYGEN SATURATION: 99 % | RESPIRATION RATE: 14 BRPM | TEMPERATURE: 98.1 F | BODY MASS INDEX: 29.17 KG/M2 | WEIGHT: 178 LBS | HEART RATE: 62 BPM | DIASTOLIC BLOOD PRESSURE: 87 MMHG | SYSTOLIC BLOOD PRESSURE: 142 MMHG

## 2023-07-24 NOTE — TELEPHONE ENCOUNTER
Pt was seen in ER last night and given antibiotic for infection in scalp. States she was given sulfamethoxazole-trimethoprim (BACTRIM DS) 800-160 MG per tablet but knows from previous scalp infections that this one does not work and she needs to be prescribed one she was on prior. Vancomycin? Would like to know if PCP will prescribe. Told she would likely need visit. Please advise.

## 2023-07-24 NOTE — ED PROVIDER NOTES
EMERGENCY DEPARTMENT HISTORY AND PHYSICAL EXAM      Date: 7/23/2023  Patient Name: Tricia Elam    History of Presenting Illness     Chief Complaint   Patient presents with    Hair/Scalp Problem     Infection         History Provided By: Patient    HPI: Tricia Elam, 57 y.o. female with PMHx as noted below presents the emergency department with complaints of recurrent scalp infections. Patient states been ongoing issue and it is unclear what continues to cause it. She has not been able to follow-up with dermatology yet. Patient states she had been prescribed antibiotic for a recent infection however was told that it may have been MRSA as it did not completely cure it. Patient otherwise denying any fevers or systemic symptoms at this time. She did note some purulent drainage from the area couple days ago      PCP: ANABEL Khoury NP    No current facility-administered medications for this encounter. Current Outpatient Medications   Medication Sig Dispense Refill    sulfamethoxazole-trimethoprim (BACTRIM DS) 800-160 MG per tablet Take 1 tablet by mouth 2 times daily for 5 days 10 tablet 0    lisdexamfetamine (VYVANSE) 50 MG capsule Take 1 capsule by mouth daily for 30 days.  Max Daily Amount: 50 mg 30 capsule 0    acetaminophen (TYLENOL) 500 MG CAPS capsule Take 2 capsules by mouth as needed      ascorbic acid (VITAMIN C) 500 MG tablet Take 2 tablets by mouth daily      busPIRone (BUSPAR) 5 MG tablet Take 1 tablet by mouth 2 times daily as needed      ergocalciferol (ERGOCALCIFEROL) 1.25 MG (92306 UT) capsule Take 1 capsule by mouth every 7 days      fluticasone (FLONASE) 50 MCG/ACT nasal spray 2 sprays by Nasal route daily      furosemide (LASIX) 20 MG tablet Take 1 tablet by mouth daily as needed      ibuprofen (ADVIL;MOTRIN) 200 MG tablet Take 1 tablet by mouth      levothyroxine (SYNTHROID) 75 MCG tablet Take 1 tablet by mouth every morning (before breakfast)      omeprazole (PRILOSEC) 40

## 2023-07-25 ENCOUNTER — TELEMEDICINE (OUTPATIENT)
Age: 63
End: 2023-07-25
Payer: MEDICARE

## 2023-07-25 DIAGNOSIS — L29.8 PRURITIC DERMATOSIS OF SCALP: ICD-10-CM

## 2023-07-25 DIAGNOSIS — L98.9 DISORDER OF THE SKIN AND SUBCUTANEOUS TISSUE, UNSPECIFIED: Primary | ICD-10-CM

## 2023-07-25 DIAGNOSIS — L40.9 PSORIASIS: ICD-10-CM

## 2023-07-25 DIAGNOSIS — D50.8 OTHER IRON DEFICIENCY ANEMIAS: ICD-10-CM

## 2023-07-25 PROCEDURE — 99214 OFFICE O/P EST MOD 30 MIN: CPT | Performed by: NURSE PRACTITIONER

## 2023-07-25 RX ORDER — OMEPRAZOLE 40 MG/1
40 CAPSULE, DELAYED RELEASE ORAL DAILY
Qty: 90 CAPSULE | Refills: 1
Start: 2023-07-25

## 2023-07-25 RX ORDER — VANCOMYCIN HYDROCHLORIDE 125 MG/1
125 CAPSULE ORAL 4 TIMES DAILY
Qty: 40 CAPSULE | Refills: 0 | Status: SHIPPED | OUTPATIENT
Start: 2023-07-25 | End: 2023-08-04

## 2023-07-25 NOTE — PROGRESS NOTES
Chief Complaint   Patient presents with    Skin Problem     Scalp followup [facility] ER Sunday       Wants to discuss  medication for scalp

## 2023-07-27 NOTE — PROGRESS NOTES
Tyrone Phillip (:  1960) is a Established patient, presenting virtually for evaluation of the following:  I reviewed the encounter from the ER. Ms. Slava Olson endorses the longstanding skin condition responds well vancomycin. She is requesting a referral to dermatology for management. Assessment & Plan   Below is the assessment and plan developed based on review of pertinent history, physical exam, labs, studies, and medications. 1. Disorder of the skin and subcutaneous tissue, unspecified  -     vancomycin (VANCOCIN) 125 MG capsule; Take 1 capsule by mouth 4 times daily for 10 days, Disp-40 capsule, R-0Normal  -     External Referral To Dermatology  2. Pruritic dermatosis of scalp  -     External Referral To Dermatology  3. Psoriasis  -     External Referral To Dermatology  4. Other iron deficiency anemias    No follow-ups on file.        Subjective   HPI  Review of Systems   Pertinent items are noted in the HPI    Objective   Patient-Reported Vitals  No data recorded     Physical Exam      Constitutional: [x] Appears well-developed and well-nourished [x] No apparent distress      [] Abnormal -     Mental status: [x] Alert and awake  [x] Oriented to person/place/time [x] Able to follow commands    [] Abnormal -     Eyes:   EOM    [x]  Normal    [] Abnormal -   Sclera  [x]  Normal    [] Abnormal -          Discharge [x]  None visible   [] Abnormal -     HENT: [x] Normocephalic, atraumatic  [] Abnormal -   [x] Mouth/Throat: Mucous membranes are moist    External Ears [x] Normal  [] Abnormal -    Neck: [x] No visualized mass [] Abnormal -     Pulmonary/Chest: [x] Respiratory effort normal   [x] No visualized signs of difficulty breathing or respiratory distress        [] Abnormal -      Musculoskeletal:   [x] Normal gait with no signs of ataxia         [x] Normal range of motion of neck        [] Abnormal -     Neurological:        [x] No Facial Asymmetry (Cranial nerve 7 motor function) (limited exam

## 2023-08-01 DIAGNOSIS — F90.2 ATTENTION DEFICIT HYPERACTIVITY DISORDER (ADHD), COMBINED TYPE: ICD-10-CM

## 2023-08-01 NOTE — TELEPHONE ENCOUNTER
CRISTIANE Lowe Ela / Pharmacist to confirm of not receiving a refill request, 07/25/2023 for Prilosec 40 mg, # 90 1 refill? A refill was done, but does not seem to have been sent. Per Mayank, no refill received.

## 2023-08-01 NOTE — TELEPHONE ENCOUNTER
lisdexamfetamine (VYVANSE) 50 MG capsule Nadine Kelly, APRN - NP]         omeprazole (PRILOSEC) 40 MG delayed release capsule Nadine Kelly APRN - NP]

## 2023-08-01 NOTE — TELEPHONE ENCOUNTER
Last OV 7/25/2023    Requested Prescriptions     Pending Prescriptions Disp Refills    lisdexamfetamine (VYVANSE) 50 MG capsule 30 capsule 0     Sig: Take 1 capsule by mouth daily for 30 days.  Max Daily Amount: 50 mg    omeprazole (PRILOSEC) 40 MG delayed release capsule 90 capsule 1     Sig: Take 1 capsule by mouth daily

## 2023-08-06 RX ORDER — OMEPRAZOLE 40 MG/1
40 CAPSULE, DELAYED RELEASE ORAL DAILY
Qty: 90 CAPSULE | Refills: 1 | Status: SHIPPED | OUTPATIENT
Start: 2023-08-06

## 2023-08-08 ENCOUNTER — TELEPHONE (OUTPATIENT)
Age: 63
End: 2023-08-08

## 2023-08-08 NOTE — TELEPHONE ENCOUNTER
Patient wanted to know if it's okay to hold off on her iron shot (due to insurance worries). Please call back.

## 2023-08-17 RX ORDER — OMEPRAZOLE 40 MG/1
40 CAPSULE, DELAYED RELEASE ORAL DAILY
Qty: 90 CAPSULE | Refills: 1 | Status: CANCELLED | OUTPATIENT
Start: 2023-08-17

## 2023-08-21 ENCOUNTER — PATIENT MESSAGE (OUTPATIENT)
Age: 63
End: 2023-08-21

## 2023-08-21 NOTE — TELEPHONE ENCOUNTER
From: Yessenia Monroe  To: Say Palacios  Sent: 8/21/2023 7:38 AM EDT  Subject: Kavita Wilkins patient assistance program    Caio Marshall,  Could you please double check to see if the patient assistance program application for Vyvanse made by Kavita Wilkins has been faxed please? The prescription is sitting at [facility] and I am needing to get back on the Vyvance. They are waiting for your portion to be back, able to approve it. Here is the fax number.  [phone]  Thank you,  Parul Oliveira

## 2023-09-05 RX ORDER — LEVOTHYROXINE SODIUM 0.07 MG/1
75 TABLET ORAL DAILY
Qty: 30 TABLET | Refills: 5 | Status: SHIPPED | OUTPATIENT
Start: 2023-09-05

## 2023-09-11 ENCOUNTER — TELEPHONE (OUTPATIENT)
Age: 63
End: 2023-09-11

## 2023-09-11 DIAGNOSIS — D50.9 IRON DEFICIENCY ANEMIA, UNSPECIFIED IRON DEFICIENCY ANEMIA TYPE: Primary | ICD-10-CM

## 2023-09-11 NOTE — TELEPHONE ENCOUNTER
Patient would like to discuss lab results and if a possible iron infusion is needed. Please call back.

## 2023-09-12 NOTE — TELEPHONE ENCOUNTER
9/12 6604: Call placed to patient. LM to notify that labs from 7/13 show improvement in iron sat from previous but don't include ferritin which would give us more information regarding if she needs IV iron. Advised that we can recheck labs if she would like CBC, iron and TIBC, ferritin.

## 2023-09-13 ENCOUNTER — TELEPHONE (OUTPATIENT)
Age: 63
End: 2023-09-13

## 2023-09-13 NOTE — TELEPHONE ENCOUNTER
Patient called, stated she had received a message from Dina Giron NP.  HIPAA verified. Per patient Red Chin NP wanted to know if she would like to have labs done, due to feeling decrease in energy, and constant eating of ice. Patient feels this would be a good idea. Arrangements made for patient to come to office to  lab orders, then return to [facility] for labs to be drawn later this week. Thanked patient for call.

## 2023-09-19 ENCOUNTER — HOSPITAL ENCOUNTER (OUTPATIENT)
Facility: HOSPITAL | Age: 63
Discharge: HOME | End: 2023-09-22

## 2023-09-20 ENCOUNTER — OFFICE VISIT (OUTPATIENT)
Age: 63
End: 2023-09-20

## 2023-09-20 VITALS
BODY MASS INDEX: 29.57 KG/M2 | SYSTOLIC BLOOD PRESSURE: 144 MMHG | RESPIRATION RATE: 16 BRPM | HEIGHT: 66 IN | WEIGHT: 184 LBS | DIASTOLIC BLOOD PRESSURE: 80 MMHG | OXYGEN SATURATION: 92 % | HEART RATE: 94 BPM | TEMPERATURE: 98.1 F

## 2023-09-20 DIAGNOSIS — F90.2 ATTENTION DEFICIT HYPERACTIVITY DISORDER (ADHD), COMBINED TYPE: ICD-10-CM

## 2023-09-20 DIAGNOSIS — Z00.00 ENCOUNTER FOR MEDICARE ANNUAL WELLNESS EXAM: Primary | ICD-10-CM

## 2023-09-20 DIAGNOSIS — R30.0 DYSURIA: ICD-10-CM

## 2023-09-20 DIAGNOSIS — F33.2 MAJOR DEPRESSIVE DISORDER, RECURRENT SEVERE WITHOUT PSYCHOTIC FEATURES (HCC): ICD-10-CM

## 2023-09-20 RX ORDER — FLUCONAZOLE 150 MG/1
150 TABLET ORAL ONCE
Qty: 1 TABLET | Refills: 0 | Status: SHIPPED | OUTPATIENT
Start: 2023-09-20 | End: 2023-09-20

## 2023-09-20 ASSESSMENT — PATIENT HEALTH QUESTIONNAIRE - PHQ9
9. THOUGHTS THAT YOU WOULD BE BETTER OFF DEAD, OR OF HURTING YOURSELF: 0
8. MOVING OR SPEAKING SO SLOWLY THAT OTHER PEOPLE COULD HAVE NOTICED. OR THE OPPOSITE, BEING SO FIGETY OR RESTLESS THAT YOU HAVE BEEN MOVING AROUND A LOT MORE THAN USUAL: 0
10. IF YOU CHECKED OFF ANY PROBLEMS, HOW DIFFICULT HAVE THESE PROBLEMS MADE IT FOR YOU TO DO YOUR WORK, TAKE CARE OF THINGS AT HOME, OR GET ALONG WITH OTHER PEOPLE: 0
4. FEELING TIRED OR HAVING LITTLE ENERGY: 0
SUM OF ALL RESPONSES TO PHQ QUESTIONS 1-9: 0
5. POOR APPETITE OR OVEREATING: 0
3. TROUBLE FALLING OR STAYING ASLEEP: 0
SUM OF ALL RESPONSES TO PHQ9 QUESTIONS 1 & 2: 0
2. FEELING DOWN, DEPRESSED OR HOPELESS: 0
6. FEELING BAD ABOUT YOURSELF - OR THAT YOU ARE A FAILURE OR HAVE LET YOURSELF OR YOUR FAMILY DOWN: 0
1. LITTLE INTEREST OR PLEASURE IN DOING THINGS: 0
7. TROUBLE CONCENTRATING ON THINGS, SUCH AS READING THE NEWSPAPER OR WATCHING TELEVISION: 0
SUM OF ALL RESPONSES TO PHQ QUESTIONS 1-9: 0

## 2023-09-20 ASSESSMENT — LIFESTYLE VARIABLES
HOW OFTEN DO YOU HAVE A DRINK CONTAINING ALCOHOL: MONTHLY OR LESS
HOW MANY STANDARD DRINKS CONTAINING ALCOHOL DO YOU HAVE ON A TYPICAL DAY: 1 OR 2

## 2023-09-20 NOTE — PATIENT INSTRUCTIONS
included within your After Visit Summary for your review. Other Preventive Recommendations:    A preventive eye exam performed by an eye specialist is recommended every 1-2 years to screen for glaucoma; cataracts, macular degeneration, and other eye disorders. A preventive dental visit is recommended every 6 months. Try to get at least 150 minutes of exercise per week or 10,000 steps per day on a pedometer . Order or download the FREE \"Exercise & Physical Activity: Your Everyday Guide\" from [employer]. Call 1-666.644.4746 or search [employer] online. You need 0139-8201 mg of calcium and 1187-1860 IU of vitamin D per day. It is possible to meet your calcium requirement with diet alone, but a vitamin D supplement is usually necessary to meet this goal.  When exposed to the sun, use a sunscreen that protects against both UVA and UVB radiation with an SPF of 30 or greater. Reapply every 2 to 3 hours or after sweating, drying off with a towel, or swimming. Always wear a seat belt when traveling in a car. Always wear a helmet when riding a bicycle or motorcycle.

## 2023-09-21 LAB
BASOPHILS # BLD: 0 K/UL (ref 0–0.1)
BASOPHILS NFR BLD: 1 % (ref 0–1)
DIFFERENTIAL METHOD BLD: NORMAL
EOSINOPHIL # BLD: 0.1 K/UL (ref 0–0.4)
EOSINOPHIL NFR BLD: 1 % (ref 0–7)
ERYTHROCYTE [DISTWIDTH] IN BLOOD BY AUTOMATED COUNT: 12.5 % (ref 11.5–14.5)
FERRITIN SERPL-MCNC: 8 NG/ML (ref 26–388)
HCT VFR BLD AUTO: 39.4 % (ref 35–47)
HGB BLD-MCNC: 12 G/DL (ref 11.5–16)
IMM GRANULOCYTES # BLD AUTO: 0 K/UL (ref 0–0.04)
IMM GRANULOCYTES NFR BLD AUTO: 0 % (ref 0–0.5)
IRON SATN MFR SERPL: 6 % (ref 20–50)
IRON SERPL-MCNC: 27 UG/DL (ref 35–150)
LYMPHOCYTES # BLD: 1.1 K/UL (ref 0.8–3.5)
LYMPHOCYTES NFR BLD: 19 % (ref 12–49)
MCH RBC QN AUTO: 28.1 PG (ref 26–34)
MCHC RBC AUTO-ENTMCNC: 30.5 G/DL (ref 30–36.5)
MCV RBC AUTO: 92.3 FL (ref 80–99)
MONOCYTES # BLD: 0.4 K/UL (ref 0–1)
MONOCYTES NFR BLD: 7 % (ref 5–13)
NEUTS SEG # BLD: 4.2 K/UL (ref 1.8–8)
NEUTS SEG NFR BLD: 72 % (ref 32–75)
NRBC # BLD: 0 K/UL (ref 0–0.01)
NRBC BLD-RTO: 0 PER 100 WBC
PLATELET # BLD AUTO: 328 K/UL (ref 150–400)
PMV BLD AUTO: 11.3 FL (ref 8.9–12.9)
RBC # BLD AUTO: 4.27 M/UL (ref 3.8–5.2)
TIBC SERPL-MCNC: 440 UG/DL (ref 250–450)
WBC # BLD AUTO: 5.9 K/UL (ref 3.6–11)

## 2023-09-22 ENCOUNTER — TELEPHONE (OUTPATIENT)
Age: 63
End: 2023-09-22

## 2023-09-22 NOTE — TELEPHONE ENCOUNTER
PC from [facility], she is questioning if we could call to add on a Ferritin to pt's lab. She was complaining of low energy and eating lots of ice, signs of anemia. The ferritin lab result accidentally overlooked, no order needed, thanks.

## 2023-09-25 ENCOUNTER — OFFICE VISIT (OUTPATIENT)
Age: 63
End: 2023-09-25

## 2023-09-25 VITALS
DIASTOLIC BLOOD PRESSURE: 80 MMHG | RESPIRATION RATE: 18 BRPM | OXYGEN SATURATION: 100 % | HEART RATE: 72 BPM | TEMPERATURE: 98.2 F | HEIGHT: 66 IN | SYSTOLIC BLOOD PRESSURE: 132 MMHG | BODY MASS INDEX: 29.63 KG/M2 | WEIGHT: 184.4 LBS

## 2023-09-25 DIAGNOSIS — D50.9 IRON DEFICIENCY ANEMIA, UNSPECIFIED IRON DEFICIENCY ANEMIA TYPE: Primary | ICD-10-CM

## 2023-09-25 RX ORDER — EPINEPHRINE 1 MG/ML
0.3 INJECTION, SOLUTION, CONCENTRATE INTRAVENOUS PRN
Status: CANCELLED | OUTPATIENT
Start: 2023-09-28

## 2023-09-25 RX ORDER — SODIUM CHLORIDE 9 MG/ML
INJECTION, SOLUTION INTRAVENOUS CONTINUOUS
Status: CANCELLED | OUTPATIENT
Start: 2023-09-28

## 2023-09-25 RX ORDER — SODIUM CHLORIDE 9 MG/ML
5-250 INJECTION, SOLUTION INTRAVENOUS PRN
Status: CANCELLED | OUTPATIENT
Start: 2023-09-28

## 2023-09-25 RX ORDER — ACETAMINOPHEN 325 MG/1
650 TABLET ORAL
Status: CANCELLED | OUTPATIENT
Start: 2023-09-28

## 2023-09-25 RX ORDER — ALBUTEROL SULFATE 90 UG/1
4 AEROSOL, METERED RESPIRATORY (INHALATION) PRN
Status: CANCELLED | OUTPATIENT
Start: 2023-09-28

## 2023-09-25 RX ORDER — ONDANSETRON 2 MG/ML
8 INJECTION INTRAMUSCULAR; INTRAVENOUS
Status: CANCELLED | OUTPATIENT
Start: 2023-09-28

## 2023-09-25 RX ORDER — HEPARIN SODIUM (PORCINE) LOCK FLUSH IV SOLN 100 UNIT/ML 100 UNIT/ML
500 SOLUTION INTRAVENOUS PRN
Status: CANCELLED | OUTPATIENT
Start: 2023-09-28

## 2023-09-25 RX ORDER — SODIUM CHLORIDE 0.9 % (FLUSH) 0.9 %
5-40 SYRINGE (ML) INJECTION PRN
Status: CANCELLED | OUTPATIENT
Start: 2023-09-28

## 2023-09-25 RX ORDER — FAMOTIDINE 10 MG/ML
20 INJECTION, SOLUTION INTRAVENOUS
Status: CANCELLED | OUTPATIENT
Start: 2023-09-28

## 2023-09-25 RX ORDER — DIPHENHYDRAMINE HYDROCHLORIDE 50 MG/ML
50 INJECTION INTRAMUSCULAR; INTRAVENOUS
Status: CANCELLED | OUTPATIENT
Start: 2023-09-28

## 2023-09-25 ASSESSMENT — PATIENT HEALTH QUESTIONNAIRE - PHQ9
SUM OF ALL RESPONSES TO PHQ QUESTIONS 1-9: 2
2. FEELING DOWN, DEPRESSED OR HOPELESS: 1
SUM OF ALL RESPONSES TO PHQ QUESTIONS 1-9: 2
1. LITTLE INTEREST OR PLEASURE IN DOING THINGS: 1
SUM OF ALL RESPONSES TO PHQ9 QUESTIONS 1 & 2: 2

## 2023-09-25 ASSESSMENT — PAIN SCALES - GENERAL: PAINLEVEL_OUTOF10: 0

## 2023-09-25 NOTE — PROGRESS NOTES
Kimberly Dorsey is a 70 y.o. female here for follow up for anemia. Patient is back to eating ice. Labs today, provided order forms   Iron infusions, notified Sofia Palacio in [place] of need to schedule with patient.

## 2023-09-25 NOTE — PROGRESS NOTES
[employer]  Medical Oncology at [place]  [phone]    Hematology / Oncology Consult    Reason for Visit:   Dayanna Brooks is a 70 y.o. female who is seen in consultation at the request of John Goel NP for anemia. History of Present Illness:   Dear colleague,       Just a brief indication regarding our mutual patient Mrs. Eyal Patel. As you recall this 75-year-old female carries a history of anemia with features of iron deficiency. The patient has had a longstanding history of menorrhagia in her youth. She has been on nonsteroidals and utilizes PPIs. The patient had a negative bidirectional endoscopic evaluation. She did not have a video capsule swallow. Her stool guaiac testing for occult blood was negative x2. She has had no alarm symptoms. Her appetite is appropriate her weight is actually increasing she denies any abdominal or pelvic pain no dyspepsia dysphagia or odynophagia. The patient does describe fatigue and asthenia her recent ferritin level was less than 10. The patient is now having the pica phenomena of ice eating once again this has been closely associated with a prevailing iron deficiency for this patient. She last received iron infusion in this clinic roughly 6 months ago. She reports no veto hematochezia is noted. I have discussed with her the need to have a influenza and COVID-vaccine in mid October to be followed by RSV. I have strongly recommended a Shingrix series for this patient who has had zoster ophthalmicus several times.   Past Medical History:   Diagnosis Date    ADHD     Allergies     Anemia     Anxiety     GERD (gastroesophageal reflux disease)     Hyperthyroidism     hypothyroid    Urticaria       Past Surgical History:   Procedure Laterality Date    COLONOSCOPY  2009    COLONOSCOPY N/A 3/18/2022    COLONOSCOPY WITH POSSIBLE POLYPECTOMY, EGD WITH POSSIBLE BIOPSY performed by Fredrick Millan MD at [facility] History     Tobacco

## 2023-09-26 ENCOUNTER — TELEPHONE (OUTPATIENT)
Age: 63
End: 2023-09-26

## 2023-09-26 NOTE — TELEPHONE ENCOUNTER
Sinarayna would like to talk to [employer] about a personal matter that was discussed the other day. She states she has some information she would like to pass along to [employer]. Call her back at your earliest convenience.

## 2023-09-26 NOTE — TELEPHONE ENCOUNTER
Mrs France Lopez left a VM on answering machine over night. Patient stated she has been in touch with her Hematologist in [address] [place] [address]. She has been treaded by this doctor in the past for her anemia. Last iron infusion by the [place] hematologist lasted 9 months.   She would like to cancel her appointment for iron infusions and have them in [place] by her [facility].

## 2023-09-27 ENCOUNTER — TELEPHONE (OUTPATIENT)
Age: 63
End: 2023-09-27

## 2023-09-27 DIAGNOSIS — F90.2 ATTENTION DEFICIT HYPERACTIVITY DISORDER (ADHD), COMBINED TYPE: ICD-10-CM

## 2023-09-27 LAB — DRUGS UR: NORMAL

## 2023-09-27 RX ORDER — SODIUM CHLORIDE 9 MG/ML
INJECTION, SOLUTION INTRAVENOUS CONTINUOUS
OUTPATIENT
Start: 2023-09-29

## 2023-09-27 RX ORDER — LISDEXAMFETAMINE DIMESYLATE CAPSULES 50 MG/1
50 CAPSULE ORAL DAILY
Qty: 30 CAPSULE | Refills: 0 | Status: SHIPPED | OUTPATIENT
Start: 2023-09-27 | End: 2023-10-27

## 2023-09-27 RX ORDER — FAMOTIDINE 10 MG/ML
20 INJECTION, SOLUTION INTRAVENOUS
OUTPATIENT
Start: 2023-09-29

## 2023-09-27 RX ORDER — EPINEPHRINE 1 MG/ML
0.3 INJECTION, SOLUTION, CONCENTRATE INTRAVENOUS PRN
OUTPATIENT
Start: 2023-09-29

## 2023-09-27 RX ORDER — ALBUTEROL SULFATE 90 UG/1
4 AEROSOL, METERED RESPIRATORY (INHALATION) PRN
OUTPATIENT
Start: 2023-09-29

## 2023-09-27 RX ORDER — SODIUM CHLORIDE 9 MG/ML
5-250 INJECTION, SOLUTION INTRAVENOUS PRN
OUTPATIENT
Start: 2023-09-29

## 2023-09-27 RX ORDER — ACETAMINOPHEN 325 MG/1
650 TABLET ORAL
OUTPATIENT
Start: 2023-09-29

## 2023-09-27 RX ORDER — ONDANSETRON 2 MG/ML
8 INJECTION INTRAMUSCULAR; INTRAVENOUS
OUTPATIENT
Start: 2023-09-29

## 2023-09-27 RX ORDER — DIPHENHYDRAMINE HYDROCHLORIDE 50 MG/ML
50 INJECTION INTRAMUSCULAR; INTRAVENOUS
OUTPATIENT
Start: 2023-09-29

## 2023-09-27 RX ORDER — HEPARIN SODIUM (PORCINE) LOCK FLUSH IV SOLN 100 UNIT/ML 100 UNIT/ML
500 SOLUTION INTRAVENOUS PRN
OUTPATIENT
Start: 2023-09-29

## 2023-09-27 RX ORDER — SODIUM CHLORIDE 0.9 % (FLUSH) 0.9 %
5-40 SYRINGE (ML) INJECTION PRN
OUTPATIENT
Start: 2023-09-29

## 2023-10-02 ENCOUNTER — TELEPHONE (OUTPATIENT)
Age: 63
End: 2023-10-02

## 2023-10-02 NOTE — TELEPHONE ENCOUNTER
PC VM to rt the phone call. Per Kevin Maya, she has assistance with her medication, so is checking to see if the Lisdexamfeta cap 50 mg is covered as well?

## 2023-10-02 NOTE — TELEPHONE ENCOUNTER
Pt does have that assistant for her  lisdexamfetamine medication. Rodrigue Alonzo sends it in, she then picks up from the pharmacy. She has already picked up for the 09/2023 order, thanks. Pt also was questioning if Rodrigue Alonzo did the urine test for sugar in her urine, did not see with the rest of her results? Per Rodrigue Alonzo, she would had more less checked that with a [id number],. Last  one done 2020, normal.  Pt stated OK, will do at a later time. She is also going out of town for her next Iron Infussion, she can get the Monoferric 1,000 mg to last longer. The previous did not last long enough. Tony Murguia informed of all verbally, stated OK thanks.

## 2023-10-04 NOTE — TELEPHONE ENCOUNTER
Called patient twice before appointment with DR Eva Jack office. Left VM, No return call at this time.

## 2023-10-09 RX ORDER — VENLAFAXINE HYDROCHLORIDE 75 MG/1
75 CAPSULE, EXTENDED RELEASE ORAL DAILY
Qty: 30 CAPSULE | Refills: 3 | Status: SHIPPED | OUTPATIENT
Start: 2023-10-09

## 2023-10-30 DIAGNOSIS — F90.2 ATTENTION DEFICIT HYPERACTIVITY DISORDER (ADHD), COMBINED TYPE: ICD-10-CM

## 2023-10-31 RX ORDER — LISDEXAMFETAMINE DIMESYLATE CAPSULES 50 MG/1
50 CAPSULE ORAL DAILY
Qty: 30 CAPSULE | Refills: 0 | Status: SHIPPED | OUTPATIENT
Start: 2023-10-31 | End: 2023-11-01 | Stop reason: CLARIF

## 2023-11-01 ENCOUNTER — TELEPHONE (OUTPATIENT)
Age: 63
End: 2023-11-01

## 2023-11-01 DIAGNOSIS — F90.2 ATTENTION DEFICIT HYPERACTIVITY DISORDER (ADHD), COMBINED TYPE: ICD-10-CM

## 2023-11-01 RX ORDER — LISDEXAMFETAMINE DIMESYLATE CAPSULES 50 MG/1
50 CAPSULE ORAL DAILY
Qty: 30 CAPSULE | Refills: 0 | OUTPATIENT
Start: 2023-11-01 | End: 2023-12-01

## 2023-11-01 RX ORDER — LISDEXAMFETAMINE DIMESYLATE CAPSULES 50 MG/1
50 CAPSULE ORAL DAILY
Qty: 30 CAPSULE | Refills: 0 | Status: SHIPPED | OUTPATIENT
Start: 2023-11-01 | End: 2023-12-01

## 2023-11-01 NOTE — TELEPHONE ENCOUNTER
Pt states she needs lisdexamfetamine (VYVANSE) 50 MG capsule sent to [address] [place] [address] [address] [phone] - F [phone] it was sent to [address] and she does not use them any more.

## 2023-11-01 NOTE — TELEPHONE ENCOUNTER
Ritchie New states there are 2 different diagnostic codes for the RX lisdexamfetamine 50 mg. Please call [phone] to give the correct code.

## 2023-11-02 PROBLEM — H92.01 RIGHT EAR PAIN: Status: ACTIVE | Noted: 2020-09-24

## 2023-11-02 PROBLEM — B02.30 HERPES ZOSTER OF EYE: Status: ACTIVE | Noted: 2020-09-24

## 2023-11-02 PROBLEM — M70.51 PES ANSERINUS BURSITIS OF RIGHT KNEE: Status: ACTIVE | Noted: 2022-09-19

## 2023-11-02 PROBLEM — M17.11 OSTEOARTHRITIS OF RIGHT KNEE: Status: ACTIVE | Noted: 2022-09-19

## 2023-11-02 PROBLEM — M25.561 RIGHT KNEE PAIN: Status: ACTIVE | Noted: 2022-09-19

## 2023-11-17 ENCOUNTER — TELEPHONE (OUTPATIENT)
Age: 63
End: 2023-11-17

## 2023-11-17 RX ORDER — VENLAFAXINE HYDROCHLORIDE 75 MG/1
75 CAPSULE, EXTENDED RELEASE ORAL DAILY
Qty: 30 CAPSULE | Refills: 3 | Status: SHIPPED | OUTPATIENT
Start: 2023-11-17

## 2023-11-17 NOTE — TELEPHONE ENCOUNTER
PC SW Pt to inform of Samreen's advice. Per pt she is sure that it is the diverticulitis. She is having some pain, fever, chills and scalp issues which is a sign of her having a full blown case of. Kee Rivera normally gives her the two requested medications, knocks it right out. She was informed Kee Rivera is off today, all providers has left for the day. I will send out her response, not sure if any return response over the weekend, but if symptoms becomes critical, then please to the ER or urgent care. If she does get through the weekend, can schedule with Kee Rivera on next week. She stated OK of understanding and thanks.

## 2023-11-22 ENCOUNTER — OFFICE VISIT (OUTPATIENT)
Age: 63
End: 2023-11-22
Payer: COMMERCIAL

## 2023-11-22 VITALS
BODY MASS INDEX: 30.7 KG/M2 | SYSTOLIC BLOOD PRESSURE: 130 MMHG | DIASTOLIC BLOOD PRESSURE: 84 MMHG | OXYGEN SATURATION: 97 % | HEIGHT: 66 IN | TEMPERATURE: 97.3 F | WEIGHT: 191 LBS | HEART RATE: 93 BPM | RESPIRATION RATE: 20 BRPM

## 2023-11-22 DIAGNOSIS — J03.90 TONSILLITIS: Primary | ICD-10-CM

## 2023-11-22 DIAGNOSIS — R30.0 DYSURIA: ICD-10-CM

## 2023-11-22 LAB
BILIRUBIN, URINE, POC: NEGATIVE
BLOOD URINE, POC: NEGATIVE
EXP DATE SOLUTION: NORMAL
EXP DATE SWAB: NORMAL
EXPIRATION DATE: NORMAL
GLUCOSE URINE, POC: NEGATIVE
GROUP A STREP ANTIGEN, POC: NEGATIVE
KETONES, URINE, POC: NEGATIVE
LEUKOCYTE ESTERASE, URINE, POC: ABNORMAL
LOT NUMBER POC: NORMAL
LOT NUMBER SOLUTION: NORMAL
LOT NUMBER SWAB: NORMAL
NITRITE, URINE, POC: NEGATIVE
PH, URINE, POC: 6 (ref 4.6–8)
PROTEIN,URINE, POC: NEGATIVE
SARS-COV-2 RNA, POC: NEGATIVE
SPECIFIC GRAVITY, URINE, POC: 1.01 (ref 1–1.03)
URINALYSIS CLARITY, POC: ABNORMAL
URINALYSIS COLOR, POC: YELLOW
UROBILINOGEN, POC: ABNORMAL
VALID INTERNAL CONTROL, POC: YES

## 2023-11-22 PROCEDURE — 81001 URINALYSIS AUTO W/SCOPE: CPT

## 2023-11-22 PROCEDURE — 87880 STREP A ASSAY W/OPTIC: CPT

## 2023-11-22 PROCEDURE — G8484 FLU IMMUNIZE NO ADMIN: HCPCS

## 2023-11-22 PROCEDURE — G8417 CALC BMI ABV UP PARAM F/U: HCPCS

## 2023-11-22 PROCEDURE — 87635 SARS-COV-2 COVID-19 AMP PRB: CPT

## 2023-11-22 PROCEDURE — 1090F PRES/ABSN URINE INCON ASSESS: CPT

## 2023-11-22 PROCEDURE — 1123F ACP DISCUSS/DSCN MKR DOCD: CPT

## 2023-11-22 PROCEDURE — 99214 OFFICE O/P EST MOD 30 MIN: CPT

## 2023-11-22 PROCEDURE — 3017F COLORECTAL CA SCREEN DOC REV: CPT

## 2023-11-22 PROCEDURE — G8427 DOCREV CUR MEDS BY ELIG CLIN: HCPCS

## 2023-11-22 PROCEDURE — 1036F TOBACCO NON-USER: CPT

## 2023-11-22 PROCEDURE — G8400 PT W/DXA NO RESULTS DOC: HCPCS

## 2023-11-22 RX ORDER — AMOXICILLIN 500 MG/1
500 CAPSULE ORAL 3 TIMES DAILY
Qty: 30 CAPSULE | Refills: 0 | Status: SHIPPED | OUTPATIENT
Start: 2023-11-22 | End: 2023-12-02

## 2023-11-22 ASSESSMENT — ENCOUNTER SYMPTOMS
GASTROINTESTINAL NEGATIVE: 1
ABDOMINAL PAIN: 0
SORE THROAT: 1
NAUSEA: 0
VOMITING: 0

## 2023-11-22 ASSESSMENT — PATIENT HEALTH QUESTIONNAIRE - PHQ9
6. FEELING BAD ABOUT YOURSELF - OR THAT YOU ARE A FAILURE OR HAVE LET YOURSELF OR YOUR FAMILY DOWN: 0
5. POOR APPETITE OR OVEREATING: 0
10. IF YOU CHECKED OFF ANY PROBLEMS, HOW DIFFICULT HAVE THESE PROBLEMS MADE IT FOR YOU TO DO YOUR WORK, TAKE CARE OF THINGS AT HOME, OR GET ALONG WITH OTHER PEOPLE: 0
SUM OF ALL RESPONSES TO PHQ QUESTIONS 1-9: 0
3. TROUBLE FALLING OR STAYING ASLEEP: 0
SUM OF ALL RESPONSES TO PHQ QUESTIONS 1-9: 0
9. THOUGHTS THAT YOU WOULD BE BETTER OFF DEAD, OR OF HURTING YOURSELF: 0
SUM OF ALL RESPONSES TO PHQ9 QUESTIONS 1 & 2: 0
7. TROUBLE CONCENTRATING ON THINGS, SUCH AS READING THE NEWSPAPER OR WATCHING TELEVISION: 0
1. LITTLE INTEREST OR PLEASURE IN DOING THINGS: 0
8. MOVING OR SPEAKING SO SLOWLY THAT OTHER PEOPLE COULD HAVE NOTICED. OR THE OPPOSITE, BEING SO FIGETY OR RESTLESS THAT YOU HAVE BEEN MOVING AROUND A LOT MORE THAN USUAL: 0
SUM OF ALL RESPONSES TO PHQ QUESTIONS 1-9: 0
2. FEELING DOWN, DEPRESSED OR HOPELESS: 0
4. FEELING TIRED OR HAVING LITTLE ENERGY: 0
SUM OF ALL RESPONSES TO PHQ QUESTIONS 1-9: 0

## 2023-11-22 ASSESSMENT — COLUMBIA-SUICIDE SEVERITY RATING SCALE - C-SSRS
3. HAVE YOU BEEN THINKING ABOUT HOW YOU MIGHT KILL YOURSELF?: NO
7. DID THIS OCCUR IN THE LAST THREE MONTHS: NO
4. HAVE YOU HAD THESE THOUGHTS AND HAD SOME INTENTION OF ACTING ON THEM?: NO
5. HAVE YOU STARTED TO WORK OUT OR WORKED OUT THE DETAILS OF HOW TO KILL YOURSELF? DO YOU INTEND TO CARRY OUT THIS PLAN?: NO

## 2023-11-22 NOTE — PROGRESS NOTES
Chief Complaint   Patient presents with    Pharyngitis     yestertday    Dysuria       HPI:    Ryann Buckley is a 70 y.o. female who presents for an acute visit. She endorses sore throat and fatigue that suddenly began yesterday; denies fever, chills, nasal congestion, cough, or other symptoms. She has been taking APAP with some relief of her discomfort. She also dysuria and urgency that began 3-4 days ago; denies abdominal pain, hematuria, flank or back pain. Allergies   Allergen Reactions    Nitrofurantoin Monohyd Macro Palpitations    Meloxicam Other (See Comments)     Reaction Type: Side Effect; Reaction(s): Bruising      Ambrosia Artemisiifolia (Ragweed) Skin Test Rash    Naproxen Rash    Nitrofurantoin Palpitations       Current Outpatient Medications   Medication Sig Dispense Refill    amoxicillin (AMOXIL) 500 MG capsule Take 1 capsule by mouth 3 times daily for 10 days 30 capsule 0    venlafaxine (EFFEXOR XR) 75 MG extended release capsule Take 1 capsule by mouth daily 30 capsule 3    lisdexamfetamine (VYVANSE) 50 MG capsule Take 1 capsule by mouth daily for 30 days.  Max Daily Amount: 50 mg 30 capsule 0    levothyroxine (SYNTHROID) 75 MCG tablet Take 1 tablet by mouth Daily 30 tablet 5    omeprazole (PRILOSEC) 40 MG delayed release capsule Take 1 capsule by mouth daily 90 capsule 1    acetaminophen (TYLENOL) 500 MG CAPS capsule Take 2 capsules by mouth as needed      ascorbic acid (VITAMIN C) 500 MG tablet Take 2 tablets by mouth daily      fluticasone (FLONASE) 50 MCG/ACT nasal spray 2 sprays by Nasal route daily      furosemide (LASIX) 20 MG tablet Take 1 tablet by mouth daily as needed      ibuprofen (ADVIL;MOTRIN) 200 MG tablet Take 1 tablet by mouth      busPIRone (BUSPAR) 5 MG tablet Take 1 tablet by mouth 2 times daily as needed (Patient not taking: Reported on 9/25/2023)      ergocalciferol (ERGOCALCIFEROL) 1.25 MG (33076 UT) capsule Take 1 capsule by mouth every 7 days (Patient not taking:

## 2023-11-25 LAB
BACTERIA SPEC CULT: ABNORMAL
CC UR VC: ABNORMAL
SERVICE CMNT-IMP: ABNORMAL

## 2023-12-04 DIAGNOSIS — F90.2 ATTENTION DEFICIT HYPERACTIVITY DISORDER (ADHD), COMBINED TYPE: ICD-10-CM

## 2023-12-04 NOTE — TELEPHONE ENCOUNTER
Last OV, 9/20/2023    Requested Prescriptions     Pending Prescriptions Disp Refills    lisdexamfetamine (VYVANSE) 50 MG capsule 30 capsule 0     Sig: Take 1 capsule by mouth daily for 30 days.  Max Daily Amount: 50 mg

## 2023-12-05 RX ORDER — LISDEXAMFETAMINE DIMESYLATE CAPSULES 50 MG/1
50 CAPSULE ORAL DAILY
Qty: 30 CAPSULE | Refills: 0 | Status: SHIPPED | OUTPATIENT
Start: 2023-12-05 | End: 2024-01-04

## 2023-12-18 DIAGNOSIS — R19.7 DIARRHEA, UNSPECIFIED TYPE: Primary | ICD-10-CM

## 2024-01-03 DIAGNOSIS — F90.2 ATTENTION DEFICIT HYPERACTIVITY DISORDER (ADHD), COMBINED TYPE: ICD-10-CM

## 2024-01-03 RX ORDER — LISDEXAMFETAMINE DIMESYLATE CAPSULES 50 MG/1
50 CAPSULE ORAL DAILY
Qty: 30 CAPSULE | Refills: 0 | Status: SHIPPED | OUTPATIENT
Start: 2024-01-03 | End: 2024-02-02

## 2024-01-03 RX ORDER — LISDEXAMFETAMINE DIMESYLATE CAPSULES 50 MG/1
50 CAPSULE ORAL DAILY
Qty: 30 CAPSULE | Refills: 0 | OUTPATIENT
Start: 2024-01-03 | End: 2024-02-02

## 2024-01-03 NOTE — TELEPHONE ENCOUNTER
Patient requesting refill on     Requested Prescriptions     Pending Prescriptions Disp Refills    lisdexamfetamine (VYVANSE) 50 MG capsule 30 capsule 0     Sig: Take 1 capsule by mouth daily for 30 days. Max Daily Amount: 50 mg        Last OV 8/6/2021

## 2024-01-29 DIAGNOSIS — F90.2 ATTENTION DEFICIT HYPERACTIVITY DISORDER (ADHD), COMBINED TYPE: ICD-10-CM

## 2024-01-31 RX ORDER — LISDEXAMFETAMINE DIMESYLATE CAPSULES 50 MG/1
50 CAPSULE ORAL DAILY
Qty: 30 CAPSULE | Refills: 0 | Status: SHIPPED | OUTPATIENT
Start: 2024-01-31 | End: 2024-03-01

## 2024-01-31 RX ORDER — OMEPRAZOLE 40 MG/1
40 CAPSULE, DELAYED RELEASE ORAL DAILY
Qty: 90 CAPSULE | Refills: 1 | Status: SHIPPED | OUTPATIENT
Start: 2024-01-31

## 2024-01-31 RX ORDER — VENLAFAXINE HYDROCHLORIDE 75 MG/1
75 CAPSULE, EXTENDED RELEASE ORAL DAILY
Qty: 30 CAPSULE | Refills: 3 | Status: SHIPPED | OUTPATIENT
Start: 2024-01-31

## 2024-02-21 ENCOUNTER — PATIENT MESSAGE (OUTPATIENT)
Age: 64
End: 2024-02-21

## 2024-02-21 DIAGNOSIS — F90.2 ATTENTION DEFICIT HYPERACTIVITY DISORDER (ADHD), COMBINED TYPE: ICD-10-CM

## 2024-02-27 NOTE — TELEPHONE ENCOUNTER
----- Message from Tyrone Berger sent at 2/26/2024  1:39 PM EST -----  Regarding: part D Medicare  Contact: [phone]  Celi,  Yes, all prescriptions  Jenifer Berger  [phone]

## 2024-02-27 NOTE — TELEPHONE ENCOUNTER
From: Tyrone Berger  To: Ria Ortiz  Sent: 2/21/2024 10:47 AM EST  Subject: part D Medicare    roe Rollins,  I have a new insurance for my part D Medicare. They said they need you to contact with pre-authorization for my medication’s. I am sending you their phone information in a picture I have taken. Please let me know if this is sufficient information.  Thank You,  Jenifer

## 2024-02-28 NOTE — TELEPHONE ENCOUNTER
----- Message from Tyrone Berger sent at 2/26/2024  1:39 PM EST -----  Regarding: part D Medicare  Contact: [phone]  Celi,  Yes, all prescriptions  Jenifer Berger  [phone]

## 2024-02-29 ENCOUNTER — TELEPHONE (OUTPATIENT)
Age: 64
End: 2024-02-29

## 2024-02-29 DIAGNOSIS — D50.8 IRON DEFICIENCY ANEMIA SECONDARY TO INADEQUATE DIETARY IRON INTAKE: ICD-10-CM

## 2024-02-29 DIAGNOSIS — E55.9 VITAMIN D DEFICIENCY, UNSPECIFIED: ICD-10-CM

## 2024-02-29 DIAGNOSIS — D50.8 OTHER IRON DEFICIENCY ANEMIAS: Primary | ICD-10-CM

## 2024-02-29 PROCEDURE — 36415 COLL VENOUS BLD VENIPUNCTURE: CPT | Performed by: NURSE PRACTITIONER

## 2024-02-29 NOTE — TELEPHONE ENCOUNTER
Pt says she is starting to crave ice again and thinks she needs her iron checked again will you order it ?and vitamin d

## 2024-03-01 NOTE — TELEPHONE ENCOUNTER
PC to Pt's [facility] mail order pharmacy, [phone], KENNY GARZA  Per Alexia, pt's [facility] would be [facility], address confirmed.

## 2024-03-04 RX ORDER — VENLAFAXINE HYDROCHLORIDE 75 MG/1
75 CAPSULE, EXTENDED RELEASE ORAL DAILY
Qty: 90 CAPSULE | Refills: 0 | Status: SHIPPED | OUTPATIENT
Start: 2024-03-04

## 2024-03-04 RX ORDER — OMEPRAZOLE 40 MG/1
40 CAPSULE, DELAYED RELEASE ORAL DAILY
Qty: 90 CAPSULE | Refills: 0 | Status: SHIPPED | OUTPATIENT
Start: 2024-03-04

## 2024-03-04 RX ORDER — FUROSEMIDE 20 MG/1
20 TABLET ORAL DAILY PRN
Qty: 60 TABLET | Refills: 0 | Status: SHIPPED | OUTPATIENT
Start: 2024-03-04

## 2024-03-04 RX ORDER — LISDEXAMFETAMINE DIMESYLATE CAPSULES 50 MG/1
50 CAPSULE ORAL DAILY
Qty: 30 CAPSULE | Refills: 0 | Status: SHIPPED | OUTPATIENT
Start: 2024-03-04 | End: 2024-03-08 | Stop reason: RX

## 2024-03-04 RX ORDER — LEVOTHYROXINE SODIUM 0.07 MG/1
75 TABLET ORAL DAILY
Qty: 90 TABLET | Refills: 0 | Status: SHIPPED | OUTPATIENT
Start: 2024-03-04

## 2024-03-04 RX ORDER — FLUTICASONE PROPIONATE 50 MCG
2 SPRAY, SUSPENSION (ML) NASAL DAILY
Qty: 16 G | Refills: 1 | Status: SHIPPED | OUTPATIENT
Start: 2024-03-04

## 2024-03-08 RX ORDER — LISDEXAMFETAMINE DIMESYLATE CAPSULES 40 MG/1
40 CAPSULE ORAL EVERY MORNING
COMMUNITY
End: 2024-03-13 | Stop reason: DRUGHIGH

## 2024-03-08 NOTE — TELEPHONE ENCOUNTER
Patient requesting refill on     Requested Prescriptions     Pending Prescriptions Disp Refills    Lisdexamfetamine Dimesylate 40 MG CAPS 90 capsule 0     Sig: Take 40 mg by mouth every morning for 90 days. Max Daily Amount: 40 mg        Last OV 9/20/2023

## 2024-03-12 RX ORDER — LEVOTHYROXINE SODIUM 0.07 MG/1
75 TABLET ORAL DAILY
Qty: 90 TABLET | Refills: 0 | Status: SHIPPED | OUTPATIENT
Start: 2024-03-12

## 2024-03-13 DIAGNOSIS — F90.2 ATTENTION DEFICIT HYPERACTIVITY DISORDER (ADHD), COMBINED TYPE: Primary | ICD-10-CM

## 2024-03-13 RX ORDER — LISDEXAMFETAMINE DIMESYLATE CAPSULES 50 MG/1
50 CAPSULE ORAL DAILY
Qty: 30 CAPSULE | Refills: 0 | Status: SHIPPED | OUTPATIENT
Start: 2024-03-13 | End: 2024-03-14 | Stop reason: SDUPTHER

## 2024-03-14 DIAGNOSIS — F90.2 ATTENTION DEFICIT HYPERACTIVITY DISORDER (ADHD), COMBINED TYPE: ICD-10-CM

## 2024-03-14 RX ORDER — LISDEXAMFETAMINE DIMESYLATE CAPSULES 50 MG/1
50 CAPSULE ORAL DAILY
Qty: 30 CAPSULE | Refills: 0 | Status: SHIPPED | OUTPATIENT
Start: 2024-03-14 | End: 2024-04-13

## 2024-03-15 RX ORDER — LISDEXAMFETAMINE DIMESYLATE CAPSULES 40 MG/1
40 CAPSULE ORAL EVERY MORNING
Qty: 90 CAPSULE | Refills: 0 | OUTPATIENT
Start: 2024-03-15 | End: 2024-06-13

## 2024-03-18 ENCOUNTER — OFFICE VISIT (OUTPATIENT)
Age: 64
End: 2024-03-18
Payer: MEDICARE

## 2024-03-18 VITALS
BODY MASS INDEX: 30.37 KG/M2 | TEMPERATURE: 97.2 F | HEART RATE: 98 BPM | RESPIRATION RATE: 18 BRPM | OXYGEN SATURATION: 100 % | SYSTOLIC BLOOD PRESSURE: 130 MMHG | DIASTOLIC BLOOD PRESSURE: 80 MMHG | WEIGHT: 189 LBS | HEIGHT: 66 IN

## 2024-03-18 DIAGNOSIS — E55.9 VITAMIN D DEFICIENCY, UNSPECIFIED: ICD-10-CM

## 2024-03-18 DIAGNOSIS — R79.9 ABNORMAL FINDING OF BLOOD CHEMISTRY, UNSPECIFIED: ICD-10-CM

## 2024-03-18 DIAGNOSIS — E03.9 ACQUIRED HYPOTHYROIDISM: ICD-10-CM

## 2024-03-18 DIAGNOSIS — F33.2 MAJOR DEPRESSIVE DISORDER, RECURRENT SEVERE WITHOUT PSYCHOTIC FEATURES (HCC): ICD-10-CM

## 2024-03-18 DIAGNOSIS — D50.8 IRON DEFICIENCY ANEMIA SECONDARY TO INADEQUATE DIETARY IRON INTAKE: ICD-10-CM

## 2024-03-18 DIAGNOSIS — F90.2 ATTENTION DEFICIT HYPERACTIVITY DISORDER (ADHD), COMBINED TYPE: Primary | ICD-10-CM

## 2024-03-18 DIAGNOSIS — E78.5 HYPERLIPIDEMIA, UNSPECIFIED HYPERLIPIDEMIA TYPE: ICD-10-CM

## 2024-03-18 DIAGNOSIS — L08.9 INFECTION OF SCALP: ICD-10-CM

## 2024-03-18 PROCEDURE — G8484 FLU IMMUNIZE NO ADMIN: HCPCS | Performed by: NURSE PRACTITIONER

## 2024-03-18 PROCEDURE — 1123F ACP DISCUSS/DSCN MKR DOCD: CPT | Performed by: NURSE PRACTITIONER

## 2024-03-18 PROCEDURE — 99214 OFFICE O/P EST MOD 30 MIN: CPT | Performed by: NURSE PRACTITIONER

## 2024-03-18 PROCEDURE — 1036F TOBACCO NON-USER: CPT | Performed by: NURSE PRACTITIONER

## 2024-03-18 PROCEDURE — G8417 CALC BMI ABV UP PARAM F/U: HCPCS | Performed by: NURSE PRACTITIONER

## 2024-03-18 PROCEDURE — G8400 PT W/DXA NO RESULTS DOC: HCPCS | Performed by: NURSE PRACTITIONER

## 2024-03-18 PROCEDURE — 3017F COLORECTAL CA SCREEN DOC REV: CPT | Performed by: NURSE PRACTITIONER

## 2024-03-18 PROCEDURE — 1090F PRES/ABSN URINE INCON ASSESS: CPT | Performed by: NURSE PRACTITIONER

## 2024-03-18 PROCEDURE — 36415 COLL VENOUS BLD VENIPUNCTURE: CPT | Performed by: NURSE PRACTITIONER

## 2024-03-18 PROCEDURE — G8427 DOCREV CUR MEDS BY ELIG CLIN: HCPCS | Performed by: NURSE PRACTITIONER

## 2024-03-18 RX ORDER — AMOXICILLIN 500 MG/1
500 CAPSULE ORAL 2 TIMES DAILY
Qty: 20 CAPSULE | Refills: 0 | Status: SHIPPED | OUTPATIENT
Start: 2024-03-18 | End: 2024-03-28

## 2024-03-18 RX ORDER — AMOXICILLIN 500 MG/1
500 CAPSULE ORAL 2 TIMES DAILY
Qty: 20 CAPSULE | Refills: 0 | Status: SHIPPED | OUTPATIENT
Start: 2024-03-18 | End: 2024-03-18 | Stop reason: SDUPTHER

## 2024-03-18 ASSESSMENT — PATIENT HEALTH QUESTIONNAIRE - PHQ9
5. POOR APPETITE OR OVEREATING: NOT AT ALL
9. THOUGHTS THAT YOU WOULD BE BETTER OFF DEAD, OR OF HURTING YOURSELF: NOT AT ALL
8. MOVING OR SPEAKING SO SLOWLY THAT OTHER PEOPLE COULD HAVE NOTICED. OR THE OPPOSITE, BEING SO FIGETY OR RESTLESS THAT YOU HAVE BEEN MOVING AROUND A LOT MORE THAN USUAL: NOT AT ALL
3. TROUBLE FALLING OR STAYING ASLEEP: NOT AT ALL
SUM OF ALL RESPONSES TO PHQ QUESTIONS 1-9: 0
7. TROUBLE CONCENTRATING ON THINGS, SUCH AS READING THE NEWSPAPER OR WATCHING TELEVISION: NOT AT ALL
1. LITTLE INTEREST OR PLEASURE IN DOING THINGS: NOT AT ALL
SUM OF ALL RESPONSES TO PHQ9 QUESTIONS 1 & 2: 0
2. FEELING DOWN, DEPRESSED OR HOPELESS: NOT AT ALL
SUM OF ALL RESPONSES TO PHQ QUESTIONS 1-9: 0
6. FEELING BAD ABOUT YOURSELF - OR THAT YOU ARE A FAILURE OR HAVE LET YOURSELF OR YOUR FAMILY DOWN: NOT AT ALL
4. FEELING TIRED OR HAVING LITTLE ENERGY: NOT AT ALL
10. IF YOU CHECKED OFF ANY PROBLEMS, HOW DIFFICULT HAVE THESE PROBLEMS MADE IT FOR YOU TO DO YOUR WORK, TAKE CARE OF THINGS AT HOME, OR GET ALONG WITH OTHER PEOPLE: NOT DIFFICULT AT ALL

## 2024-03-18 NOTE — PROGRESS NOTES
Subjective:     Tyrone Berger is a 63 y.o. female who presents today with the following:  Chief Complaint   Patient presents with    Thyroid Problem    Anemia    Hair/Scalp Problem       Patient Active Problem List   Diagnosis    Allergic rhinitis    Vitamin D deficiency    Hypothyroid    Right ear pain    Herpes zoster of eye    MDD (major depressive disorder), recurrent severe, without psychosis (HCC)    Iron deficiency anemia    Acute on chronic anemia    Iron deficiency anemia secondary to inadequate dietary iron intake    Insomnia    Neck injury    Osteoarthritis of right knee    Pes anserinus bursitis of right knee    Right knee pain         COMPLIANT WITH MEDICATION:   HTN; Denies chest pain, dyspnea, palpitations, headache and blurred vision. Blood pressure normotensive.    depression screening addressed not at risk    abuse screening addressed denies    learning assessment addressed reviewed nurses notes    fall risk addressed not at risk    HM: addressed    ROS:  Gen: denies fever, chills, fatigue, weight loss, weight gain  HEENT:denies blurry vision, nasal congestion, sore throat  Resp: denies dypsnea, cough, wheezing  CV: denies chest pain radiating to the jaws or arms, palpitations, lower extremity edema  Abd: denies nausea, vomiting, diarrhea, constipation  Neuro: denies numbness/tingling  Endo: denies polyuria, polydipsia, heat/cold intolerance  Heme: no lymphadenopathy    Allergies   Allergen Reactions    Nitrofurantoin Monohyd Macro Palpitations    Meloxicam Other (See Comments)     Reaction Type: Side Effect; Reaction(s): Bruising      Ambrosia Artemisiifolia (Ragweed) Skin Test Rash    Naproxen Rash    Nitrofurantoin Palpitations         Current Outpatient Medications:     amoxicillin (AMOXIL) 500 MG capsule, Take 1 capsule by mouth 2 times daily for 10 days, Disp: 20 capsule, Rfl: 0    lisdexamfetamine (VYVANSE) 50 MG capsule, Take 1 capsule by mouth daily for 30 days. Max Daily Amount: 50

## 2024-03-18 NOTE — PROGRESS NOTES
Chief Complaint   Patient presents with    Thyroid Problem    Anemia       Vitals:    03/18/24 1453   BP: 130/80   Pulse: 98   Resp: 18   Temp: 97.2 °F (36.2 °C)   SpO2: 100%   \"Have you been to the ER, urgent care clinic since your last visit?  Hospitalized since your last visit?\"    NO    “Have you seen or consulted any other health care providers outside of [facility] since your last visit?”    NO    Have you had a mammogram?”   Yes     Date of last Mammogram: 9/30/2021             Click Here for Release of Records Request

## 2024-03-19 LAB
ALBUMIN SERPL-MCNC: 4.2 G/DL (ref 3.5–5)
ALBUMIN/GLOB SERPL: 1.3 (ref 1.1–2.2)
ALP SERPL-CCNC: 128 U/L (ref 45–117)
ALT SERPL-CCNC: 22 U/L (ref 12–78)
ANION GAP SERPL CALC-SCNC: 5 MMOL/L (ref 5–15)
AST SERPL-CCNC: 17 U/L (ref 15–37)
BASOPHILS # BLD: 0.1 K/UL (ref 0–0.1)
BASOPHILS NFR BLD: 1 % (ref 0–1)
BILIRUB SERPL-MCNC: 0.4 MG/DL (ref 0.2–1)
BUN SERPL-MCNC: 25 MG/DL (ref 6–20)
BUN/CREAT SERPL: 27 (ref 12–20)
CALCIUM SERPL-MCNC: 10.1 MG/DL (ref 8.5–10.1)
CHLORIDE SERPL-SCNC: 106 MMOL/L (ref 97–108)
CHOLEST SERPL-MCNC: 229 MG/DL
CO2 SERPL-SCNC: 28 MMOL/L (ref 21–32)
CREAT SERPL-MCNC: 0.91 MG/DL (ref 0.55–1.02)
DIFFERENTIAL METHOD BLD: NORMAL
EOSINOPHIL # BLD: 0.1 K/UL (ref 0–0.4)
EOSINOPHIL NFR BLD: 2 % (ref 0–7)
ERYTHROCYTE [DISTWIDTH] IN BLOOD BY AUTOMATED COUNT: 12.2 % (ref 11.5–14.5)
GLOBULIN SER CALC-MCNC: 3.3 G/DL (ref 2–4)
GLUCOSE SERPL-MCNC: 103 MG/DL (ref 65–100)
HCT VFR BLD AUTO: 38.9 % (ref 35–47)
HDLC SERPL-MCNC: 77 MG/DL
HDLC SERPL: 3 (ref 0–5)
HGB BLD-MCNC: 11.8 G/DL (ref 11.5–16)
IMM GRANULOCYTES # BLD AUTO: 0 K/UL (ref 0–0.04)
IMM GRANULOCYTES NFR BLD AUTO: 0 % (ref 0–0.5)
IRON SATN MFR SERPL: 5 % (ref 20–50)
IRON SERPL-MCNC: 25 UG/DL (ref 35–150)
LDLC SERPL CALC-MCNC: 131.4 MG/DL (ref 0–100)
LYMPHOCYTES # BLD: 1.6 K/UL (ref 0.8–3.5)
LYMPHOCYTES NFR BLD: 19 % (ref 12–49)
MCH RBC QN AUTO: 28.7 PG (ref 26–34)
MCHC RBC AUTO-ENTMCNC: 30.3 G/DL (ref 30–36.5)
MCV RBC AUTO: 94.6 FL (ref 80–99)
MONOCYTES # BLD: 0.6 K/UL (ref 0–1)
MONOCYTES NFR BLD: 7 % (ref 5–13)
NEUTS SEG # BLD: 6 K/UL (ref 1.8–8)
NEUTS SEG NFR BLD: 71 % (ref 32–75)
NRBC # BLD: 0 K/UL (ref 0–0.01)
NRBC BLD-RTO: 0 PER 100 WBC
PLATELET # BLD AUTO: 383 K/UL (ref 150–400)
PMV BLD AUTO: 10.7 FL (ref 8.9–12.9)
POTASSIUM SERPL-SCNC: 4 MMOL/L (ref 3.5–5.1)
PROT SERPL-MCNC: 7.5 G/DL (ref 6.4–8.2)
RBC # BLD AUTO: 4.11 M/UL (ref 3.8–5.2)
SODIUM SERPL-SCNC: 139 MMOL/L (ref 136–145)
T4 FREE SERPL-MCNC: 1.3 NG/DL (ref 0.8–1.5)
TIBC SERPL-MCNC: 506 UG/DL (ref 250–450)
TRIGL SERPL-MCNC: 103 MG/DL
TSH SERPL DL<=0.05 MIU/L-ACNC: 1.33 UIU/ML (ref 0.36–3.74)
VLDLC SERPL CALC-MCNC: 20.6 MG/DL
WBC # BLD AUTO: 8.3 K/UL (ref 3.6–11)

## 2024-03-22 LAB — DRUGS UR: NORMAL

## 2024-04-02 ENCOUNTER — PATIENT MESSAGE (OUTPATIENT)
Age: 64
End: 2024-04-02

## 2024-04-02 DIAGNOSIS — S00.01XS: Primary | ICD-10-CM

## 2024-04-02 DIAGNOSIS — L08.9: Primary | ICD-10-CM

## 2024-04-02 RX ORDER — AMOXICILLIN 250 MG/1
250 CAPSULE ORAL DAILY
Qty: 90 CAPSULE | Refills: 3 | Status: SHIPPED | OUTPATIENT
Start: 2024-04-02

## 2024-04-02 RX ORDER — VENLAFAXINE HYDROCHLORIDE 75 MG/1
75 CAPSULE, EXTENDED RELEASE ORAL DAILY
Qty: 90 CAPSULE | Refills: 0 | Status: SHIPPED | OUTPATIENT
Start: 2024-04-02

## 2024-04-02 NOTE — TELEPHONE ENCOUNTER
Patient requesting refill on     Requested Prescriptions     Pending Prescriptions Disp Refills    venlafaxine (EFFEXOR XR) 75 MG extended release capsule 90 capsule 0     Sig: Take 1 capsule by mouth daily        Last OV 11/22/2023

## 2024-04-03 NOTE — TELEPHONE ENCOUNTER
From: Tyrone Berger  To: Ria Ortiz  Sent: 4/2/2024 6:17 PM EDT  Subject: generic Effexor    roe Rollins,  I sent in a prescription request for the amoxicillin and that was processed. I am wondering about the generic Effexor. I took the last one two mornings ago and was hopeful I would get it today. It only takes missing one pill to have nightmares. Or I have bad nights sleep. Can you tell me if it’s in process and where, which [facility]?   Orbicularis Oris Muscle Flap Text: The defect edges were debeveled with a #15 scalpel blade.  Given that the defect affected the competency of the oral sphincter an orbicularis oris muscle flap was deemed most appropriate to restore this competency and normal muscle function.  Using a sterile surgical marker, an appropriate flap was drawn incorporating the defect. The area thus outlined was incised with a #15 scalpel blade.

## 2024-04-15 DIAGNOSIS — F90.2 ATTENTION DEFICIT HYPERACTIVITY DISORDER (ADHD), COMBINED TYPE: ICD-10-CM

## 2024-04-16 RX ORDER — LISDEXAMFETAMINE DIMESYLATE CAPSULES 50 MG/1
50 CAPSULE ORAL DAILY
Qty: 30 CAPSULE | Refills: 0 | Status: SHIPPED | OUTPATIENT
Start: 2024-04-16 | End: 2024-05-16

## 2024-05-03 ENCOUNTER — OFFICE VISIT (OUTPATIENT)
Age: 64
End: 2024-05-03
Payer: MEDICARE

## 2024-05-03 VITALS
OXYGEN SATURATION: 96 % | DIASTOLIC BLOOD PRESSURE: 72 MMHG | TEMPERATURE: 97.1 F | RESPIRATION RATE: 18 BRPM | HEART RATE: 87 BPM | BODY MASS INDEX: 30.53 KG/M2 | WEIGHT: 190 LBS | HEIGHT: 66 IN | SYSTOLIC BLOOD PRESSURE: 128 MMHG

## 2024-05-03 DIAGNOSIS — N30.01 ACUTE CYSTITIS WITH HEMATURIA: Primary | ICD-10-CM

## 2024-05-03 DIAGNOSIS — R63.5 WEIGHT GAIN: ICD-10-CM

## 2024-05-03 DIAGNOSIS — R30.0 DYSURIA: ICD-10-CM

## 2024-05-03 LAB
BILIRUBIN, URINE, POC: NORMAL
BLOOD URINE, POC: NORMAL
GLUCOSE URINE, POC: 100
KETONES, URINE, POC: NORMAL
LEUKOCYTE ESTERASE, URINE, POC: NORMAL
NITRITE, URINE, POC: POSITIVE
PH, URINE, POC: 5.5 (ref 4.6–8)
PROTEIN,URINE, POC: 100
SPECIFIC GRAVITY, URINE, POC: 1.02 (ref 1–1.03)
URINALYSIS CLARITY, POC: CLEAR
URINALYSIS COLOR, POC: YELLOW
UROBILINOGEN, POC: NORMAL

## 2024-05-03 PROCEDURE — 3017F COLORECTAL CA SCREEN DOC REV: CPT

## 2024-05-03 PROCEDURE — G8417 CALC BMI ABV UP PARAM F/U: HCPCS

## 2024-05-03 PROCEDURE — 1123F ACP DISCUSS/DSCN MKR DOCD: CPT

## 2024-05-03 PROCEDURE — 99213 OFFICE O/P EST LOW 20 MIN: CPT

## 2024-05-03 PROCEDURE — G8427 DOCREV CUR MEDS BY ELIG CLIN: HCPCS

## 2024-05-03 PROCEDURE — 81003 URINALYSIS AUTO W/O SCOPE: CPT

## 2024-05-03 PROCEDURE — 1090F PRES/ABSN URINE INCON ASSESS: CPT

## 2024-05-03 PROCEDURE — G8400 PT W/DXA NO RESULTS DOC: HCPCS

## 2024-05-03 PROCEDURE — 1036F TOBACCO NON-USER: CPT

## 2024-05-03 RX ORDER — SULFAMETHOXAZOLE AND TRIMETHOPRIM 800; 160 MG/1; MG/1
1 TABLET ORAL 2 TIMES DAILY
Qty: 10 TABLET | Refills: 0 | Status: SHIPPED | OUTPATIENT
Start: 2024-05-03 | End: 2024-05-08

## 2024-05-03 SDOH — ECONOMIC STABILITY: HOUSING INSECURITY
IN THE LAST 12 MONTHS, WAS THERE A TIME WHEN YOU DID NOT HAVE A STEADY PLACE TO SLEEP OR SLEPT IN A SHELTER (INCLUDING NOW)?: NO

## 2024-05-03 SDOH — ECONOMIC STABILITY: FOOD INSECURITY: WITHIN THE PAST 12 MONTHS, THE FOOD YOU BOUGHT JUST DIDN'T LAST AND YOU DIDN'T HAVE MONEY TO GET MORE.: NEVER TRUE

## 2024-05-03 SDOH — ECONOMIC STABILITY: INCOME INSECURITY: HOW HARD IS IT FOR YOU TO PAY FOR THE VERY BASICS LIKE FOOD, HOUSING, MEDICAL CARE, AND HEATING?: NOT HARD AT ALL

## 2024-05-03 SDOH — ECONOMIC STABILITY: FOOD INSECURITY: WITHIN THE PAST 12 MONTHS, YOU WORRIED THAT YOUR FOOD WOULD RUN OUT BEFORE YOU GOT MONEY TO BUY MORE.: NEVER TRUE

## 2024-05-03 ASSESSMENT — PATIENT HEALTH QUESTIONNAIRE - PHQ9
2. FEELING DOWN, DEPRESSED OR HOPELESS: NOT AT ALL
SUM OF ALL RESPONSES TO PHQ QUESTIONS 1-9: 0
SUM OF ALL RESPONSES TO PHQ QUESTIONS 1-9: 0
1. LITTLE INTEREST OR PLEASURE IN DOING THINGS: NOT AT ALL
SUM OF ALL RESPONSES TO PHQ QUESTIONS 1-9: 0
SUM OF ALL RESPONSES TO PHQ QUESTIONS 1-9: 0
SUM OF ALL RESPONSES TO PHQ9 QUESTIONS 1 & 2: 0

## 2024-05-03 ASSESSMENT — ENCOUNTER SYMPTOMS
CONSTIPATION: 0
COUGH: 0
ALLERGIC/IMMUNOLOGIC NEGATIVE: 1
SHORTNESS OF BREATH: 0
EYES NEGATIVE: 1
DIARRHEA: 0
RESPIRATORY NEGATIVE: 1
WHEEZING: 0
BLOOD IN STOOL: 0

## 2024-05-03 NOTE — PROGRESS NOTES
\"Have you been to the ER, urgent care clinic since your last visit?  Hospitalized since your last visit?\"    NO    “Have you seen or consulted any other health care providers outside of [facility] since your last visit?”    NO    Have you had a mammogram?”   NO    Date of last Mammogram: 9/30/2021     Chief Complaint   Patient presents with    Dysuria     Frequency x 3-4 days        Vitals:    05/03/24 1346   BP: 128/72   Pulse: 87   Resp: 18   Temp: 97.1 °F (36.2 °C)   SpO2: 96%           Click Here for Release of Records Request    
MG tablet Take 1 tablet by mouth       No current facility-administered medications for this visit.       Past Medical History:   Diagnosis Date    ADHD     Allergies     Anemia     Anxiety     GERD (gastroesophageal reflux disease)     Hyperthyroidism     hypothyroid    Urticaria        Family History   Problem Relation Age of Onset    Rheum Arthritis Paternal Aunt     Cancer Paternal Uncle 70        prostate ?    Cancer Maternal Uncle 70        prostate    Cancer Maternal Aunt         unknown    Alcohol Abuse Sister     Heart Disease Mother         open heart surgery with valve replacement    Alcohol Abuse Brother     Alcohol Abuse Paternal Grandfather     Alcohol Abuse Father     Dementia Father        Review of Systems   Constitutional:  Positive for unexpected weight change. Negative for chills, fatigue and fever.   HENT: Negative.     Eyes: Negative.    Respiratory: Negative.  Negative for cough, shortness of breath and wheezing.    Cardiovascular:  Negative for chest pain, palpitations and leg swelling.   Gastrointestinal:  Negative for blood in stool, constipation and diarrhea.   Endocrine: Negative.    Genitourinary:  Positive for dysuria and frequency.   Musculoskeletal: Negative.    Skin: Negative.    Allergic/Immunologic: Negative.    Neurological: Negative.  Negative for dizziness and headaches.   Hematological: Negative.    Psychiatric/Behavioral: Negative.  Negative for dysphoric mood and sleep disturbance. The patient is not nervous/anxious.           Vitals:    05/03/24 1346   BP: 128/72   Site: Left Upper Arm   Position: Sitting   Cuff Size: Medium Adult   Pulse: 87   Resp: 18   Temp: 97.1 °F (36.2 °C)   TempSrc: Infrared   SpO2: 96%   Weight: 86.2 kg (190 lb)   Height: 1.676 m (5' 6\")        Wt Readings from Last 3 Encounters:   05/03/24 86.2 kg (190 lb)   03/18/24 85.7 kg (189 lb)   11/22/23 86.6 kg (191 lb)           5/3/2024     1:45 PM   PHQ-9    Little interest or pleasure in doing things 0

## 2024-05-05 LAB
BACTERIA SPEC CULT: ABNORMAL
CC UR VC: ABNORMAL
SERVICE CMNT-IMP: ABNORMAL

## 2024-05-06 LAB
BACTERIA SPEC CULT: ABNORMAL
CC UR VC: ABNORMAL
SERVICE CMNT-IMP: ABNORMAL

## 2024-05-09 ENCOUNTER — OFFICE VISIT (OUTPATIENT)
Age: 64
End: 2024-05-09
Payer: MEDICARE

## 2024-05-09 VITALS
WEIGHT: 185 LBS | DIASTOLIC BLOOD PRESSURE: 80 MMHG | BODY MASS INDEX: 29.73 KG/M2 | RESPIRATION RATE: 18 BRPM | TEMPERATURE: 97.3 F | HEIGHT: 66 IN | HEART RATE: 60 BPM | SYSTOLIC BLOOD PRESSURE: 120 MMHG

## 2024-05-09 DIAGNOSIS — D50.8 IRON DEFICIENCY ANEMIA SECONDARY TO INADEQUATE DIETARY IRON INTAKE: ICD-10-CM

## 2024-05-09 DIAGNOSIS — N30.01 ACUTE CYSTITIS WITH HEMATURIA: ICD-10-CM

## 2024-05-09 DIAGNOSIS — E55.9 VITAMIN D DEFICIENCY, UNSPECIFIED: ICD-10-CM

## 2024-05-09 DIAGNOSIS — D50.8 OTHER IRON DEFICIENCY ANEMIAS: ICD-10-CM

## 2024-05-09 DIAGNOSIS — R35.0 URINARY FREQUENCY: Primary | ICD-10-CM

## 2024-05-09 DIAGNOSIS — R06.2 WHEEZING: ICD-10-CM

## 2024-05-09 LAB
BILIRUBIN, URINE, POC: NEGATIVE
BLOOD URINE, POC: ABNORMAL
GLUCOSE URINE, POC: NEGATIVE
KETONES, URINE, POC: NEGATIVE
LEUKOCYTE ESTERASE, URINE, POC: ABNORMAL
NITRITE, URINE, POC: NEGATIVE
PH, URINE, POC: 6.5 (ref 4.6–8)
PROTEIN,URINE, POC: ABNORMAL
SPECIFIC GRAVITY, URINE, POC: 1.01 (ref 1–1.03)
URINALYSIS CLARITY, POC: CLEAR
URINALYSIS COLOR, POC: YELLOW
UROBILINOGEN, POC: ABNORMAL

## 2024-05-09 PROCEDURE — 3017F COLORECTAL CA SCREEN DOC REV: CPT | Performed by: NURSE PRACTITIONER

## 2024-05-09 PROCEDURE — G8417 CALC BMI ABV UP PARAM F/U: HCPCS | Performed by: NURSE PRACTITIONER

## 2024-05-09 PROCEDURE — G8427 DOCREV CUR MEDS BY ELIG CLIN: HCPCS | Performed by: NURSE PRACTITIONER

## 2024-05-09 PROCEDURE — G8400 PT W/DXA NO RESULTS DOC: HCPCS | Performed by: NURSE PRACTITIONER

## 2024-05-09 PROCEDURE — 1036F TOBACCO NON-USER: CPT | Performed by: NURSE PRACTITIONER

## 2024-05-09 PROCEDURE — 1090F PRES/ABSN URINE INCON ASSESS: CPT | Performed by: NURSE PRACTITIONER

## 2024-05-09 PROCEDURE — 99213 OFFICE O/P EST LOW 20 MIN: CPT | Performed by: NURSE PRACTITIONER

## 2024-05-09 PROCEDURE — 1123F ACP DISCUSS/DSCN MKR DOCD: CPT | Performed by: NURSE PRACTITIONER

## 2024-05-09 PROCEDURE — 81001 URINALYSIS AUTO W/SCOPE: CPT | Performed by: NURSE PRACTITIONER

## 2024-05-09 RX ORDER — SULFAMETHOXAZOLE AND TRIMETHOPRIM 800; 160 MG/1; MG/1
1 TABLET ORAL 2 TIMES DAILY
Qty: 20 TABLET | Refills: 0 | Status: SHIPPED | OUTPATIENT
Start: 2024-05-09 | End: 2024-05-19

## 2024-05-09 RX ORDER — ALBUTEROL SULFATE 90 UG/1
2 AEROSOL, METERED RESPIRATORY (INHALATION) EVERY 6 HOURS PRN
Qty: 18 G | Refills: 3 | Status: SHIPPED | OUTPATIENT
Start: 2024-05-09

## 2024-05-09 ASSESSMENT — PATIENT HEALTH QUESTIONNAIRE - PHQ9
2. FEELING DOWN, DEPRESSED OR HOPELESS: NOT AT ALL
10. IF YOU CHECKED OFF ANY PROBLEMS, HOW DIFFICULT HAVE THESE PROBLEMS MADE IT FOR YOU TO DO YOUR WORK, TAKE CARE OF THINGS AT HOME, OR GET ALONG WITH OTHER PEOPLE: NOT DIFFICULT AT ALL
SUM OF ALL RESPONSES TO PHQ QUESTIONS 1-9: 0
6. FEELING BAD ABOUT YOURSELF - OR THAT YOU ARE A FAILURE OR HAVE LET YOURSELF OR YOUR FAMILY DOWN: NOT AT ALL
SUM OF ALL RESPONSES TO PHQ QUESTIONS 1-9: 0
5. POOR APPETITE OR OVEREATING: NOT AT ALL
8. MOVING OR SPEAKING SO SLOWLY THAT OTHER PEOPLE COULD HAVE NOTICED. OR THE OPPOSITE, BEING SO FIGETY OR RESTLESS THAT YOU HAVE BEEN MOVING AROUND A LOT MORE THAN USUAL: NOT AT ALL
7. TROUBLE CONCENTRATING ON THINGS, SUCH AS READING THE NEWSPAPER OR WATCHING TELEVISION: NOT AT ALL
4. FEELING TIRED OR HAVING LITTLE ENERGY: NOT AT ALL
SUM OF ALL RESPONSES TO PHQ QUESTIONS 1-9: 0
SUM OF ALL RESPONSES TO PHQ9 QUESTIONS 1 & 2: 0
1. LITTLE INTEREST OR PLEASURE IN DOING THINGS: NOT AT ALL
9. THOUGHTS THAT YOU WOULD BE BETTER OFF DEAD, OR OF HURTING YOURSELF: NOT AT ALL
3. TROUBLE FALLING OR STAYING ASLEEP: NOT AT ALL
SUM OF ALL RESPONSES TO PHQ QUESTIONS 1-9: 0

## 2024-05-09 NOTE — PROGRESS NOTES
Subjective:      Tyrone Berger is a 63 y.o. female who complains of dysuria, flank pain for 2 days.  Patient also complains of back pain. Patient denies stomach ache and vaginal discharge.  Patient does not have a history of recurrent UTI.  Patient does not have a history of pyelonephritis.  Past Medical History:   Diagnosis Date    ADHD     Allergies     Anemia     Anxiety     GERD (gastroesophageal reflux disease)     Hyperthyroidism     hypothyroid    Urticaria      Patient Active Problem List    Diagnosis Date Noted    Iron deficiency anemia secondary to inadequate dietary iron intake 04/24/2023    Osteoarthritis of right knee 09/19/2022    Pes anserinus bursitis of right knee 09/19/2022    Right knee pain 09/19/2022    Allergic rhinitis 02/22/2021    Right ear pain 09/24/2020    Herpes zoster of eye 09/24/2020    Acute on chronic anemia 04/29/2020    MDD (major depressive disorder), recurrent severe, without psychosis (HCC) 06/15/2017    Neck injury 09/30/2014    Vitamin D deficiency 05/23/2012    Hypothyroid 05/23/2012    Iron deficiency anemia 05/23/2012    Insomnia 05/23/2012     Past Surgical History:   Procedure Laterality Date    COLONOSCOPY  2009    COLONOSCOPY N/A 3/18/2022    COLONOSCOPY WITH POSSIBLE POLYPECTOMY, EGD WITH POSSIBLE BIOPSY performed by Ramila Hooker MD at [facility] MAIN OR     Family History   Problem Relation Age of Onset    Rheum Arthritis Paternal Aunt     Cancer Paternal Uncle 70        prostate ?    Cancer Maternal Uncle 70        prostate    Cancer Maternal Aunt         unknown    Alcohol Abuse Sister     Heart Disease Mother         open heart surgery with valve replacement    Alcohol Abuse Brother     Alcohol Abuse Paternal Grandfather     Alcohol Abuse Father     Dementia Father      Social History     Socioeconomic History    Marital status:      Spouse name: None    Number of children: None    Years of education: 16    Highest education level: None   Tobacco Use

## 2024-05-09 NOTE — PROGRESS NOTES
Patient here for labs drawn from right antecubital without pain or bruising. \"Have you been to the ER, urgent care clinic since your last visit?  Hospitalized since your last visit?\"    NO    “Have you seen or consulted any other health care providers outside of [facility] since your last visit?”    NO    Have you had a mammogram?”       Date of last Mammogram: 9/30/2021             Click Here for Release of Records Request      Chief Complaint   Patient presents with    Dysuria         Vitals:    05/09/24 1038   BP: 120/80   Pulse: 60   Resp: 18   Temp: 97.3 °F (36.3 °C)

## 2024-05-10 LAB
25(OH)D3 SERPL-MCNC: 27.2 NG/ML (ref 30–100)
IRON SATN MFR SERPL: 38 % (ref 20–50)
IRON SERPL-MCNC: 130 UG/DL (ref 35–150)
TIBC SERPL-MCNC: 339 UG/DL (ref 250–450)

## 2024-05-11 LAB
BACTERIA SPEC CULT: ABNORMAL
CC UR VC: ABNORMAL
SERVICE CMNT-IMP: ABNORMAL

## 2024-05-14 ENCOUNTER — PATIENT MESSAGE (OUTPATIENT)
Age: 64
End: 2024-05-14

## 2024-05-15 NOTE — TELEPHONE ENCOUNTER
Patient requests that all visits with Dr. Guallpa be cancelled as she has a hematologist in [place] that she is following with.  No appts scheduled at this time.

## 2024-05-16 DIAGNOSIS — F90.2 ATTENTION DEFICIT HYPERACTIVITY DISORDER (ADHD), COMBINED TYPE: ICD-10-CM

## 2024-05-16 RX ORDER — LISDEXAMFETAMINE DIMESYLATE 50 MG/1
50 CAPSULE ORAL DAILY
Qty: 30 CAPSULE | Refills: 0 | Status: SHIPPED | OUTPATIENT
Start: 2024-05-16 | End: 2024-06-15

## 2024-05-23 ENCOUNTER — OFFICE VISIT (OUTPATIENT)
Age: 64
End: 2024-05-23
Payer: MEDICARE

## 2024-05-23 VITALS
DIASTOLIC BLOOD PRESSURE: 80 MMHG | TEMPERATURE: 98 F | HEIGHT: 66 IN | HEART RATE: 79 BPM | BODY MASS INDEX: 30.22 KG/M2 | OXYGEN SATURATION: 98 % | WEIGHT: 188 LBS | RESPIRATION RATE: 18 BRPM | SYSTOLIC BLOOD PRESSURE: 134 MMHG

## 2024-05-23 DIAGNOSIS — R31.9 URINARY TRACT INFECTION WITH HEMATURIA, SITE UNSPECIFIED: Primary | ICD-10-CM

## 2024-05-23 DIAGNOSIS — N39.0 URINARY TRACT INFECTION WITH HEMATURIA, SITE UNSPECIFIED: Primary | ICD-10-CM

## 2024-05-23 LAB
BILIRUBIN, URINE, POC: NEGATIVE
BLOOD URINE, POC: ABNORMAL
GLUCOSE URINE, POC: NEGATIVE
KETONES, URINE, POC: NEGATIVE
LEUKOCYTE ESTERASE, URINE, POC: ABNORMAL
NITRITE, URINE, POC: NEGATIVE
PH, URINE, POC: 6 (ref 4.6–8)
PROTEIN,URINE, POC: 30
SPECIFIC GRAVITY, URINE, POC: 1.03 (ref 1–1.03)
URINALYSIS CLARITY, POC: CLEAR
URINALYSIS COLOR, POC: YELLOW
UROBILINOGEN, POC: ABNORMAL

## 2024-05-23 PROCEDURE — G8400 PT W/DXA NO RESULTS DOC: HCPCS | Performed by: NURSE PRACTITIONER

## 2024-05-23 PROCEDURE — 81001 URINALYSIS AUTO W/SCOPE: CPT | Performed by: NURSE PRACTITIONER

## 2024-05-23 PROCEDURE — G8427 DOCREV CUR MEDS BY ELIG CLIN: HCPCS | Performed by: NURSE PRACTITIONER

## 2024-05-23 PROCEDURE — 1090F PRES/ABSN URINE INCON ASSESS: CPT | Performed by: NURSE PRACTITIONER

## 2024-05-23 PROCEDURE — 99213 OFFICE O/P EST LOW 20 MIN: CPT | Performed by: NURSE PRACTITIONER

## 2024-05-23 PROCEDURE — 3017F COLORECTAL CA SCREEN DOC REV: CPT | Performed by: NURSE PRACTITIONER

## 2024-05-23 PROCEDURE — G8417 CALC BMI ABV UP PARAM F/U: HCPCS | Performed by: NURSE PRACTITIONER

## 2024-05-23 PROCEDURE — 1036F TOBACCO NON-USER: CPT | Performed by: NURSE PRACTITIONER

## 2024-05-23 PROCEDURE — 1123F ACP DISCUSS/DSCN MKR DOCD: CPT | Performed by: NURSE PRACTITIONER

## 2024-05-23 RX ORDER — CIPROFLOXACIN 500 MG/1
500 TABLET, FILM COATED ORAL 2 TIMES DAILY
Qty: 20 TABLET | Refills: 0 | Status: SHIPPED | OUTPATIENT
Start: 2024-05-23 | End: 2024-06-02

## 2024-05-23 ASSESSMENT — PATIENT HEALTH QUESTIONNAIRE - PHQ9
5. POOR APPETITE OR OVEREATING: NOT AT ALL
6. FEELING BAD ABOUT YOURSELF - OR THAT YOU ARE A FAILURE OR HAVE LET YOURSELF OR YOUR FAMILY DOWN: NOT AT ALL
4. FEELING TIRED OR HAVING LITTLE ENERGY: NOT AT ALL
10. IF YOU CHECKED OFF ANY PROBLEMS, HOW DIFFICULT HAVE THESE PROBLEMS MADE IT FOR YOU TO DO YOUR WORK, TAKE CARE OF THINGS AT HOME, OR GET ALONG WITH OTHER PEOPLE: NOT DIFFICULT AT ALL
8. MOVING OR SPEAKING SO SLOWLY THAT OTHER PEOPLE COULD HAVE NOTICED. OR THE OPPOSITE, BEING SO FIGETY OR RESTLESS THAT YOU HAVE BEEN MOVING AROUND A LOT MORE THAN USUAL: NOT AT ALL
7. TROUBLE CONCENTRATING ON THINGS, SUCH AS READING THE NEWSPAPER OR WATCHING TELEVISION: NOT AT ALL
SUM OF ALL RESPONSES TO PHQ QUESTIONS 1-9: 0
SUM OF ALL RESPONSES TO PHQ9 QUESTIONS 1 & 2: 0
1. LITTLE INTEREST OR PLEASURE IN DOING THINGS: NOT AT ALL
9. THOUGHTS THAT YOU WOULD BE BETTER OFF DEAD, OR OF HURTING YOURSELF: NOT AT ALL
2. FEELING DOWN, DEPRESSED OR HOPELESS: NOT AT ALL
SUM OF ALL RESPONSES TO PHQ QUESTIONS 1-9: 0
3. TROUBLE FALLING OR STAYING ASLEEP: NOT AT ALL
SUM OF ALL RESPONSES TO PHQ QUESTIONS 1-9: 0
SUM OF ALL RESPONSES TO PHQ QUESTIONS 1-9: 0

## 2024-05-23 NOTE — PROGRESS NOTES
\"Have you been to the ER, urgent care clinic since your last visit?  Hospitalized since your last visit?\"    NO    “Have you seen or consulted any other health care providers outside of [facility] since your last visit?”    NO    Have you had a mammogram?”       Date of last Mammogram: 9/30/2021             Click Here for Release of Records Request      Chief Complaint   Patient presents with    Urinary Tract Infection         Vitals:    05/23/24 1520   BP: 134/80   Pulse: 79   Resp: 18   Temp: 98 °F (36.7 °C)   SpO2: 98%

## 2024-05-24 LAB
BACTERIA SPEC CULT: NORMAL
CC UR VC: NORMAL
SERVICE CMNT-IMP: NORMAL

## 2024-05-24 NOTE — PROGRESS NOTES
Subjective:      Tyrone Berger is a 63 y.o. female who complains of dysuria for several days.  Patient also complains of  completing course of antibiotics and still not free of symptoms . Patient denies vaginal discharge.  Patient does have a history of recurrent UTI.  Patient does not have a history of pyelonephritis.  Past Medical History:   Diagnosis Date    ADHD     Allergies     Anemia     Anxiety     GERD (gastroesophageal reflux disease)     Hyperthyroidism     hypothyroid    Urticaria      Patient Active Problem List    Diagnosis Date Noted    Iron deficiency anemia secondary to inadequate dietary iron intake 04/24/2023    Osteoarthritis of right knee 09/19/2022    Pes anserinus bursitis of right knee 09/19/2022    Right knee pain 09/19/2022    Allergic rhinitis 02/22/2021    Right ear pain 09/24/2020    Herpes zoster of eye 09/24/2020    Acute on chronic anemia 04/29/2020    MDD (major depressive disorder), recurrent severe, without psychosis (HCC) 06/15/2017    Neck injury 09/30/2014    Vitamin D deficiency 05/23/2012    Hypothyroid 05/23/2012    Iron deficiency anemia 05/23/2012    Insomnia 05/23/2012     Past Surgical History:   Procedure Laterality Date    COLONOSCOPY  2009    COLONOSCOPY N/A 3/18/2022    COLONOSCOPY WITH POSSIBLE POLYPECTOMY, EGD WITH POSSIBLE BIOPSY performed by Ramila Hooker MD at [facility] MAIN OR     Family History   Problem Relation Age of Onset    Rheum Arthritis Paternal Aunt     Cancer Paternal Uncle 70        prostate ?    Cancer Maternal Uncle 70        prostate    Cancer Maternal Aunt         unknown    Alcohol Abuse Sister     Heart Disease Mother         open heart surgery with valve replacement    Alcohol Abuse Brother     Alcohol Abuse Paternal Grandfather     Alcohol Abuse Father     Dementia Father      Social History     Socioeconomic History    Marital status:      Spouse name: None    Number of children: None    Years of education: 16    Highest education

## 2024-06-17 DIAGNOSIS — F90.2 ATTENTION DEFICIT HYPERACTIVITY DISORDER (ADHD), COMBINED TYPE: ICD-10-CM

## 2024-06-17 NOTE — TELEPHONE ENCOUNTER
From: Tyrone Berger  To: Office of Ria Ortiz  Sent: 6/17/2024 6:31 AM EDT  Subject: Medication Renewal Request    Refills have been requested for the following medications:     levothyroxine (SYNTHROID) 75 MCG tablet [Ria Ortiz, ANABEL - NP]     lisdexamfetamine (VYVANSE) 50 MG capsule [Ria Ortiz, ANABEL - NP]    [address] P [phone] - F [phone]

## 2024-06-17 NOTE — TELEPHONE ENCOUNTER
Patient requesting refill on     Requested Prescriptions     Pending Prescriptions Disp Refills    levothyroxine (SYNTHROID) 75 MCG tablet 90 tablet 0     Sig: Take 1 tablet by mouth Daily    lisdexamfetamine (VYVANSE) 50 MG capsule 30 capsule 0     Sig: Take 1 capsule by mouth daily for 30 days. Max Daily Amount: 50 mg        Last OV Visit 5/23/24

## 2024-06-18 ENCOUNTER — OFFICE VISIT (OUTPATIENT)
Age: 64
End: 2024-06-18
Payer: MEDICARE

## 2024-06-18 VITALS
BODY MASS INDEX: 30.37 KG/M2 | SYSTOLIC BLOOD PRESSURE: 112 MMHG | RESPIRATION RATE: 22 BRPM | HEART RATE: 81 BPM | OXYGEN SATURATION: 96 % | TEMPERATURE: 98.6 F | WEIGHT: 189 LBS | HEIGHT: 66 IN | DIASTOLIC BLOOD PRESSURE: 70 MMHG

## 2024-06-18 DIAGNOSIS — K92.1 TARRY STOOLS: Primary | ICD-10-CM

## 2024-06-18 DIAGNOSIS — D50.8 IRON DEFICIENCY ANEMIA SECONDARY TO INADEQUATE DIETARY IRON INTAKE: ICD-10-CM

## 2024-06-18 PROCEDURE — 1123F ACP DISCUSS/DSCN MKR DOCD: CPT | Performed by: NURSE PRACTITIONER

## 2024-06-18 PROCEDURE — G8400 PT W/DXA NO RESULTS DOC: HCPCS | Performed by: NURSE PRACTITIONER

## 2024-06-18 PROCEDURE — G8417 CALC BMI ABV UP PARAM F/U: HCPCS | Performed by: NURSE PRACTITIONER

## 2024-06-18 PROCEDURE — G8427 DOCREV CUR MEDS BY ELIG CLIN: HCPCS | Performed by: NURSE PRACTITIONER

## 2024-06-18 PROCEDURE — 1090F PRES/ABSN URINE INCON ASSESS: CPT | Performed by: NURSE PRACTITIONER

## 2024-06-18 PROCEDURE — 99214 OFFICE O/P EST MOD 30 MIN: CPT | Performed by: NURSE PRACTITIONER

## 2024-06-18 PROCEDURE — 1036F TOBACCO NON-USER: CPT | Performed by: NURSE PRACTITIONER

## 2024-06-18 PROCEDURE — 3017F COLORECTAL CA SCREEN DOC REV: CPT | Performed by: NURSE PRACTITIONER

## 2024-06-18 ASSESSMENT — PATIENT HEALTH QUESTIONNAIRE - PHQ9
4. FEELING TIRED OR HAVING LITTLE ENERGY: NOT AT ALL
SUM OF ALL RESPONSES TO PHQ QUESTIONS 1-9: 0
SUM OF ALL RESPONSES TO PHQ QUESTIONS 1-9: 0
SUM OF ALL RESPONSES TO PHQ9 QUESTIONS 1 & 2: 0
8. MOVING OR SPEAKING SO SLOWLY THAT OTHER PEOPLE COULD HAVE NOTICED. OR THE OPPOSITE, BEING SO FIGETY OR RESTLESS THAT YOU HAVE BEEN MOVING AROUND A LOT MORE THAN USUAL: NOT AT ALL
9. THOUGHTS THAT YOU WOULD BE BETTER OFF DEAD, OR OF HURTING YOURSELF: NOT AT ALL
6. FEELING BAD ABOUT YOURSELF - OR THAT YOU ARE A FAILURE OR HAVE LET YOURSELF OR YOUR FAMILY DOWN: NOT AT ALL
SUM OF ALL RESPONSES TO PHQ QUESTIONS 1-9: 0
SUM OF ALL RESPONSES TO PHQ QUESTIONS 1-9: 0
1. LITTLE INTEREST OR PLEASURE IN DOING THINGS: NOT AT ALL
3. TROUBLE FALLING OR STAYING ASLEEP: NOT AT ALL
7. TROUBLE CONCENTRATING ON THINGS, SUCH AS READING THE NEWSPAPER OR WATCHING TELEVISION: NOT AT ALL
5. POOR APPETITE OR OVEREATING: NOT AT ALL
2. FEELING DOWN, DEPRESSED OR HOPELESS: NOT AT ALL
10. IF YOU CHECKED OFF ANY PROBLEMS, HOW DIFFICULT HAVE THESE PROBLEMS MADE IT FOR YOU TO DO YOUR WORK, TAKE CARE OF THINGS AT HOME, OR GET ALONG WITH OTHER PEOPLE: NOT DIFFICULT AT ALL

## 2024-06-19 RX ORDER — LEVOTHYROXINE SODIUM 0.07 MG/1
75 TABLET ORAL DAILY
Qty: 90 TABLET | Refills: 0 | Status: SHIPPED | OUTPATIENT
Start: 2024-06-19

## 2024-06-19 RX ORDER — LISDEXAMFETAMINE DIMESYLATE 50 MG/1
50 CAPSULE ORAL DAILY
Qty: 30 CAPSULE | Refills: 0 | Status: SHIPPED | OUTPATIENT
Start: 2024-06-19 | End: 2024-07-19

## 2024-06-19 NOTE — PROGRESS NOTES
\"Have you been to the ER, urgent care clinic since your last visit?  Hospitalized since your last visit?\"    NO    “Have you seen or consulted any other health care providers outside of [facility] since your last visit?”    NO    Have you had a mammogram?”       Date of last Mammogram: 9/30/2021             Click Here for Release of Records Request      Chief Complaint   Patient presents with    lab results    Stool Color Change     tar         Vitals:    06/18/24 1514   BP: 112/70   Pulse: 81   Resp: 22   Temp: 98.6 °F (37 °C)   SpO2: 96%     
Nitrofurantoin Palpitations         Current Outpatient Medications:     albuterol sulfate HFA (PROVENTIL HFA) 108 (90 Base) MCG/ACT inhaler, Inhale 2 puffs into the lungs every 6 hours as needed for Wheezing, Disp: 18 g, Rfl: 3    venlafaxine (EFFEXOR XR) 75 MG extended release capsule, Take 1 capsule by mouth daily, Disp: 90 capsule, Rfl: 0    amoxicillin (AMOXIL) 250 MG capsule, Take 1 capsule by mouth daily, Disp: 90 capsule, Rfl: 3    levothyroxine (SYNTHROID) 75 MCG tablet, Take 1 tablet by mouth Daily, Disp: 90 tablet, Rfl: 0    fluticasone (FLONASE) 50 MCG/ACT nasal spray, 2 sprays by Nasal route daily, Disp: 16 g, Rfl: 1    omeprazole (PRILOSEC) 40 MG delayed release capsule, Take 1 capsule by mouth daily, Disp: 90 capsule, Rfl: 0    furosemide (LASIX) 20 MG tablet, Take 1 tablet by mouth daily as needed (Daily as needed), Disp: 60 tablet, Rfl: 0    acetaminophen (TYLENOL) 500 MG CAPS capsule, Take 2 capsules by mouth as needed, Disp: , Rfl:     ascorbic acid (VITAMIN C) 500 MG tablet, Take 2 tablets by mouth daily, Disp: , Rfl:     ibuprofen (ADVIL;MOTRIN) 200 MG tablet, Take 1 tablet by mouth, Disp: , Rfl:     lisdexamfetamine (VYVANSE) 50 MG capsule, Take 1 capsule by mouth daily for 30 days. Max Daily Amount: 50 mg, Disp: 30 capsule, Rfl: 0    Past Medical History:   Diagnosis Date    ADHD     Allergies     Anemia     Anxiety     GERD (gastroesophageal reflux disease)     Hyperthyroidism     hypothyroid    Urticaria        Past Surgical History:   Procedure Laterality Date    COLONOSCOPY  2009    COLONOSCOPY N/A 3/18/2022    COLONOSCOPY WITH POSSIBLE POLYPECTOMY, EGD WITH POSSIBLE BIOPSY performed by Ramila Hooker MD at [facility] MAIN OR       Social History     Tobacco Use   Smoking Status Never    Passive exposure: Never   Smokeless Tobacco Never       Social History     Socioeconomic History    Marital status:      Spouse name: None    Number of children: None    Years of education: 16    Highest

## 2024-07-18 DIAGNOSIS — F90.2 ATTENTION DEFICIT HYPERACTIVITY DISORDER (ADHD), COMBINED TYPE: ICD-10-CM

## 2024-07-18 RX ORDER — LISDEXAMFETAMINE DIMESYLATE 50 MG/1
50 CAPSULE ORAL DAILY
Qty: 30 CAPSULE | Refills: 0 | Status: SHIPPED | OUTPATIENT
Start: 2024-07-18 | End: 2024-08-17

## 2024-08-05 RX ORDER — VENLAFAXINE HYDROCHLORIDE 75 MG/1
75 CAPSULE, EXTENDED RELEASE ORAL DAILY
Qty: 90 CAPSULE | Refills: 3 | Status: SHIPPED | OUTPATIENT
Start: 2024-08-05

## 2024-08-05 NOTE — TELEPHONE ENCOUNTER
Patient requesting refill on     Requested Prescriptions     Pending Prescriptions Disp Refills    venlafaxine (EFFEXOR XR) 75 MG extended release capsule 90 capsule 0     Sig: Take 1 capsule by mouth daily        Last OV 6/18/2024

## 2024-08-05 NOTE — TELEPHONE ENCOUNTER
From: Tyrone Berger  To: Office of Ria Ortiz  Sent: 8/3/2024 8:52 AM EDT  Subject: Medication Renewal Request    Refills have been requested for the following medications:     venlafaxine (EFFEXOR XR) 75 MG extended release capsule [Ria Ortiz, APRN - NP]    Preferred pharmacy: [facility] [address] [phone] - F [phone]

## 2024-08-12 ENCOUNTER — OFFICE VISIT (OUTPATIENT)
Age: 64
End: 2024-08-12
Payer: MEDICARE

## 2024-08-12 ENCOUNTER — PATIENT MESSAGE (OUTPATIENT)
Age: 64
End: 2024-08-12

## 2024-08-12 VITALS
OXYGEN SATURATION: 97 % | HEIGHT: 65 IN | SYSTOLIC BLOOD PRESSURE: 120 MMHG | BODY MASS INDEX: 31.99 KG/M2 | HEART RATE: 70 BPM | WEIGHT: 192 LBS | DIASTOLIC BLOOD PRESSURE: 80 MMHG | TEMPERATURE: 98.2 F

## 2024-08-12 DIAGNOSIS — L02.91 ABSCESS: Primary | ICD-10-CM

## 2024-08-12 PROCEDURE — G8417 CALC BMI ABV UP PARAM F/U: HCPCS | Performed by: NURSE PRACTITIONER

## 2024-08-12 PROCEDURE — G8400 PT W/DXA NO RESULTS DOC: HCPCS | Performed by: NURSE PRACTITIONER

## 2024-08-12 PROCEDURE — 1090F PRES/ABSN URINE INCON ASSESS: CPT | Performed by: NURSE PRACTITIONER

## 2024-08-12 PROCEDURE — 99213 OFFICE O/P EST LOW 20 MIN: CPT | Performed by: NURSE PRACTITIONER

## 2024-08-12 PROCEDURE — 3017F COLORECTAL CA SCREEN DOC REV: CPT | Performed by: NURSE PRACTITIONER

## 2024-08-12 PROCEDURE — G8427 DOCREV CUR MEDS BY ELIG CLIN: HCPCS | Performed by: NURSE PRACTITIONER

## 2024-08-12 PROCEDURE — 1036F TOBACCO NON-USER: CPT | Performed by: NURSE PRACTITIONER

## 2024-08-12 PROCEDURE — 1123F ACP DISCUSS/DSCN MKR DOCD: CPT | Performed by: NURSE PRACTITIONER

## 2024-08-12 RX ORDER — LEVOTHYROXINE SODIUM 0.07 MG/1
75 TABLET ORAL DAILY
Qty: 90 TABLET | Refills: 0 | Status: SHIPPED | OUTPATIENT
Start: 2024-08-12

## 2024-08-12 RX ORDER — OMEPRAZOLE 40 MG/1
40 CAPSULE, DELAYED RELEASE ORAL DAILY
Qty: 90 CAPSULE | Refills: 1 | Status: SHIPPED | OUTPATIENT
Start: 2024-08-12

## 2024-08-12 ASSESSMENT — PATIENT HEALTH QUESTIONNAIRE - PHQ9
1. LITTLE INTEREST OR PLEASURE IN DOING THINGS: NOT AT ALL
8. MOVING OR SPEAKING SO SLOWLY THAT OTHER PEOPLE COULD HAVE NOTICED. OR THE OPPOSITE, BEING SO FIGETY OR RESTLESS THAT YOU HAVE BEEN MOVING AROUND A LOT MORE THAN USUAL: NOT AT ALL
6. FEELING BAD ABOUT YOURSELF - OR THAT YOU ARE A FAILURE OR HAVE LET YOURSELF OR YOUR FAMILY DOWN: NOT AT ALL
SUM OF ALL RESPONSES TO PHQ9 QUESTIONS 1 & 2: 0
SUM OF ALL RESPONSES TO PHQ QUESTIONS 1-9: 0
SUM OF ALL RESPONSES TO PHQ QUESTIONS 1-9: 0
5. POOR APPETITE OR OVEREATING: NOT AT ALL
3. TROUBLE FALLING OR STAYING ASLEEP: NOT AT ALL
2. FEELING DOWN, DEPRESSED OR HOPELESS: NOT AT ALL
SUM OF ALL RESPONSES TO PHQ QUESTIONS 1-9: 0
7. TROUBLE CONCENTRATING ON THINGS, SUCH AS READING THE NEWSPAPER OR WATCHING TELEVISION: NOT AT ALL
4. FEELING TIRED OR HAVING LITTLE ENERGY: NOT AT ALL
SUM OF ALL RESPONSES TO PHQ QUESTIONS 1-9: 0
10. IF YOU CHECKED OFF ANY PROBLEMS, HOW DIFFICULT HAVE THESE PROBLEMS MADE IT FOR YOU TO DO YOUR WORK, TAKE CARE OF THINGS AT HOME, OR GET ALONG WITH OTHER PEOPLE: NOT DIFFICULT AT ALL
9. THOUGHTS THAT YOU WOULD BE BETTER OFF DEAD, OR OF HURTING YOURSELF: NOT AT ALL

## 2024-08-12 NOTE — PROGRESS NOTES
\"Have you been to the ER, urgent care clinic since your last visit?  Hospitalized since your last visit?\"    NO    “Have you seen or consulted any other health care providers outside of [facility] since your last visit?”    NOhematologist DR Helga Prater  [place]    Have you had a mammogram?”       Date of last Mammogram: 9/30/2021             Click Here for Release of Records Request      Chief Complaint   Patient presents with    Hair/Scalp Problem    Weight Management         Vitals:    08/12/24 1617   BP: 120/80   Pulse: 70   Temp: 98.2 °F (36.8 °C)   SpO2: 97%

## 2024-08-13 RX ORDER — CIPROFLOXACIN 500 MG/1
500 TABLET, FILM COATED ORAL 2 TIMES DAILY
Qty: 20 TABLET | Refills: 0 | Status: SHIPPED | OUTPATIENT
Start: 2024-08-13 | End: 2024-08-23

## 2024-08-14 NOTE — PROGRESS NOTES
Subjective:       Tyrone Berger is a 64 y.o. female who presents for evaluation of a probable cutaneous abscess. Lesion is located in the scalp. Onset was several days ago. Symptoms have waxed and waned but are worse overall.    Abscess has associated symptoms of pain. Patient does have previous history of cutaneous abscesses. Patient does not have diabetes.    PMH: Hyperthyroidism  Past Medical History:   Diagnosis Date    ADHD     Allergies     Anemia     Anxiety     GERD (gastroesophageal reflux disease)     Hyperthyroidism     hypothyroid    Urticaria      Patient Active Problem List    Diagnosis Date Noted    Iron deficiency anemia secondary to inadequate dietary iron intake 04/24/2023    Osteoarthritis of right knee 09/19/2022    Pes anserinus bursitis of right knee 09/19/2022    Right knee pain 09/19/2022    Allergic rhinitis 02/22/2021    Right ear pain 09/24/2020    Herpes zoster of eye 09/24/2020    Acute on chronic anemia 04/29/2020    MDD (major depressive disorder), recurrent severe, without psychosis (HCC) 06/15/2017    Neck injury 09/30/2014    Vitamin D deficiency 05/23/2012    Hypothyroid 05/23/2012    Iron deficiency anemia 05/23/2012    Insomnia 05/23/2012     Past Surgical History:   Procedure Laterality Date    COLONOSCOPY  2009    COLONOSCOPY N/A 3/18/2022    COLONOSCOPY WITH POSSIBLE POLYPECTOMY, EGD WITH POSSIBLE BIOPSY performed by Ramila Hooker MD at [facility] MAIN OR     Family History   Problem Relation Age of Onset    Rheum Arthritis Paternal Aunt     Cancer Paternal Uncle 70        prostate ?    Cancer Maternal Uncle 70        prostate    Cancer Maternal Aunt         unknown    Alcohol Abuse Sister     Heart Disease Mother         open heart surgery with valve replacement    Alcohol Abuse Brother     Alcohol Abuse Paternal Grandfather     Alcohol Abuse Father     Dementia Father      Social History     Socioeconomic History    Marital status:      Spouse name: None    Number of

## 2024-08-16 LAB
BACTERIA SPEC AEROBE CULT: ABNORMAL
BACTERIA SPEC ANAEROBE CULT: ABNORMAL

## 2024-08-16 RX ORDER — AMOXICILLIN AND CLAVULANATE POTASSIUM 875; 125 MG/1; MG/1
1 TABLET, FILM COATED ORAL 2 TIMES DAILY
Qty: 20 TABLET | Refills: 0 | Status: SHIPPED | OUTPATIENT
Start: 2024-08-16 | End: 2024-08-26

## 2024-08-21 DIAGNOSIS — F90.2 ATTENTION DEFICIT HYPERACTIVITY DISORDER (ADHD), COMBINED TYPE: ICD-10-CM

## 2024-08-21 RX ORDER — LISDEXAMFETAMINE DIMESYLATE 50 MG/1
50 CAPSULE ORAL DAILY
Qty: 30 CAPSULE | Refills: 0 | Status: SHIPPED | OUTPATIENT
Start: 2024-08-21 | End: 2024-09-20

## 2024-08-21 NOTE — TELEPHONE ENCOUNTER
Patient requesting refill on     Requested Prescriptions     Pending Prescriptions Disp Refills    lisdexamfetamine (VYVANSE) 50 MG capsule 30 capsule 0     Sig: Take 1 capsule by mouth daily for 30 days. Max Daily Amount: 50 mg        Last OV 8/12/2024

## 2024-09-19 ENCOUNTER — OFFICE VISIT (OUTPATIENT)
Age: 64
End: 2024-09-19
Payer: MEDICARE

## 2024-09-19 VITALS
HEIGHT: 65 IN | SYSTOLIC BLOOD PRESSURE: 132 MMHG | OXYGEN SATURATION: 99 % | HEART RATE: 51 BPM | DIASTOLIC BLOOD PRESSURE: 90 MMHG | TEMPERATURE: 97 F | WEIGHT: 192 LBS | BODY MASS INDEX: 31.99 KG/M2

## 2024-09-19 DIAGNOSIS — K92.1 TARRY STOOLS: ICD-10-CM

## 2024-09-19 DIAGNOSIS — E53.8 B12 DEFICIENCY: ICD-10-CM

## 2024-09-19 DIAGNOSIS — L98.9 LESION OF SKIN OF SCALP: ICD-10-CM

## 2024-09-19 DIAGNOSIS — D50.8 IRON DEFICIENCY ANEMIA SECONDARY TO INADEQUATE DIETARY IRON INTAKE: Primary | ICD-10-CM

## 2024-09-19 PROCEDURE — 1036F TOBACCO NON-USER: CPT | Performed by: NURSE PRACTITIONER

## 2024-09-19 PROCEDURE — 1123F ACP DISCUSS/DSCN MKR DOCD: CPT | Performed by: NURSE PRACTITIONER

## 2024-09-19 PROCEDURE — 3017F COLORECTAL CA SCREEN DOC REV: CPT | Performed by: NURSE PRACTITIONER

## 2024-09-19 PROCEDURE — 36415 COLL VENOUS BLD VENIPUNCTURE: CPT | Performed by: NURSE PRACTITIONER

## 2024-09-19 PROCEDURE — G8400 PT W/DXA NO RESULTS DOC: HCPCS | Performed by: NURSE PRACTITIONER

## 2024-09-19 PROCEDURE — 99214 OFFICE O/P EST MOD 30 MIN: CPT | Performed by: NURSE PRACTITIONER

## 2024-09-19 PROCEDURE — G8417 CALC BMI ABV UP PARAM F/U: HCPCS | Performed by: NURSE PRACTITIONER

## 2024-09-19 PROCEDURE — 1090F PRES/ABSN URINE INCON ASSESS: CPT | Performed by: NURSE PRACTITIONER

## 2024-09-19 PROCEDURE — G8427 DOCREV CUR MEDS BY ELIG CLIN: HCPCS | Performed by: NURSE PRACTITIONER

## 2024-09-19 ASSESSMENT — PATIENT HEALTH QUESTIONNAIRE - PHQ9
SUM OF ALL RESPONSES TO PHQ QUESTIONS 1-9: 0
10. IF YOU CHECKED OFF ANY PROBLEMS, HOW DIFFICULT HAVE THESE PROBLEMS MADE IT FOR YOU TO DO YOUR WORK, TAKE CARE OF THINGS AT HOME, OR GET ALONG WITH OTHER PEOPLE: NOT DIFFICULT AT ALL
SUM OF ALL RESPONSES TO PHQ QUESTIONS 1-9: 0
SUM OF ALL RESPONSES TO PHQ QUESTIONS 1-9: 0
5. POOR APPETITE OR OVEREATING: NOT AT ALL
3. TROUBLE FALLING OR STAYING ASLEEP: NOT AT ALL
4. FEELING TIRED OR HAVING LITTLE ENERGY: NOT AT ALL
SUM OF ALL RESPONSES TO PHQ QUESTIONS 1-9: 0
1. LITTLE INTEREST OR PLEASURE IN DOING THINGS: NOT AT ALL
SUM OF ALL RESPONSES TO PHQ9 QUESTIONS 1 & 2: 0
8. MOVING OR SPEAKING SO SLOWLY THAT OTHER PEOPLE COULD HAVE NOTICED. OR THE OPPOSITE, BEING SO FIGETY OR RESTLESS THAT YOU HAVE BEEN MOVING AROUND A LOT MORE THAN USUAL: NOT AT ALL
SUM OF ALL RESPONSES TO PHQ QUESTIONS 1-9: 0
2. FEELING DOWN, DEPRESSED OR HOPELESS: NOT AT ALL
SUM OF ALL RESPONSES TO PHQ QUESTIONS 1-9: 0
7. TROUBLE CONCENTRATING ON THINGS, SUCH AS READING THE NEWSPAPER OR WATCHING TELEVISION: NOT AT ALL
10. IF YOU CHECKED OFF ANY PROBLEMS, HOW DIFFICULT HAVE THESE PROBLEMS MADE IT FOR YOU TO DO YOUR WORK, TAKE CARE OF THINGS AT HOME, OR GET ALONG WITH OTHER PEOPLE: NOT DIFFICULT AT ALL
6. FEELING BAD ABOUT YOURSELF - OR THAT YOU ARE A FAILURE OR HAVE LET YOURSELF OR YOUR FAMILY DOWN: NOT AT ALL
SUM OF ALL RESPONSES TO PHQ QUESTIONS 1-9: 0
SUM OF ALL RESPONSES TO PHQ QUESTIONS 1-9: 0
8. MOVING OR SPEAKING SO SLOWLY THAT OTHER PEOPLE COULD HAVE NOTICED. OR THE OPPOSITE, BEING SO FIGETY OR RESTLESS THAT YOU HAVE BEEN MOVING AROUND A LOT MORE THAN USUAL: NOT AT ALL

## 2024-09-20 LAB
BASOPHILS # BLD: 0.1 K/UL (ref 0–0.1)
BASOPHILS NFR BLD: 1 % (ref 0–1)
COMMENT:: NORMAL
DIFFERENTIAL METHOD BLD: ABNORMAL
EOSINOPHIL # BLD: 0.1 K/UL (ref 0–0.4)
EOSINOPHIL NFR BLD: 2 % (ref 0–7)
ERYTHROCYTE [DISTWIDTH] IN BLOOD BY AUTOMATED COUNT: 26 % (ref 11.5–14.5)
FERRITIN SERPL-MCNC: 22 NG/ML (ref 26–388)
HCT VFR BLD AUTO: 38.1 % (ref 35–47)
HGB BLD-MCNC: 10.6 G/DL (ref 11.5–16)
IMM GRANULOCYTES # BLD AUTO: 0 K/UL (ref 0–0.04)
IMM GRANULOCYTES NFR BLD AUTO: 0 % (ref 0–0.5)
LYMPHOCYTES # BLD: 1 K/UL (ref 0.8–3.5)
LYMPHOCYTES NFR BLD: 21 % (ref 12–49)
MCH RBC QN AUTO: 25.5 PG (ref 26–34)
MCHC RBC AUTO-ENTMCNC: 27.8 G/DL (ref 30–36.5)
MCV RBC AUTO: 91.8 FL (ref 80–99)
MONOCYTES # BLD: 0.4 K/UL (ref 0–1)
MONOCYTES NFR BLD: 9 % (ref 5–13)
NEUTS SEG # BLD: 3.3 K/UL (ref 1.8–8)
NEUTS SEG NFR BLD: 67 % (ref 32–75)
NRBC # BLD: 0 K/UL (ref 0–0.01)
NRBC BLD-RTO: 0 PER 100 WBC
PLATELET # BLD AUTO: 332 K/UL (ref 150–400)
PMV BLD AUTO: 10.6 FL (ref 8.9–12.9)
RBC # BLD AUTO: 4.15 M/UL (ref 3.8–5.2)
RBC MORPH BLD: ABNORMAL
RBC MORPH BLD: ABNORMAL
SPECIMEN HOLD: NORMAL
WBC # BLD AUTO: 4.9 K/UL (ref 3.6–11)

## 2024-09-23 ENCOUNTER — TELEPHONE (OUTPATIENT)
Age: 64
End: 2024-09-23

## 2024-09-23 ENCOUNTER — PATIENT MESSAGE (OUTPATIENT)
Age: 64
End: 2024-09-23

## 2024-09-23 NOTE — TELEPHONE ENCOUNTER
Pt would like a call about her blood work she states her specalist wants to know why ticb was not

## 2024-09-24 ENCOUNTER — TELEPHONE (OUTPATIENT)
Age: 64
End: 2024-09-24

## 2024-09-24 LAB
IRON SATN MFR SERPL: 7 % (ref 20–50)
IRON SERPL-MCNC: 32 UG/DL (ref 35–150)
TIBC SERPL-MCNC: 430 UG/DL (ref 250–450)

## 2024-09-26 DIAGNOSIS — F90.2 ATTENTION DEFICIT HYPERACTIVITY DISORDER (ADHD), COMBINED TYPE: ICD-10-CM

## 2024-09-26 RX ORDER — LISDEXAMFETAMINE DIMESYLATE 50 MG/1
50 CAPSULE ORAL DAILY
Qty: 30 CAPSULE | Refills: 0 | Status: SHIPPED | OUTPATIENT
Start: 2024-09-26 | End: 2024-10-26

## 2024-09-27 LAB
H PYLORI AG STL QL IA: NEGATIVE
SPECIMEN SOURCE: NORMAL

## 2024-10-30 ENCOUNTER — PATIENT MESSAGE (OUTPATIENT)
Age: 64
End: 2024-10-30

## 2024-10-30 DIAGNOSIS — F90.2 ATTENTION DEFICIT HYPERACTIVITY DISORDER (ADHD), COMBINED TYPE: ICD-10-CM

## 2024-10-30 RX ORDER — LISDEXAMFETAMINE DIMESYLATE 50 MG/1
50 CAPSULE ORAL DAILY
Qty: 30 CAPSULE | Refills: 0 | Status: SHIPPED | OUTPATIENT
Start: 2024-10-30 | End: 2024-11-29

## 2024-10-30 RX ORDER — LISDEXAMFETAMINE DIMESYLATE 50 MG/1
50 CAPSULE ORAL DAILY
Qty: 30 CAPSULE | Refills: 0 | Status: CANCELLED | OUTPATIENT
Start: 2024-10-30 | End: 2024-11-29

## 2024-10-30 NOTE — TELEPHONE ENCOUNTER
Patient requesting refill on     Requested Prescriptions     Pending Prescriptions Disp Refills    lisdexamfetamine (VYVANSE) 50 MG capsule 30 capsule 0     Sig: Take 1 capsule by mouth daily for 30 days. Max Daily Amount: 50 mg        Last OV 09/19/2024

## 2024-11-11 ENCOUNTER — OFFICE VISIT (OUTPATIENT)
Age: 64
End: 2024-11-11
Payer: MEDICARE

## 2024-11-11 VITALS
WEIGHT: 195 LBS | TEMPERATURE: 97 F | RESPIRATION RATE: 20 BRPM | HEART RATE: 82 BPM | BODY MASS INDEX: 32.49 KG/M2 | DIASTOLIC BLOOD PRESSURE: 72 MMHG | OXYGEN SATURATION: 97 % | SYSTOLIC BLOOD PRESSURE: 130 MMHG | HEIGHT: 65 IN

## 2024-11-11 DIAGNOSIS — E53.8 B12 DEFICIENCY: ICD-10-CM

## 2024-11-11 DIAGNOSIS — D50.8 OTHER IRON DEFICIENCY ANEMIA: Primary | ICD-10-CM

## 2024-11-11 DIAGNOSIS — F90.2 ATTENTION DEFICIT HYPERACTIVITY DISORDER (ADHD), COMBINED TYPE: ICD-10-CM

## 2024-11-11 DIAGNOSIS — Z86.39 HISTORY OF ELEVATED GLUCOSE: ICD-10-CM

## 2024-11-11 PROCEDURE — 3017F COLORECTAL CA SCREEN DOC REV: CPT | Performed by: NURSE PRACTITIONER

## 2024-11-11 PROCEDURE — 1160F RVW MEDS BY RX/DR IN RCRD: CPT | Performed by: NURSE PRACTITIONER

## 2024-11-11 PROCEDURE — G8484 FLU IMMUNIZE NO ADMIN: HCPCS | Performed by: NURSE PRACTITIONER

## 2024-11-11 PROCEDURE — 1125F AMNT PAIN NOTED PAIN PRSNT: CPT | Performed by: NURSE PRACTITIONER

## 2024-11-11 PROCEDURE — 1159F MED LIST DOCD IN RCRD: CPT | Performed by: NURSE PRACTITIONER

## 2024-11-11 PROCEDURE — 36415 COLL VENOUS BLD VENIPUNCTURE: CPT | Performed by: NURSE PRACTITIONER

## 2024-11-11 PROCEDURE — G0438 PPPS, INITIAL VISIT: HCPCS | Performed by: NURSE PRACTITIONER

## 2024-11-11 PROCEDURE — 1123F ACP DISCUSS/DSCN MKR DOCD: CPT | Performed by: NURSE PRACTITIONER

## 2024-11-11 RX ORDER — VENLAFAXINE HYDROCHLORIDE 75 MG/1
75 CAPSULE, EXTENDED RELEASE ORAL DAILY
Qty: 90 CAPSULE | Refills: 3 | Status: CANCELLED | OUTPATIENT
Start: 2024-11-11

## 2024-11-11 RX ORDER — VENLAFAXINE HYDROCHLORIDE 75 MG/1
75 CAPSULE, EXTENDED RELEASE ORAL DAILY
Qty: 90 CAPSULE | Refills: 3 | Status: SHIPPED | OUTPATIENT
Start: 2024-11-11

## 2024-11-11 RX ORDER — VENLAFAXINE HYDROCHLORIDE 75 MG/1
75 CAPSULE, EXTENDED RELEASE ORAL DAILY
Qty: 30 CAPSULE | Refills: 0 | OUTPATIENT
Start: 2024-11-11

## 2024-11-11 ASSESSMENT — PATIENT HEALTH QUESTIONNAIRE - PHQ9
3. TROUBLE FALLING OR STAYING ASLEEP: NOT AT ALL
4. FEELING TIRED OR HAVING LITTLE ENERGY: NOT AT ALL
8. MOVING OR SPEAKING SO SLOWLY THAT OTHER PEOPLE COULD HAVE NOTICED. OR THE OPPOSITE, BEING SO FIGETY OR RESTLESS THAT YOU HAVE BEEN MOVING AROUND A LOT MORE THAN USUAL: NOT AT ALL
1. LITTLE INTEREST OR PLEASURE IN DOING THINGS: NOT AT ALL
SUM OF ALL RESPONSES TO PHQ QUESTIONS 1-9: 0
2. FEELING DOWN, DEPRESSED OR HOPELESS: NOT AT ALL
SUM OF ALL RESPONSES TO PHQ QUESTIONS 1-9: 0
7. TROUBLE CONCENTRATING ON THINGS, SUCH AS READING THE NEWSPAPER OR WATCHING TELEVISION: NOT AT ALL
9. THOUGHTS THAT YOU WOULD BE BETTER OFF DEAD, OR OF HURTING YOURSELF: NOT AT ALL
SUM OF ALL RESPONSES TO PHQ QUESTIONS 1-9: 0
10. IF YOU CHECKED OFF ANY PROBLEMS, HOW DIFFICULT HAVE THESE PROBLEMS MADE IT FOR YOU TO DO YOUR WORK, TAKE CARE OF THINGS AT HOME, OR GET ALONG WITH OTHER PEOPLE: NOT DIFFICULT AT ALL
SUM OF ALL RESPONSES TO PHQ QUESTIONS 1-9: 0
5. POOR APPETITE OR OVEREATING: NOT AT ALL
SUM OF ALL RESPONSES TO PHQ9 QUESTIONS 1 & 2: 0
6. FEELING BAD ABOUT YOURSELF - OR THAT YOU ARE A FAILURE OR HAVE LET YOURSELF OR YOUR FAMILY DOWN: NOT AT ALL

## 2024-11-11 ASSESSMENT — LIFESTYLE VARIABLES
HOW MANY STANDARD DRINKS CONTAINING ALCOHOL DO YOU HAVE ON A TYPICAL DAY: 1 OR 2
HOW OFTEN DO YOU HAVE A DRINK CONTAINING ALCOHOL: MONTHLY OR LESS

## 2024-11-11 NOTE — PROGRESS NOTES
Medicare Annual Wellness Visit    Tyrone Berger is here for Medicare AWV    Assessment & Plan   Other iron deficiency anemia  -     CBC with Auto Differential; Future  -     Ferritin; Future  -     Iron and TIBC; Future  -     Vitamin B12; Future  -     COLLECTION VENOUS BLOOD,VENIPUNCTURE; Future  B12 deficiency  -     CBC with Auto Differential; Future  -     Ferritin; Future  -     Iron and TIBC; Future  -     Vitamin B12; Future  -     COLLECTION VENOUS BLOOD,VENIPUNCTURE; Future  History of elevated glucose  -     Hemoglobin A1C; Future  Attention deficit hyperactivity disorder (ADHD), combined type  -     ToxAssure Select 13; Future    Recommendations for Preventive Services Due: see orders and patient instructions/AVS.  Recommended screening schedule for the next 5-10 years is provided to the patient in written form: see Patient Instructions/AVS.     No follow-ups on file.     Subjective   Labs for hematologist after iron infusion.    Patient's complete Health Risk Assessment and screening values have been reviewed and are found in Flowsheets. The following problems were reviewed today and where indicated follow up appointments were made and/or referrals ordered.    Positive Risk Factor Screenings with Interventions:             General HRA Questions:  Select all that apply: (!) New or Increased Pain, New or Increased Fatigue  Interventions - Pain:  See AVS for additional education material  Interventions Fatigue:  See AVS for additional education material      Inactivity:  On average, how many days per week do you engage in moderate to strenuous exercise (like a brisk walk)?: 0 days (!) Abnormal  On average, how many minutes do you engage in exercise at this level?: 0 min  Interventions:  See AVS for additional education material     Abnormal BMI (obese):  Body mass index is 32.45 kg/m². (!) Abnormal  Interventions:               Advanced Directives:  Do you have a Living Will?: (!)

## 2024-11-11 NOTE — PROGRESS NOTES
\"Have you been to the ER, urgent care clinic since your last visit?  Hospitalized since your last visit?\"    NO    “Have you seen or consulted any other health care providers outside of [facility] since your last visit?”    NO    Have you had a mammogram?”       Date of last Mammogram: 9/30/2021             Click Here for Release of Records Request      Chief Complaint   Patient presents with    Medicare AWV         Vitals:    11/11/24 1342   BP: 130/72   Pulse: 82   Resp: 20   Temp: 97 °F (36.1 °C)   SpO2: 97%

## 2024-11-12 LAB
BASOPHILS # BLD: 0 K/UL (ref 0–0.1)
BASOPHILS NFR BLD: 1 % (ref 0–1)
DIFFERENTIAL METHOD BLD: ABNORMAL
EOSINOPHIL # BLD: 0.1 K/UL (ref 0–0.4)
EOSINOPHIL NFR BLD: 2 % (ref 0–7)
ERYTHROCYTE [DISTWIDTH] IN BLOOD BY AUTOMATED COUNT: 21 % (ref 11.5–14.5)
EST. AVERAGE GLUCOSE BLD GHB EST-MCNC: 85 MG/DL
FERRITIN SERPL-MCNC: 204 NG/ML (ref 8–252)
HBA1C MFR BLD: 4.6 % (ref 4–5.6)
HCT VFR BLD AUTO: 41.8 % (ref 35–47)
HGB BLD-MCNC: 12.5 G/DL (ref 11.5–16)
IMM GRANULOCYTES # BLD AUTO: 0 K/UL (ref 0–0.04)
IMM GRANULOCYTES NFR BLD AUTO: 1 % (ref 0–0.5)
IRON SATN MFR SERPL: 11 % (ref 20–50)
IRON SERPL-MCNC: 44 UG/DL (ref 35–150)
LYMPHOCYTES # BLD: 1.2 K/UL (ref 0.8–3.5)
LYMPHOCYTES NFR BLD: 24 % (ref 12–49)
MCH RBC QN AUTO: 28.9 PG (ref 26–34)
MCHC RBC AUTO-ENTMCNC: 29.9 G/DL (ref 30–36.5)
MCV RBC AUTO: 96.5 FL (ref 80–99)
MONOCYTES # BLD: 0.3 K/UL (ref 0–1)
MONOCYTES NFR BLD: 7 % (ref 5–13)
NEUTS SEG # BLD: 3.2 K/UL (ref 1.8–8)
NEUTS SEG NFR BLD: 65 % (ref 32–75)
NRBC # BLD: 0 K/UL (ref 0–0.01)
NRBC BLD-RTO: 0 PER 100 WBC
PLATELET # BLD AUTO: 298 K/UL (ref 150–400)
PMV BLD AUTO: 10.4 FL (ref 8.9–12.9)
RBC # BLD AUTO: 4.33 M/UL (ref 3.8–5.2)
RBC MORPH BLD: ABNORMAL
RBC MORPH BLD: ABNORMAL
TIBC SERPL-MCNC: 394 UG/DL (ref 250–450)
VIT B12 SERPL-MCNC: 1330 PG/ML (ref 193–986)
WBC # BLD AUTO: 4.8 K/UL (ref 3.6–11)

## 2024-11-15 LAB
DRUG NAME: NORMAL
DRUGS UR: NORMAL
MED LIST NOT PROVIDED?: NO
MED LIST ON REQUISITION?: NO
MED LIST ON SEPARATE FORM?: NO
NO MEDICATION USE?: NO
RX NORM CODE: NORMAL
RX NORM SOURCE: NORMAL
RX NORM TEXT: NORMAL
SEQUENCE NUMBER: NORMAL

## 2024-11-29 DIAGNOSIS — F90.2 ATTENTION DEFICIT HYPERACTIVITY DISORDER (ADHD), COMBINED TYPE: ICD-10-CM

## 2024-12-01 RX ORDER — LISDEXAMFETAMINE DIMESYLATE 50 MG/1
50 CAPSULE ORAL DAILY
Qty: 30 CAPSULE | Refills: 0 | Status: SHIPPED | OUTPATIENT
Start: 2024-12-01 | End: 2024-12-31

## 2024-12-12 ENCOUNTER — OFFICE VISIT (OUTPATIENT)
Age: 64
End: 2024-12-12
Payer: MEDICARE

## 2024-12-12 ENCOUNTER — HOSPITAL ENCOUNTER (OUTPATIENT)
Facility: HOSPITAL | Age: 64
Discharge: HOME | End: 2024-12-15
Payer: MEDICARE

## 2024-12-12 VITALS
DIASTOLIC BLOOD PRESSURE: 70 MMHG | OXYGEN SATURATION: 98 % | SYSTOLIC BLOOD PRESSURE: 112 MMHG | WEIGHT: 194 LBS | TEMPERATURE: 97.7 F | HEIGHT: 65 IN | RESPIRATION RATE: 20 BRPM | HEART RATE: 88 BPM | BODY MASS INDEX: 32.32 KG/M2

## 2024-12-12 DIAGNOSIS — S93.401A SPRAIN OF RIGHT ANKLE, UNSPECIFIED LIGAMENT, INITIAL ENCOUNTER: ICD-10-CM

## 2024-12-12 DIAGNOSIS — G89.29 CHRONIC PAIN OF RIGHT KNEE: ICD-10-CM

## 2024-12-12 DIAGNOSIS — M25.561 CHRONIC PAIN OF RIGHT KNEE: Primary | ICD-10-CM

## 2024-12-12 DIAGNOSIS — L28.1 PRURIGO NODULARIS: ICD-10-CM

## 2024-12-12 DIAGNOSIS — M25.561 CHRONIC PAIN OF RIGHT KNEE: ICD-10-CM

## 2024-12-12 DIAGNOSIS — G89.29 CHRONIC PAIN OF RIGHT KNEE: Primary | ICD-10-CM

## 2024-12-12 PROCEDURE — G8484 FLU IMMUNIZE NO ADMIN: HCPCS | Performed by: NURSE PRACTITIONER

## 2024-12-12 PROCEDURE — G8427 DOCREV CUR MEDS BY ELIG CLIN: HCPCS | Performed by: NURSE PRACTITIONER

## 2024-12-12 PROCEDURE — 1159F MED LIST DOCD IN RCRD: CPT | Performed by: NURSE PRACTITIONER

## 2024-12-12 PROCEDURE — 73630 X-RAY EXAM OF FOOT: CPT

## 2024-12-12 PROCEDURE — 1160F RVW MEDS BY RX/DR IN RCRD: CPT | Performed by: NURSE PRACTITIONER

## 2024-12-12 PROCEDURE — 1125F AMNT PAIN NOTED PAIN PRSNT: CPT | Performed by: NURSE PRACTITIONER

## 2024-12-12 PROCEDURE — 73610 X-RAY EXAM OF ANKLE: CPT

## 2024-12-12 PROCEDURE — 99214 OFFICE O/P EST MOD 30 MIN: CPT | Performed by: NURSE PRACTITIONER

## 2024-12-12 PROCEDURE — 1123F ACP DISCUSS/DSCN MKR DOCD: CPT | Performed by: NURSE PRACTITIONER

## 2024-12-12 PROCEDURE — G8400 PT W/DXA NO RESULTS DOC: HCPCS | Performed by: NURSE PRACTITIONER

## 2024-12-12 PROCEDURE — 1090F PRES/ABSN URINE INCON ASSESS: CPT | Performed by: NURSE PRACTITIONER

## 2024-12-12 PROCEDURE — G8417 CALC BMI ABV UP PARAM F/U: HCPCS | Performed by: NURSE PRACTITIONER

## 2024-12-12 PROCEDURE — 1036F TOBACCO NON-USER: CPT | Performed by: NURSE PRACTITIONER

## 2024-12-12 PROCEDURE — 73562 X-RAY EXAM OF KNEE 3: CPT

## 2024-12-12 PROCEDURE — 3017F COLORECTAL CA SCREEN DOC REV: CPT | Performed by: NURSE PRACTITIONER

## 2024-12-12 RX ORDER — CIPROFLOXACIN 500 MG/1
500 TABLET, FILM COATED ORAL 2 TIMES DAILY
Qty: 20 TABLET | Refills: 0 | Status: SHIPPED | OUTPATIENT
Start: 2024-12-12 | End: 2024-12-22

## 2024-12-12 ASSESSMENT — PATIENT HEALTH QUESTIONNAIRE - PHQ9
8. MOVING OR SPEAKING SO SLOWLY THAT OTHER PEOPLE COULD HAVE NOTICED. OR THE OPPOSITE, BEING SO FIGETY OR RESTLESS THAT YOU HAVE BEEN MOVING AROUND A LOT MORE THAN USUAL: NOT AT ALL
3. TROUBLE FALLING OR STAYING ASLEEP: NOT AT ALL
4. FEELING TIRED OR HAVING LITTLE ENERGY: NOT AT ALL
10. IF YOU CHECKED OFF ANY PROBLEMS, HOW DIFFICULT HAVE THESE PROBLEMS MADE IT FOR YOU TO DO YOUR WORK, TAKE CARE OF THINGS AT HOME, OR GET ALONG WITH OTHER PEOPLE: NOT DIFFICULT AT ALL
6. FEELING BAD ABOUT YOURSELF - OR THAT YOU ARE A FAILURE OR HAVE LET YOURSELF OR YOUR FAMILY DOWN: NOT AT ALL
1. LITTLE INTEREST OR PLEASURE IN DOING THINGS: NOT AT ALL
SUM OF ALL RESPONSES TO PHQ QUESTIONS 1-9: 0
SUM OF ALL RESPONSES TO PHQ QUESTIONS 1-9: 0
5. POOR APPETITE OR OVEREATING: NOT AT ALL
SUM OF ALL RESPONSES TO PHQ QUESTIONS 1-9: 0
2. FEELING DOWN, DEPRESSED OR HOPELESS: NOT AT ALL
SUM OF ALL RESPONSES TO PHQ9 QUESTIONS 1 & 2: 0
7. TROUBLE CONCENTRATING ON THINGS, SUCH AS READING THE NEWSPAPER OR WATCHING TELEVISION: NOT AT ALL
SUM OF ALL RESPONSES TO PHQ QUESTIONS 1-9: 0
9. THOUGHTS THAT YOU WOULD BE BETTER OFF DEAD, OR OF HURTING YOURSELF: NOT AT ALL

## 2024-12-12 NOTE — PROGRESS NOTES
\"Have you been to the ER, urgent care clinic since your last visit?  Hospitalized since your last visit?\"    NO    “Have you seen or consulted any other health care providers outside of [facility] since your last visit?”    NO    Have you had a mammogram?”       Date of last Mammogram: 9/30/2021             Click Here for Release of Records Request      Chief Complaint   Patient presents with    Swelling     Right ankle  from sprain         Vitals:    12/12/24 1351   BP: 112/70   Pulse: 88   Resp: 20   Temp: 97.7 °F (36.5 °C)   SpO2: 98%

## 2024-12-12 NOTE — PROGRESS NOTES
Subjective:     Tyrone Berger is a 64 y.o. female who presents today with the following:  Chief Complaint   Patient presents with    Swelling     Right ankle  from sprain       Patient Active Problem List   Diagnosis    Allergic rhinitis    Vitamin D deficiency    Hypothyroid    Right ear pain    Herpes zoster of eye    MDD (major depressive disorder), recurrent severe, without psychosis (HCC)    Iron deficiency anemia    Acute on chronic anemia    Iron deficiency anemia secondary to inadequate dietary iron intake    Insomnia    Neck injury    Osteoarthritis of right knee    Pes anserinus bursitis of right knee    Right knee pain     Chronic right knee pain: Ms. Berger reports that she has been experiencing pain to her right knee x 2 years, though she has been prolonging treatment due to not wanting to have a knee replacement. Ms. Berger has been followed by Dr. Jamison with Ortho, undergone physical therapy and worn compression braces. Ms. Berger is receptive to obtaining updated imaging to determine the appropriate course of treatment.     Sprain of right ankle: Ms. Berger endorses that 2 nights ago, she got up to use the bathroom and twisted her ankle. She reports increased edema and pain with weight bearing and internal rotation. She denies having tried any interventions at this time for the swelling or discomfort. She is receptive to obtaining imaging to rule out fractures.     Prurigo nodularis; new diagnosis followed by Dermatologist Dr. Durant.  I reviewed the notes  Antibiotic to treat MSSA cuture patient prefers Cipro. She is in agreement intra lesion kenalog and biologic if needed.  Back line number [phone] to derm practice   ROS:  Gen: denies fever, chills, fatigue, weight loss, weight gain  HEENT:denies blurry vision, nasal congestion, sore throat  Resp: denies dypsnea, cough, wheezing  CV: denies chest pain radiating to the jaws or arms or palpitations  Abd: denies nausea, vomiting, diarrhea,

## 2025-01-02 DIAGNOSIS — F90.2 ATTENTION DEFICIT HYPERACTIVITY DISORDER (ADHD), COMBINED TYPE: ICD-10-CM

## 2025-01-02 RX ORDER — LISDEXAMFETAMINE DIMESYLATE 50 MG/1
50 CAPSULE ORAL DAILY
Qty: 30 CAPSULE | Refills: 0 | Status: SHIPPED | OUTPATIENT
Start: 2025-01-02 | End: 2025-02-01

## 2025-01-07 RX ORDER — LEVOTHYROXINE SODIUM 75 UG/1
75 TABLET ORAL DAILY
Qty: 90 TABLET | Refills: 0 | Status: SHIPPED | OUTPATIENT
Start: 2025-01-07

## 2025-02-04 DIAGNOSIS — F90.2 ATTENTION DEFICIT HYPERACTIVITY DISORDER (ADHD), COMBINED TYPE: ICD-10-CM

## 2025-02-04 NOTE — TELEPHONE ENCOUNTER
lisdexamfetamine (VYVANSE) 50 MG capsule [ANABEL Perdue NP]     Preferred pharmacy: [address] - P [phone] - F [phone]

## 2025-02-05 RX ORDER — LISDEXAMFETAMINE DIMESYLATE 50 MG/1
50 CAPSULE ORAL DAILY
Qty: 30 CAPSULE | Refills: 0 | Status: SHIPPED | OUTPATIENT
Start: 2025-02-05 | End: 2025-03-07

## 2025-02-27 ENCOUNTER — TELEMEDICINE (OUTPATIENT)
Age: 65
End: 2025-02-27

## 2025-02-27 DIAGNOSIS — F33.2 MAJOR DEPRESSIVE DISORDER, RECURRENT SEVERE WITHOUT PSYCHOTIC FEATURES (HCC): Primary | ICD-10-CM

## 2025-02-27 RX ORDER — SILVER SULFADIAZINE 10 MG/G
CREAM TOPICAL
Qty: 400 G | Refills: 1 | Status: SHIPPED | OUTPATIENT
Start: 2025-02-27 | End: 2025-02-27 | Stop reason: CLARIF

## 2025-02-27 ASSESSMENT — SOCIAL DETERMINANTS OF HEALTH (SDOH)
WITHIN THE LAST YEAR, HAVE TO BEEN RAPED OR FORCED TO HAVE ANY KIND OF SEXUAL ACTIVITY BY YOUR PARTNER OR EX-PARTNER?: NO
WITHIN THE LAST YEAR, HAVE YOU BEEN AFRAID OF YOUR PARTNER OR EX-PARTNER?: YES
WITHIN THE LAST YEAR, HAVE YOU BEEN HUMILIATED OR EMOTIONALLY ABUSED IN OTHER WAYS BY YOUR PARTNER OR EX-PARTNER?: YES

## 2025-02-27 ASSESSMENT — PATIENT HEALTH QUESTIONNAIRE - PHQ9
3. TROUBLE FALLING OR STAYING ASLEEP: NOT AT ALL
5. POOR APPETITE OR OVEREATING: NOT AT ALL
SUM OF ALL RESPONSES TO PHQ QUESTIONS 1-9: 0
1. LITTLE INTEREST OR PLEASURE IN DOING THINGS: NOT AT ALL
8. MOVING OR SPEAKING SO SLOWLY THAT OTHER PEOPLE COULD HAVE NOTICED. OR THE OPPOSITE, BEING SO FIGETY OR RESTLESS THAT YOU HAVE BEEN MOVING AROUND A LOT MORE THAN USUAL: NOT AT ALL
2. FEELING DOWN, DEPRESSED OR HOPELESS: NOT AT ALL
10. IF YOU CHECKED OFF ANY PROBLEMS, HOW DIFFICULT HAVE THESE PROBLEMS MADE IT FOR YOU TO DO YOUR WORK, TAKE CARE OF THINGS AT HOME, OR GET ALONG WITH OTHER PEOPLE: NOT DIFFICULT AT ALL
SUM OF ALL RESPONSES TO PHQ9 QUESTIONS 1 & 2: 0
SUM OF ALL RESPONSES TO PHQ QUESTIONS 1-9: 0
SUM OF ALL RESPONSES TO PHQ QUESTIONS 1-9: 0
7. TROUBLE CONCENTRATING ON THINGS, SUCH AS READING THE NEWSPAPER OR WATCHING TELEVISION: NOT AT ALL
SUM OF ALL RESPONSES TO PHQ QUESTIONS 1-9: 0
4. FEELING TIRED OR HAVING LITTLE ENERGY: NOT AT ALL
6. FEELING BAD ABOUT YOURSELF - OR THAT YOU ARE A FAILURE OR HAVE LET YOURSELF OR YOUR FAMILY DOWN: NOT AT ALL
9. THOUGHTS THAT YOU WOULD BE BETTER OFF DEAD, OR OF HURTING YOURSELF: NOT AT ALL

## 2025-02-27 NOTE — PROGRESS NOTES
\"Have you been to the ER, urgent care clinic since your last visit?  Hospitalized since your last visit?\"    NO    “Have you seen or consulted any other health care providers outside of [facility] since your last visit?”    NO    Have you had a mammogram?”       Date of last Mammogram: 9/30/2021             Click Here for Release of Records Request      Chief Complaint   Patient presents with    Foot Swelling     Both feet         There were no vitals filed for this visit.

## 2025-02-28 NOTE — PROGRESS NOTES
Tyrone Berger, was evaluated through a synchronous (real-time) audio-video encounter. The patient (or guardian if applicable) is aware that this is a billable service, which includes applicable co-pays. This Virtual Visit was conducted with patient's (and/or legal guardian's) consent. Patient identification was verified, and a caregiver was present when appropriate.   The patient was located at Other: in her car in a parking in [place]  Provider was located at Facility (Appt Dept): [address] [place] [phone]  Confirm you are appropriately licensed, registered, or certified to deliver care in the state where the patient is located as indicated above. If you are not or unsure, please re-schedule the visit: Yes, I confirm.     Tyrone Berger (:  1960) is a Established patient, presenting virtually for evaluation of the following: having marital concerns she is working with her therapist and has a restraining order with her spouse.      Below is the assessment and plan developed based on review of pertinent history, physical exam, labs, studies, and medications.     Assessment & Plan  Major depressive disorder, recurrent severe without psychotic features (HCC)   Chronic, at goal (stable), continue current treatment plan followed by behavioral health            No follow-ups on file.     Personal concern /marital concern she is working with her therapist   Subjective   HPI  Review of Systems   As noted in the HPI    Objective   Patient-Reported Vitals  No data recorded         Constitutional: [x] Appears well-developed and well-nourished [x] No apparent physical distress      [] Abnormal -     Mental status: [x] Alert and awake  [x] Oriented to person/place/time [x] Able to follow commands    [] Abnormal -     Eyes:   EOM    [x]  Normal    [] Abnormal -   Sclera  [x]  Normal    [] Abnormal -          Discharge [x]  None visible   [] Abnormal -     HENT: [x] Normocephalic,

## 2025-03-06 DIAGNOSIS — F90.2 ATTENTION DEFICIT HYPERACTIVITY DISORDER (ADHD), COMBINED TYPE: ICD-10-CM

## 2025-03-06 RX ORDER — LISDEXAMFETAMINE DIMESYLATE 50 MG/1
50 CAPSULE ORAL DAILY
Qty: 30 CAPSULE | Refills: 0 | Status: SHIPPED | OUTPATIENT
Start: 2025-03-06 | End: 2025-04-05

## 2025-04-04 DIAGNOSIS — F90.2 ATTENTION DEFICIT HYPERACTIVITY DISORDER (ADHD), COMBINED TYPE: ICD-10-CM

## 2025-04-04 RX ORDER — LISDEXAMFETAMINE DIMESYLATE 50 MG/1
50 CAPSULE ORAL DAILY
Qty: 30 CAPSULE | Refills: 0 | Status: SHIPPED | OUTPATIENT
Start: 2025-04-04 | End: 2025-05-04

## 2025-04-14 ENCOUNTER — OFFICE VISIT (OUTPATIENT)
Age: 65
End: 2025-04-14
Payer: MEDICARE

## 2025-04-14 VITALS
HEART RATE: 84 BPM | BODY MASS INDEX: 32.62 KG/M2 | DIASTOLIC BLOOD PRESSURE: 80 MMHG | WEIGHT: 196 LBS | SYSTOLIC BLOOD PRESSURE: 130 MMHG | RESPIRATION RATE: 18 BRPM | TEMPERATURE: 97.2 F

## 2025-04-14 DIAGNOSIS — D50.8 IRON DEFICIENCY ANEMIA SECONDARY TO INADEQUATE DIETARY IRON INTAKE: ICD-10-CM

## 2025-04-14 DIAGNOSIS — M25.561 CHRONIC PAIN OF RIGHT KNEE: ICD-10-CM

## 2025-04-14 DIAGNOSIS — G89.29 CHRONIC PAIN OF RIGHT KNEE: ICD-10-CM

## 2025-04-14 DIAGNOSIS — M17.11 PRIMARY OSTEOARTHRITIS OF RIGHT KNEE: ICD-10-CM

## 2025-04-14 DIAGNOSIS — M25.469 KNEE SWELLING: ICD-10-CM

## 2025-04-14 DIAGNOSIS — D50.8 IRON DEFICIENCY ANEMIA SECONDARY TO INADEQUATE DIETARY IRON INTAKE: Primary | ICD-10-CM

## 2025-04-14 PROCEDURE — 1125F AMNT PAIN NOTED PAIN PRSNT: CPT | Performed by: NURSE PRACTITIONER

## 2025-04-14 PROCEDURE — G8417 CALC BMI ABV UP PARAM F/U: HCPCS | Performed by: NURSE PRACTITIONER

## 2025-04-14 PROCEDURE — G8427 DOCREV CUR MEDS BY ELIG CLIN: HCPCS | Performed by: NURSE PRACTITIONER

## 2025-04-14 PROCEDURE — 1036F TOBACCO NON-USER: CPT | Performed by: NURSE PRACTITIONER

## 2025-04-14 PROCEDURE — 3017F COLORECTAL CA SCREEN DOC REV: CPT | Performed by: NURSE PRACTITIONER

## 2025-04-14 PROCEDURE — 1123F ACP DISCUSS/DSCN MKR DOCD: CPT | Performed by: NURSE PRACTITIONER

## 2025-04-14 PROCEDURE — 1090F PRES/ABSN URINE INCON ASSESS: CPT | Performed by: NURSE PRACTITIONER

## 2025-04-14 PROCEDURE — 1160F RVW MEDS BY RX/DR IN RCRD: CPT | Performed by: NURSE PRACTITIONER

## 2025-04-14 PROCEDURE — 99214 OFFICE O/P EST MOD 30 MIN: CPT | Performed by: NURSE PRACTITIONER

## 2025-04-14 PROCEDURE — 1159F MED LIST DOCD IN RCRD: CPT | Performed by: NURSE PRACTITIONER

## 2025-04-14 PROCEDURE — G8400 PT W/DXA NO RESULTS DOC: HCPCS | Performed by: NURSE PRACTITIONER

## 2025-04-14 RX ORDER — CETIRIZINE HYDROCHLORIDE 10 MG/1
10 TABLET ORAL DAILY
Qty: 90 TABLET | Refills: 1 | Status: SHIPPED | OUTPATIENT
Start: 2025-04-14

## 2025-04-14 RX ORDER — LEVOTHYROXINE SODIUM 75 UG/1
75 TABLET ORAL DAILY
Qty: 90 TABLET | Refills: 3 | Status: SHIPPED | OUTPATIENT
Start: 2025-04-14

## 2025-04-14 RX ORDER — LEVOTHYROXINE SODIUM 75 UG/1
75 TABLET ORAL DAILY
Qty: 90 TABLET | Refills: 3 | OUTPATIENT
Start: 2025-04-14 | End: 2025-04-14 | Stop reason: SDUPTHER

## 2025-04-14 NOTE — PROGRESS NOTES
\"Have you been to the ER, urgent care clinic since your last visit?  Hospitalized since your last visit?\"    NO    “Have you seen or consulted any other health care providers outside our system since your last visit?”    [place] (infusion)    Have you had a mammogram?”   NO    Date of last Mammogram: 9/30/2021     Chief Complaint   Patient presents with    Knee Pain     Right   Referral requests    Infusion     Follow up     Medication Refill     Synthroid       Vitals:    04/14/25 1648   BP: 130/80   Pulse: 84   Resp: 18   Temp: 97.2 °F (36.2 °C)

## 2025-04-15 NOTE — ASSESSMENT & PLAN NOTE
Chronic, worsening (exacerbation), refer to specialist and lifestyle modifications recommended

## 2025-04-15 NOTE — PROGRESS NOTES
Subjective:     Tyrone Berger is a 64 y.o. female who presents today with the following:  Chief Complaint   Patient presents with    Knee Pain     Right   Referral requests    Infusion     Follow up     Medication Refill     Synthroid       History of Present Illness  The patient presents for evaluation of anemia, right knee swelling, allergies, and medication management.    Anemia has been diagnosed and she is followed by a hematologist.. Improvement in her condition is reported following the administration of 2 units of blood and 1000 mg of iron. Energy levels have increased, although strenuous activities such as running a marathon or intense workouts remain unfeasible. She is currently under the care of a hematologist who has recommended a series of tests including CBC with differential, ferritin, CMP, iron, and iron binding capacity. These tests are to be conducted every 4 weeks. If the results are unfavorable, a bone marrow test may be considered. Consultation with the integrative department at [facility] for further evaluation has also been advised.    Persistent swelling in the right knee is reported. Previous consultation with an orthopedic specialist and x-ray imaging have been conducted. Platelet-Rich Plasma (PRP) therapy is being considered as a potential treatment option, and a referral to the [facility] in [place] is sought. Pain management has included ice application, but Advil is avoided due to potential interaction with iron supplements.    A refill of Synthroid prescription is requested.    Self-medication with Zyrtec for allergies has been ongoing, and a prescription for the same is sought.    A history of gout is noted, with previous use of a liquid corticosteroid for treatment. A prescription for this medication is requested.    Patient Active Problem List   Diagnosis    Allergic rhinitis    Vitamin D deficiency    Hypothyroid    Right ear pain    Herpes zoster of eye    MDD

## 2025-04-15 NOTE — ASSESSMENT & PLAN NOTE
Monitored by specialist- no acute findings meriting change in the plan    Orders:    CBC with Auto Differential; Future    Iron and TIBC; Future    Ferritin; Future    Comprehensive Metabolic Panel; Future

## 2025-04-19 LAB
ALBUMIN SERPL-MCNC: 4.8 G/DL (ref 3.9–4.9)
ALP SERPL-CCNC: 119 IU/L (ref 44–121)
ALT SERPL-CCNC: 13 IU/L (ref 0–32)
AST SERPL-CCNC: 15 IU/L (ref 0–40)
BILIRUB SERPL-MCNC: 0.2 MG/DL (ref 0–1.2)
BUN SERPL-MCNC: 20 MG/DL (ref 8–27)
BUN/CREAT SERPL: 30 (ref 12–28)
CALCIUM SERPL-MCNC: 10.7 MG/DL (ref 8.7–10.3)
CHLORIDE SERPL-SCNC: 99 MMOL/L (ref 96–106)
CO2 SERPL-SCNC: 22 MMOL/L (ref 20–29)
CREAT SERPL-MCNC: 0.66 MG/DL (ref 0.57–1)
EGFRCR SERPLBLD CKD-EPI 2021: 97 ML/MIN/1.73
FERRITIN SERPL-MCNC: 74 NG/ML (ref 15–150)
GLOBULIN SER CALC-MCNC: 2.1 G/DL (ref 1.5–4.5)
GLUCOSE SERPL-MCNC: 95 MG/DL (ref 70–99)
IRON SATN MFR SERPL: 14 % (ref 15–55)
IRON SERPL-MCNC: 54 UG/DL (ref 27–139)
POTASSIUM SERPL-SCNC: 4.5 MMOL/L (ref 3.5–5.2)
PROT SERPL-MCNC: 6.9 G/DL (ref 6–8.5)
SODIUM SERPL-SCNC: 138 MMOL/L (ref 134–144)
TIBC SERPL-MCNC: 386 UG/DL (ref 250–450)
UIBC SERPL-MCNC: 332 UG/DL (ref 118–369)

## 2025-04-20 ENCOUNTER — RESULTS FOLLOW-UP (OUTPATIENT)
Age: 65
End: 2025-04-20

## 2025-04-20 LAB
BASOPHILS # BLD AUTO: 0.1 X10E3/UL (ref 0–0.2)
BASOPHILS NFR BLD AUTO: 1 %
EOSINOPHIL # BLD AUTO: 0.2 X10E3/UL (ref 0–0.4)
EOSINOPHIL NFR BLD AUTO: 3 %
ERYTHROCYTE [DISTWIDTH] IN BLOOD BY AUTOMATED COUNT: 22.7 % (ref 11.7–15.4)
HCT VFR BLD AUTO: 43.8 % (ref 34–46.6)
HGB BLD-MCNC: 13.2 G/DL (ref 11.1–15.9)
IMM GRANULOCYTES # BLD AUTO: 0 X10E3/UL (ref 0–0.1)
IMM GRANULOCYTES NFR BLD AUTO: 0 %
LYMPHOCYTES # BLD AUTO: 1.3 X10E3/UL (ref 0.7–3.1)
LYMPHOCYTES NFR BLD AUTO: 27 %
MCH RBC QN AUTO: 28 PG (ref 26.6–33)
MCHC RBC AUTO-ENTMCNC: 30.1 G/DL (ref 31.5–35.7)
MCV RBC AUTO: 93 FL (ref 79–97)
MONOCYTES # BLD AUTO: 0.4 X10E3/UL (ref 0.1–0.9)
MONOCYTES NFR BLD AUTO: 9 %
MORPHOLOGY BLD-IMP: ABNORMAL
NEUTROPHILS # BLD AUTO: 3 X10E3/UL (ref 1.4–7)
NEUTROPHILS NFR BLD AUTO: 60 %
PLATELET # BLD AUTO: 354 X10E3/UL (ref 150–450)
RBC # BLD AUTO: 4.72 X10E6/UL (ref 3.77–5.28)
WBC # BLD AUTO: 5 X10E3/UL (ref 3.4–10.8)

## 2025-04-23 DIAGNOSIS — S30.861A TICK BITE OF ABDOMINAL WALL, INITIAL ENCOUNTER: Primary | ICD-10-CM
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
## 2025-04-23 RX ORDER — DOXYCYCLINE HYCLATE 100 MG
100 TABLET ORAL 2 TIMES DAILY
Qty: 28 TABLET | Refills: 0 | Status: SHIPPED | OUTPATIENT
Start: 2025-04-23 | End: 2025-05-07

## 2025-05-08 DIAGNOSIS — F90.2 ATTENTION DEFICIT HYPERACTIVITY DISORDER (ADHD), COMBINED TYPE: ICD-10-CM
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
## 2025-05-08 RX ORDER — LISDEXAMFETAMINE DIMESYLATE 50 MG/1
50 CAPSULE ORAL DAILY
Qty: 30 CAPSULE | Refills: 0 | Status: SHIPPED | OUTPATIENT
Start: 2025-05-08 | End: 2025-06-07

## 2025-06-04 DIAGNOSIS — F90.2 ATTENTION DEFICIT HYPERACTIVITY DISORDER (ADHD), COMBINED TYPE: ICD-10-CM

## 2025-06-04 LAB
COMMENT: NORMAL
RICK SF IGG TITR SER: NORMAL {TITER}
RICK SF IGM TITR SER: NORMAL {TITER}
SPECIMEN STATUS REPORT: NORMAL

## 2025-06-04 NOTE — TELEPHONE ENCOUNTER
Refills have been requested for the following medications:         lisdexamfetamine (VYVANSE) 50 MG capsule [ANABEL Perdue - NP]     Preferred pharmacy: [address] - P [phone] - F [phone]

## 2025-06-06 ENCOUNTER — CLINICAL SUPPORT (OUTPATIENT)
Age: 65
End: 2025-06-06

## 2025-06-06 DIAGNOSIS — F90.2 ATTENTION DEFICIT HYPERACTIVITY DISORDER (ADHD), COMBINED TYPE: Primary | ICD-10-CM

## 2025-06-06 DIAGNOSIS — Z79.899 HIGH RISK MEDICATION USE: ICD-10-CM

## 2025-06-09 RX ORDER — LISDEXAMFETAMINE DIMESYLATE 50 MG/1
50 CAPSULE ORAL DAILY
Qty: 30 CAPSULE | Refills: 0 | Status: SHIPPED | OUTPATIENT
Start: 2025-06-09 | End: 2025-07-09

## 2025-06-12 LAB — DRUGS UR: NORMAL

## 2025-06-19 DIAGNOSIS — D50.8 OTHER IRON DEFICIENCY ANEMIAS: Primary | ICD-10-CM

## 2025-06-28 LAB
ALBUMIN SERPL-MCNC: 4.4 G/DL (ref 3.9–4.9)
ALP SERPL-CCNC: 117 IU/L (ref 44–121)
ALT SERPL-CCNC: 15 IU/L (ref 0–32)
AST SERPL-CCNC: 19 IU/L (ref 0–40)
BASOPHILS # BLD AUTO: 0.1 X10E3/UL (ref 0–0.2)
BASOPHILS NFR BLD AUTO: 1 %
BILIRUB SERPL-MCNC: <0.2 MG/DL (ref 0–1.2)
BUN SERPL-MCNC: 31 MG/DL (ref 8–27)
BUN/CREAT SERPL: 41 (ref 12–28)
CALCIUM SERPL-MCNC: 10.2 MG/DL (ref 8.7–10.3)
CHLORIDE SERPL-SCNC: 102 MMOL/L (ref 96–106)
CO2 SERPL-SCNC: 23 MMOL/L (ref 20–29)
CREAT SERPL-MCNC: 0.76 MG/DL (ref 0.57–1)
EGFRCR SERPLBLD CKD-EPI 2021: 86 ML/MIN/1.73
EOSINOPHIL # BLD AUTO: 0.1 X10E3/UL (ref 0–0.4)
EOSINOPHIL NFR BLD AUTO: 2 %
ERYTHROCYTE [DISTWIDTH] IN BLOOD BY AUTOMATED COUNT: 14.9 % (ref 11.7–15.4)
FERRITIN SERPL-MCNC: 8 NG/ML (ref 15–150)
GLOBULIN SER CALC-MCNC: 2.1 G/DL (ref 1.5–4.5)
GLUCOSE SERPL-MCNC: 98 MG/DL (ref 70–99)
HCT VFR BLD AUTO: 35.8 % (ref 34–46.6)
HGB BLD-MCNC: 10.5 G/DL (ref 11.1–15.9)
IMM GRANULOCYTES # BLD AUTO: 0 X10E3/UL (ref 0–0.1)
IMM GRANULOCYTES NFR BLD AUTO: 0 %
IRON SATN MFR SERPL: 3 % (ref 15–55)
IRON SERPL-MCNC: 16 UG/DL (ref 27–139)
LYMPHOCYTES # BLD AUTO: 1.4 X10E3/UL (ref 0.7–3.1)
LYMPHOCYTES NFR BLD AUTO: 27 %
MCH RBC QN AUTO: 26.4 PG (ref 26.6–33)
MCHC RBC AUTO-ENTMCNC: 29.3 G/DL (ref 31.5–35.7)
MCV RBC AUTO: 90 FL (ref 79–97)
MONOCYTES # BLD AUTO: 0.6 X10E3/UL (ref 0.1–0.9)
MONOCYTES NFR BLD AUTO: 11 %
NEUTROPHILS # BLD AUTO: 3.1 X10E3/UL (ref 1.4–7)
NEUTROPHILS NFR BLD AUTO: 59 %
PLATELET # BLD AUTO: 334 X10E3/UL (ref 150–450)
POTASSIUM SERPL-SCNC: 4.9 MMOL/L (ref 3.5–5.2)
PROT SERPL-MCNC: 6.5 G/DL (ref 6–8.5)
RBC # BLD AUTO: 3.98 X10E6/UL (ref 3.77–5.28)
SODIUM SERPL-SCNC: 140 MMOL/L (ref 134–144)
TIBC SERPL-MCNC: 465 UG/DL (ref 250–450)
UIBC SERPL-MCNC: 449 UG/DL (ref 118–369)
WBC # BLD AUTO: 5.2 X10E3/UL (ref 3.4–10.8)

## 2025-06-30 ENCOUNTER — RESULTS FOLLOW-UP (OUTPATIENT)
Age: 65
End: 2025-06-30

## 2025-07-03 DIAGNOSIS — F90.2 ATTENTION DEFICIT HYPERACTIVITY DISORDER (ADHD), COMBINED TYPE: ICD-10-CM

## 2025-07-03 RX ORDER — LISDEXAMFETAMINE DIMESYLATE 50 MG/1
50 CAPSULE ORAL DAILY
Qty: 30 CAPSULE | Refills: 0 | Status: SHIPPED | OUTPATIENT
Start: 2025-07-03 | End: 2025-08-02

## 2025-07-16 RX ORDER — LEVOTHYROXINE SODIUM 75 MCG
75 TABLET ORAL DAILY
Qty: 90 TABLET | Refills: 0 | Status: SHIPPED | OUTPATIENT
Start: 2025-07-16

## 2025-08-08 DIAGNOSIS — F90.2 ATTENTION DEFICIT HYPERACTIVITY DISORDER (ADHD), COMBINED TYPE: ICD-10-CM

## 2025-08-11 RX ORDER — LISDEXAMFETAMINE DIMESYLATE 50 MG/1
50 CAPSULE ORAL DAILY
Qty: 30 CAPSULE | Refills: 0 | OUTPATIENT
Start: 2025-08-11 | End: 2025-09-10

## 2025-08-11 RX ORDER — VENLAFAXINE HYDROCHLORIDE 75 MG/1
75 CAPSULE, EXTENDED RELEASE ORAL DAILY
Qty: 90 CAPSULE | Refills: 3 | OUTPATIENT
Start: 2025-08-11

## 2025-08-12 DIAGNOSIS — F90.2 ATTENTION DEFICIT HYPERACTIVITY DISORDER (ADHD), COMBINED TYPE: ICD-10-CM

## 2025-08-12 RX ORDER — LISDEXAMFETAMINE DIMESYLATE 50 MG/1
50 CAPSULE ORAL DAILY
Qty: 30 CAPSULE | Refills: 0 | Status: SHIPPED | OUTPATIENT
Start: 2025-08-12 | End: 2025-09-11

## (undated) DEVICE — SOLUTION IRRIG 1000ML STRL H2O USP PLAS POUR BTL

## (undated) DEVICE — SYR 20ML LL STRL LF --

## (undated) DEVICE — ENDO CARRY-ON PROCEDURE KIT INCLUDES ENZYMATIC SPONGE, GAUZE, BIOHAZARD LABEL, TRAY, LUBRICANT, DIRTY SCOPE LABEL, WATER LABEL, TRAY, DRAWSTRING PAD, AND DEFENDO 4-PIECE KIT.: Brand: ENDO CARRY-ON PROCEDURE KIT

## (undated) DEVICE — BLUNT CANNULA: Brand: MONOJECT